# Patient Record
Sex: FEMALE | Race: WHITE | NOT HISPANIC OR LATINO | Employment: OTHER | URBAN - METROPOLITAN AREA
[De-identification: names, ages, dates, MRNs, and addresses within clinical notes are randomized per-mention and may not be internally consistent; named-entity substitution may affect disease eponyms.]

---

## 2017-02-22 ENCOUNTER — OFFICE VISIT (OUTPATIENT)
Dept: CARDIOLOGY | Facility: CLINIC | Age: 72
End: 2017-02-22

## 2017-02-22 VITALS
RESPIRATION RATE: 22 BRPM | HEIGHT: 66 IN | WEIGHT: 203 LBS | DIASTOLIC BLOOD PRESSURE: 92 MMHG | HEART RATE: 82 BPM | BODY MASS INDEX: 32.62 KG/M2 | SYSTOLIC BLOOD PRESSURE: 140 MMHG

## 2017-02-22 DIAGNOSIS — R55 SYNCOPE, UNSPECIFIED SYNCOPE TYPE: Primary | ICD-10-CM

## 2017-02-22 DIAGNOSIS — I48.0 PAF (PAROXYSMAL ATRIAL FIBRILLATION) (HCC): ICD-10-CM

## 2017-02-22 PROCEDURE — 99214 OFFICE O/P EST MOD 30 MIN: CPT | Performed by: INTERNAL MEDICINE

## 2017-02-22 PROCEDURE — 93000 ELECTROCARDIOGRAM COMPLETE: CPT | Performed by: INTERNAL MEDICINE

## 2017-02-24 ENCOUNTER — TELEPHONE (OUTPATIENT)
Dept: CARDIOLOGY | Facility: CLINIC | Age: 72
End: 2017-02-24

## 2017-02-24 NOTE — PROGRESS NOTES
Subjective:     Encounter Date:02/22/2017      Patient ID: Hui Workman is a 71 y.o. female.    Chief Complaint:  Atrial Fibrillation   Presents for follow-up visit. Symptoms include bradycardia, dizziness and shortness of breath. Symptoms are negative for chest pain, hypertension and palpitations. The symptoms have been worsening. Past medical history includes atrial fibrillation.     71-year-old female presents today for reevaluation.  Patient has a history of atrial fibrillation.  Recently however she's had syncopal episodes.  She has been getting dizzy spells also which was consistent with her increased heart rate.  She had episode however where she passed out.  She presents today for further evaluation.  Clinically denies chest discomforts lower extremity edema does report increasing fatigue.  Patient's memory is relatively poor but intermittent in nature.    Review of Systems   Cardiovascular: Negative for chest pain and palpitations.   Respiratory: Positive for shortness of breath.    Neurological: Positive for dizziness.   All other systems reviewed and are negative.        ECG 12 Lead  Date/Time: 2/22/2017 10:46 AM  Performed by: FEDE SIMON  Authorized by: FEDE SIMON   Comparison: compared with previous ECG from 12/6/2016  Similar to previous ECG  Rhythm: sinus rhythm  Clinical impression: normal ECG               Objective:     Physical Exam   Constitutional: She is oriented to person, place, and time. She appears well-developed.   HENT:   Head: Normocephalic.   Eyes: Conjunctivae are normal.   Neck: Normal range of motion.   Cardiovascular: Normal rate, regular rhythm and normal heart sounds.    Pulmonary/Chest: Breath sounds normal.   Abdominal: Soft. Bowel sounds are normal.   Musculoskeletal: Normal range of motion. She exhibits no edema.   Neurological: She is alert and oriented to person, place, and time.   Skin: Skin is warm and dry.   Psychiatric: She has a normal mood and  affect. Her behavior is normal.   Vitals reviewed.      Lab Review:       Assessment:          Diagnosis Plan   1. Syncope, unspecified syncope type  Holter Monitor - 24 Hour          Plan:       1.  Paroxysmal atrial fibrillation.  2.  Patient presents with syncopal episode.  In light of that I will place a Holter monitor on her to see what her heart rates are doing.  There was a delay in dictation and the Holter monitor socially came back.  She had multiple pauses up to 5.1 seconds.  Although no diary was submitted patient and patient's  reports she did have several episodes.  Along with the pauses she also had evidence of tachybradycardia syndrome with her heart rate going up into the 130 range.  She had atrial fibrillation and then subsequently having a AIVR with her heart rate dropping into the 30 range.  In light of that she is on a beta blocker but this is necessary because of a fast heart rate.  This is a clearcut indication for a DDD pacemaker and will set patient up for one.  Again this is symptomatic bradycardia as well as documented tachybradycardia syndrome on a minimal dose of a beta blocker.    Atrial Fibrillation and Atrial Flutter  Assessment  • The patient has paroxysmal atrial fibrillation  • This is non-valvular in etiology  • The patient's CHADS2-VASc score is 2  • A [id number] score of 2 or more is considered a high risk for a thromboembolic event  • Apixaban prescribed    Plan  • Attempt to maintain sinus rhythm  • Continue apixaban for antithrombotic therapy, bleeding issues discussed  • Continue beta blocker for rhythm control

## 2017-03-08 ENCOUNTER — TELEPHONE (OUTPATIENT)
Dept: CARDIOLOGY | Facility: CLINIC | Age: 72
End: 2017-03-08

## 2017-03-08 ENCOUNTER — TRANSCRIBE ORDERS (OUTPATIENT)
Dept: CARDIOLOGY | Facility: CLINIC | Age: 72
End: 2017-03-08

## 2017-03-08 ENCOUNTER — LAB (OUTPATIENT)
Dept: LAB | Facility: HOSPITAL | Age: 72
End: 2017-03-08
Attending: INTERNAL MEDICINE

## 2017-03-08 DIAGNOSIS — Z01.810 PRE-OPERATIVE CARDIOVASCULAR EXAMINATION: Primary | ICD-10-CM

## 2017-03-08 DIAGNOSIS — Z01.810 PRE-OPERATIVE CARDIOVASCULAR EXAMINATION: ICD-10-CM

## 2017-03-08 LAB
ANION GAP SERPL CALCULATED.3IONS-SCNC: 15.8 MMOL/L
BASOPHILS # BLD AUTO: 0.05 10*3/MM3 (ref 0–0.2)
BASOPHILS NFR BLD AUTO: 0.8 % (ref 0–1.5)
BUN BLD-MCNC: 22 MG/DL (ref 8–23)
BUN/CREAT SERPL: 17.9 (ref 7–25)
CALCIUM SPEC-SCNC: 10.2 MG/DL (ref 8.6–10.5)
CHLORIDE SERPL-SCNC: 96 MMOL/L (ref 98–107)
CO2 SERPL-SCNC: 24.2 MMOL/L (ref 22–29)
CREAT BLD-MCNC: 1.23 MG/DL (ref 0.57–1)
DEPRECATED RDW RBC AUTO: 48.5 FL (ref 37–54)
EOSINOPHIL # BLD AUTO: 0.08 10*3/MM3 (ref 0–0.7)
EOSINOPHIL NFR BLD AUTO: 1.2 % (ref 0.3–6.2)
ERYTHROCYTE [DISTWIDTH] IN BLOOD BY AUTOMATED COUNT: 14.1 % (ref 11.7–13)
GFR SERPL CREATININE-BSD FRML MDRD: 43 ML/MIN/1.73
GLUCOSE BLD-MCNC: 116 MG/DL (ref 65–99)
HCT VFR BLD AUTO: 42.8 % (ref 35.6–45.5)
HGB BLD-MCNC: 13.9 G/DL (ref 11.9–15.5)
IMM GRANULOCYTES # BLD: 0.06 10*3/MM3 (ref 0–0.03)
IMM GRANULOCYTES NFR BLD: 0.9 % (ref 0–0.5)
LYMPHOCYTES # BLD AUTO: 1.37 10*3/MM3 (ref 0.9–4.8)
LYMPHOCYTES NFR BLD AUTO: 20.7 % (ref 19.6–45.3)
MCH RBC QN AUTO: 30.6 PG (ref 26.9–32)
MCHC RBC AUTO-ENTMCNC: 32.5 G/DL (ref 32.4–36.3)
MCV RBC AUTO: 94.3 FL (ref 80.5–98.2)
MONOCYTES # BLD AUTO: 0.39 10*3/MM3 (ref 0.2–1.2)
MONOCYTES NFR BLD AUTO: 5.9 % (ref 5–12)
NEUTROPHILS # BLD AUTO: 4.67 10*3/MM3 (ref 1.9–8.1)
NEUTROPHILS NFR BLD AUTO: 70.5 % (ref 42.7–76)
PLATELET # BLD AUTO: 256 10*3/MM3 (ref 140–500)
PMV BLD AUTO: 9.8 FL (ref 6–12)
POTASSIUM BLD-SCNC: 4.5 MMOL/L (ref 3.5–5.2)
RBC # BLD AUTO: 4.54 10*6/MM3 (ref 3.9–5.2)
SODIUM BLD-SCNC: 136 MMOL/L (ref 136–145)
WBC NRBC COR # BLD: 6.62 10*3/MM3 (ref 4.5–10.7)

## 2017-03-08 PROCEDURE — 36415 COLL VENOUS BLD VENIPUNCTURE: CPT

## 2017-03-08 PROCEDURE — 85025 COMPLETE CBC W/AUTO DIFF WBC: CPT

## 2017-03-08 PROCEDURE — 80048 BASIC METABOLIC PNL TOTAL CA: CPT

## 2017-03-08 NOTE — TELEPHONE ENCOUNTER
Pt left msg saying she is having a pacemaker put in on 03-13-17 and has an appointment on 03-10-17.  She wants to know if it's really necessary to keep the appointment on 03-10-17?   Please advise.    Thanks,  Kellen

## 2017-03-10 NOTE — PERIOPERATIVE NURSING NOTE
CALLED AND SPOKE WITH PT, REMINDED NPO AFTER MN Sunday [place] AND MAY TAKE AM MEDS WITH A SIP OF WATER. LAST DOSE OF ELIQUIS TODAY 3/10.  TO ACCOMPANY PT TO HOSPITAL ON MONDAY

## 2017-03-13 ENCOUNTER — HOSPITAL ENCOUNTER (OUTPATIENT)
Facility: HOSPITAL | Age: 72
Setting detail: HOSPITAL OUTPATIENT SURGERY
Discharge: HOME | End: 2017-03-13
Attending: INTERNAL MEDICINE | Admitting: INTERNAL MEDICINE

## 2017-03-13 VITALS
DIASTOLIC BLOOD PRESSURE: 76 MMHG | RESPIRATION RATE: 16 BRPM | BODY MASS INDEX: 32.14 KG/M2 | WEIGHT: 200 LBS | OXYGEN SATURATION: 100 % | SYSTOLIC BLOOD PRESSURE: 136 MMHG | TEMPERATURE: 98.2 F | HEIGHT: 66 IN | HEART RATE: 73 BPM

## 2017-03-13 PROCEDURE — C1894 INTRO/SHEATH, NON-LASER: HCPCS | Performed by: INTERNAL MEDICINE

## 2017-03-13 PROCEDURE — 33208 INSRT HEART PM ATRIAL & VENT: CPT | Performed by: INTERNAL MEDICINE

## 2017-03-13 PROCEDURE — C1769 GUIDE WIRE: HCPCS | Performed by: INTERNAL MEDICINE

## 2017-03-13 PROCEDURE — C1898 LEAD, PMKR, OTHER THAN TRANS: HCPCS | Performed by: INTERNAL MEDICINE

## 2017-03-13 PROCEDURE — C1785 PMKR, DUAL, RATE-RESP: HCPCS | Performed by: INTERNAL MEDICINE

## 2017-03-13 PROCEDURE — 25010000003 CEFAZOLIN PER 500 MG: Performed by: INTERNAL MEDICINE

## 2017-03-13 PROCEDURE — 99153 MOD SED SAME PHYS/QHP EA: CPT | Performed by: INTERNAL MEDICINE

## 2017-03-13 PROCEDURE — 25010000002 MIDAZOLAM PER 1 MG: Performed by: INTERNAL MEDICINE

## 2017-03-13 PROCEDURE — 25010000002 FENTANYL CITRATE (PF) 100 MCG/2ML SOLUTION: Performed by: INTERNAL MEDICINE

## 2017-03-13 PROCEDURE — 99152 MOD SED SAME PHYS/QHP 5/>YRS: CPT | Performed by: INTERNAL MEDICINE

## 2017-03-13 DEVICE — IMPLANTABLE DEVICE: Type: IMPLANTABLE DEVICE | Status: FUNCTIONAL

## 2017-03-13 DEVICE — GEN PM ADVISA SURESCAN DR MRI: Type: IMPLANTABLE DEVICE | Status: FUNCTIONAL

## 2017-03-13 DEVICE — LD PM CAPSURE SENSE VENT 58CM 407458: Type: IMPLANTABLE DEVICE | Status: FUNCTIONAL

## 2017-03-13 RX ORDER — OXYCODONE HYDROCHLORIDE AND ACETAMINOPHEN 5; 325 MG/1; MG/1
1-2 TABLET ORAL EVERY 4 HOURS PRN
Qty: 15 TABLET | Refills: 0 | Status: SHIPPED | OUTPATIENT
Start: 2017-03-13 | End: 2017-04-14 | Stop reason: ALTCHOICE

## 2017-03-13 RX ORDER — FENTANYL CITRATE 50 UG/ML
INJECTION, SOLUTION INTRAMUSCULAR; INTRAVENOUS AS NEEDED
Status: DISCONTINUED | OUTPATIENT
Start: 2017-03-13 | End: 2017-03-13 | Stop reason: HOSPADM

## 2017-03-13 RX ORDER — SODIUM CHLORIDE 0.9 % (FLUSH) 0.9 %
1-10 SYRINGE (ML) INJECTION AS NEEDED
Status: DISCONTINUED | OUTPATIENT
Start: 2017-03-13 | End: 2017-03-13 | Stop reason: HOSPADM

## 2017-03-13 RX ORDER — LIDOCAINE HYDROCHLORIDE 10 MG/ML
0.1 INJECTION, SOLUTION EPIDURAL; INFILTRATION; INTRACAUDAL; PERINEURAL ONCE AS NEEDED
Status: DISCONTINUED | OUTPATIENT
Start: 2017-03-13 | End: 2017-03-13 | Stop reason: HOSPADM

## 2017-03-13 RX ORDER — MIDAZOLAM HYDROCHLORIDE 1 MG/ML
INJECTION INTRAMUSCULAR; INTRAVENOUS AS NEEDED
Status: DISCONTINUED | OUTPATIENT
Start: 2017-03-13 | End: 2017-03-13 | Stop reason: HOSPADM

## 2017-03-13 RX ORDER — LIDOCAINE HYDROCHLORIDE 10 MG/ML
INJECTION, SOLUTION INFILTRATION; PERINEURAL AS NEEDED
Status: DISCONTINUED | OUTPATIENT
Start: 2017-03-13 | End: 2017-03-13 | Stop reason: HOSPADM

## 2017-03-13 RX ORDER — SODIUM CHLORIDE 9 MG/ML
75 INJECTION, SOLUTION INTRAVENOUS CONTINUOUS
Status: DISCONTINUED | OUTPATIENT
Start: 2017-03-13 | End: 2017-03-13 | Stop reason: HOSPADM

## 2017-03-13 RX ADMIN — CEFAZOLIN SODIUM 1 G: 1 INJECTION, SOLUTION INTRAVENOUS at 08:00

## 2017-03-13 RX ADMIN — SODIUM CHLORIDE 75 ML/HR: 9 INJECTION, SOLUTION INTRAVENOUS at 07:25

## 2017-03-13 NOTE — PLAN OF CARE
Problem: Patient Care Overview (Adult)  Goal: Adult Individualization and Mutuality  Outcome: Outcome(s) achieved Date Met:  03/13/17

## 2017-03-13 NOTE — PLAN OF CARE
Problem: Patient Care Overview (Adult)  Goal: Plan of Care Review  Outcome: Outcome(s) achieved Date Met:  03/13/17 03/13/17 1000   Coping/Psychosocial Response Interventions   Plan Of Care Reviewed With patient;isabel   Patient Care Overview   Progress progress toward functional goals as expected   Outcome Evaluation   Outcome Summary/Follow up Plan ready for discharge

## 2017-03-13 NOTE — H&P (VIEW-ONLY)
Subjective:     Encounter Date:02/22/2017      Patient ID: Hui Workman is a 71 y.o. female.    Chief Complaint:  Atrial Fibrillation   Presents for follow-up visit. Symptoms include bradycardia, dizziness and shortness of breath. Symptoms are negative for chest pain, hypertension and palpitations. The symptoms have been worsening. Past medical history includes atrial fibrillation.     71-year-old female presents today for reevaluation.  Patient has a history of atrial fibrillation.  Recently however she's had syncopal episodes.  She has been getting dizzy spells also which was consistent with her increased heart rate.  She had episode however where she passed out.  She presents today for further evaluation.  Clinically denies chest discomforts lower extremity edema does report increasing fatigue.  Patient's memory is relatively poor but intermittent in nature.    Review of Systems   Cardiovascular: Negative for chest pain and palpitations.   Respiratory: Positive for shortness of breath.    Neurological: Positive for dizziness.   All other systems reviewed and are negative.        ECG 12 Lead  Date/Time: 2/22/2017 10:46 AM  Performed by: FEDE SIMON  Authorized by: FEDE SIMON   Comparison: compared with previous ECG from 12/6/2016  Similar to previous ECG  Rhythm: sinus rhythm  Clinical impression: normal ECG               Objective:     Physical Exam   Constitutional: She is oriented to person, place, and time. She appears well-developed.   HENT:   Head: Normocephalic.   Eyes: Conjunctivae are normal.   Neck: Normal range of motion.   Cardiovascular: Normal rate, regular rhythm and normal heart sounds.    Pulmonary/Chest: Breath sounds normal.   Abdominal: Soft. Bowel sounds are normal.   Musculoskeletal: Normal range of motion. She exhibits no edema.   Neurological: She is alert and oriented to person, place, and time.   Skin: Skin is warm and dry.   Psychiatric: She has a normal mood and  affect. Her behavior is normal.   Vitals reviewed.      Lab Review:       Assessment:          Diagnosis Plan   1. Syncope, unspecified syncope type  Holter Monitor - 24 Hour          Plan:       1.  Paroxysmal atrial fibrillation.  2.  Patient presents with syncopal episode.  In light of that I will place a Holter monitor on her to see what her heart rates are doing.  There was a delay in dictation and the Holter monitor socially came back.  She had multiple pauses up to 5.1 seconds.  Although no diary was submitted patient and patient's  reports she did have several episodes.  Along with the pauses she also had evidence of tachybradycardia syndrome with her heart rate going up into the 130 range.  She had atrial fibrillation and then subsequently having a AIVR with her heart rate dropping into the 30 range.  In light of that she is on a beta blocker but this is necessary because of a fast heart rate.  This is a clearcut indication for a DDD pacemaker and will set patient up for one.  Again this is symptomatic bradycardia as well as documented tachybradycardia syndrome on a minimal dose of a beta blocker.    Atrial Fibrillation and Atrial Flutter  Assessment  • The patient has paroxysmal atrial fibrillation  • This is non-valvular in etiology  • The patient's CHADS2-VASc score is 2  • A [id number] score of 2 or more is considered a high risk for a thromboembolic event  • Apixaban prescribed    Plan  • Attempt to maintain sinus rhythm  • Continue apixaban for antithrombotic therapy, bleeding issues discussed  • Continue beta blocker for rhythm control

## 2017-03-13 NOTE — PLAN OF CARE
Problem: Patient Care Overview (Adult)  Goal: Discharge Needs Assessment  Outcome: Outcome(s) achieved Date Met:  03/13/17

## 2017-03-13 NOTE — PLAN OF CARE
Problem: Cardiac Rhythm Management Device (Adult)  Goal: Signs and Symptoms of Listed Potential Problems Will be Absent or Manageable (Cardiac Rhythm Management Device)  Outcome: Outcome(s) achieved Date Met:  03/13/17 03/13/17 1000   Cardiac Rhythm Management Device   Problems Assessed (Cardiac Rhythm Management Device) all   Problems Present (Cardiac Rhythm Management Device) none

## 2017-03-20 ENCOUNTER — CLINICAL SUPPORT NO REQUIREMENTS (OUTPATIENT)
Dept: CARDIOLOGY | Facility: CLINIC | Age: 72
End: 2017-03-20

## 2017-03-23 ENCOUNTER — CLINICAL SUPPORT NO REQUIREMENTS (OUTPATIENT)
Dept: CARDIOLOGY | Facility: CLINIC | Age: 72
End: 2017-03-23

## 2017-03-23 DIAGNOSIS — I49.5 SICK SINUS SYNDROME (HCC): Primary | ICD-10-CM

## 2017-04-10 RX ORDER — APIXABAN 5 MG/1
TABLET, FILM COATED ORAL
Qty: 180 TABLET | Refills: 1 | Status: SHIPPED | OUTPATIENT
Start: 2017-04-10 | End: 2017-05-26 | Stop reason: SDUPTHER

## 2017-04-14 ENCOUNTER — OFFICE VISIT (OUTPATIENT)
Dept: CARDIOLOGY | Facility: CLINIC | Age: 72
End: 2017-04-14

## 2017-04-14 VITALS
SYSTOLIC BLOOD PRESSURE: 140 MMHG | HEART RATE: 60 BPM | HEIGHT: 66 IN | BODY MASS INDEX: 32.78 KG/M2 | DIASTOLIC BLOOD PRESSURE: 80 MMHG | WEIGHT: 204 LBS

## 2017-04-14 DIAGNOSIS — I49.5 SSS (SICK SINUS SYNDROME) (HCC): ICD-10-CM

## 2017-04-14 DIAGNOSIS — I48.0 PAROXYSMAL ATRIAL FIBRILLATION (HCC): Primary | ICD-10-CM

## 2017-04-14 PROCEDURE — 99214 OFFICE O/P EST MOD 30 MIN: CPT | Performed by: INTERNAL MEDICINE

## 2017-04-14 NOTE — PROGRESS NOTES
Subjective:     Encounter Date:04/14/2017      Patient ID: Hui Workman is a 71 y.o. female.    Chief Complaint:  Atrial Fibrillation   Presents for follow-up visit. Symptoms are negative for an AICD problem, bradycardia, chest pain, dizziness, hypertension, pacemaker problem, shortness of breath and syncope. The symptoms have been stable. Past medical history includes atrial fibrillation.   Hypertension   This is a new problem. The current episode started more than 1 year ago. The problem is controlled. Pertinent negatives include no chest pain or shortness of breath.     Patient presents today for reevaluation.  Patient had a pacemaker placed.  Patient continues to do okay.  No chest pain no syncope no shortness of breath.  Occasional fatigue.    Review of Systems   Cardiovascular: Negative for chest pain and syncope.   Respiratory: Positive for wheezing. Negative for shortness of breath.    Neurological: Negative for dizziness.   All other systems reviewed and are negative.      Procedures       Objective:     Physical Exam   Constitutional: She is oriented to person, place, and time. She appears well-developed.   HENT:   Head: Normocephalic.   Eyes: Conjunctivae are normal.   Neck: Normal range of motion.   Cardiovascular: Normal rate, regular rhythm and normal heart sounds.    Pulmonary/Chest: Breath sounds normal.   Abdominal: Soft. Bowel sounds are normal.   Musculoskeletal: Normal range of motion. She exhibits no edema.   Neurological: She is alert and oriented to person, place, and time.   Skin: Skin is warm and dry.   Psychiatric: She has a normal mood and affect. Her behavior is normal.   Vitals reviewed.      Lab Review:       Assessment:         No diagnosis found.       Plan:       1.  Paroxysmal atrial fibrillation.  Pacemaker interrogation shows that she is in atrial fibrillation approximately 30% of the time.  She has a rapid rate.  Discussed options.  Can increase her beta blocker to 25 mg  twice a day.  Other options include amiodarone which I did discuss however from a fall off for the time being.  Patient is concerned because she's had lung cancer before with treatment and always a concern of scarring.  2.  Syncope none further  3.  Hypertension patient's blood pressure is elevated again increasing her beta blocker will help.    4. Follow-up in 6 weeks we'll reassess at that point.    Atrial Fibrillation and Atrial Flutter  Assessment  • The patient has paroxysmal atrial fibrillation  • This is non-valvular in etiology  • The patient's CHADS2-VASc score is 2  • A [id number] score of 2 or more is considered a high risk for a thromboembolic event  • Apixaban prescribed    Plan  • Attempt to maintain sinus rhythm  • Continue apixaban for antithrombotic therapy, bleeding issues discussed  • Continue beta blocker for rhythm control

## 2017-05-25 ENCOUNTER — TELEPHONE (OUTPATIENT)
Dept: OTHER | Facility: HOSPITAL | Age: 72
End: 2017-05-25

## 2017-05-25 ENCOUNTER — HOSPITAL ENCOUNTER (OUTPATIENT)
Dept: PET IMAGING | Facility: HOSPITAL | Age: 72
Discharge: HOME | End: 2017-05-25
Attending: INTERNAL MEDICINE | Admitting: INTERNAL MEDICINE

## 2017-05-25 DIAGNOSIS — Z87.891 FORMER HEAVY CIGARETTE SMOKER (20-39 PER DAY): ICD-10-CM

## 2017-05-25 DIAGNOSIS — Z12.2 SCREENING FOR MALIGNANT NEOPLASM OF RESPIRATORY ORGAN: Primary | ICD-10-CM

## 2017-05-25 DIAGNOSIS — T45.1X5A CHEMOTHERAPY-INDUCED NEUROPATHY (HCC): ICD-10-CM

## 2017-05-25 DIAGNOSIS — M54.2 NECK PAIN ON RIGHT SIDE: ICD-10-CM

## 2017-05-25 DIAGNOSIS — Z85.3 HISTORY OF LEFT BREAST CANCER: ICD-10-CM

## 2017-05-25 DIAGNOSIS — E04.1 THYROID NODULE: ICD-10-CM

## 2017-05-25 DIAGNOSIS — G62.0 CHEMOTHERAPY-INDUCED NEUROPATHY (HCC): ICD-10-CM

## 2017-05-25 DIAGNOSIS — C34.90 SMALL CELL LUNG CANCER, UNSPECIFIED LATERALITY (HCC): ICD-10-CM

## 2017-05-25 PROCEDURE — G0297 LDCT FOR LUNG CA SCREEN: HCPCS

## 2017-05-26 ENCOUNTER — OFFICE VISIT (OUTPATIENT)
Dept: CARDIOLOGY | Facility: CLINIC | Age: 72
End: 2017-05-26

## 2017-05-26 ENCOUNTER — CLINICAL SUPPORT NO REQUIREMENTS (OUTPATIENT)
Dept: CARDIOLOGY | Facility: CLINIC | Age: 72
End: 2017-05-26

## 2017-05-26 VITALS
SYSTOLIC BLOOD PRESSURE: 100 MMHG | DIASTOLIC BLOOD PRESSURE: 72 MMHG | HEART RATE: 61 BPM | WEIGHT: 206 LBS | BODY MASS INDEX: 33.11 KG/M2 | HEIGHT: 66 IN

## 2017-05-26 DIAGNOSIS — I48.0 PAROXYSMAL ATRIAL FIBRILLATION (HCC): ICD-10-CM

## 2017-05-26 DIAGNOSIS — I49.5 SSS (SICK SINUS SYNDROME) (HCC): Primary | ICD-10-CM

## 2017-05-26 DIAGNOSIS — I35.1 NONRHEUMATIC AORTIC VALVE INSUFFICIENCY: Primary | ICD-10-CM

## 2017-05-26 PROCEDURE — 93280 PM DEVICE PROGR EVAL DUAL: CPT | Performed by: INTERNAL MEDICINE

## 2017-05-26 PROCEDURE — 99213 OFFICE O/P EST LOW 20 MIN: CPT | Performed by: INTERNAL MEDICINE

## 2017-05-31 ENCOUNTER — OFFICE VISIT (OUTPATIENT)
Dept: ONCOLOGY | Facility: CLINIC | Age: 72
End: 2017-05-31

## 2017-05-31 ENCOUNTER — LAB (OUTPATIENT)
Dept: LAB | Facility: HOSPITAL | Age: 72
End: 2017-05-31

## 2017-05-31 VITALS
WEIGHT: 205 LBS | DIASTOLIC BLOOD PRESSURE: 76 MMHG | SYSTOLIC BLOOD PRESSURE: 118 MMHG | HEIGHT: 65 IN | TEMPERATURE: 98 F | RESPIRATION RATE: 12 BRPM | HEART RATE: 100 BPM | OXYGEN SATURATION: 98 % | BODY MASS INDEX: 34.16 KG/M2

## 2017-05-31 DIAGNOSIS — E04.1 THYROID NODULE: ICD-10-CM

## 2017-05-31 DIAGNOSIS — G62.0 CHEMOTHERAPY-INDUCED NEUROPATHY (HCC): ICD-10-CM

## 2017-05-31 DIAGNOSIS — C34.91 SMALL CELL CARCINOMA OF RIGHT LUNG (HCC): ICD-10-CM

## 2017-05-31 DIAGNOSIS — T45.1X5A CHEMOTHERAPY-INDUCED NEUROPATHY (HCC): ICD-10-CM

## 2017-05-31 DIAGNOSIS — R53.82 CHRONIC FATIGUE: ICD-10-CM

## 2017-05-31 DIAGNOSIS — Z12.31 ENCOUNTER FOR SCREENING MAMMOGRAM FOR MALIGNANT NEOPLASM OF BREAST: ICD-10-CM

## 2017-05-31 DIAGNOSIS — Z85.3 HISTORY OF LEFT BREAST CANCER: Primary | ICD-10-CM

## 2017-05-31 DIAGNOSIS — Z87.891 PERSONAL HISTORY OF NICOTINE DEPENDENCE: ICD-10-CM

## 2017-05-31 DIAGNOSIS — Z85.3 HISTORY OF LEFT BREAST CANCER: ICD-10-CM

## 2017-05-31 DIAGNOSIS — M54.2 NECK PAIN ON RIGHT SIDE: ICD-10-CM

## 2017-05-31 DIAGNOSIS — C34.91 SMALL CELL CARCINOMA OF RIGHT LUNG (HCC): Primary | ICD-10-CM

## 2017-05-31 LAB
ALBUMIN SERPL-MCNC: 4.2 G/DL (ref 3.5–5.2)
ALBUMIN/GLOB SERPL: 1.3 G/DL (ref 1.1–2.4)
ALP SERPL-CCNC: 71 U/L (ref 38–116)
ALT SERPL W P-5'-P-CCNC: 19 U/L (ref 0–33)
ANION GAP SERPL CALCULATED.3IONS-SCNC: 17.3 MMOL/L
AST SERPL-CCNC: 16 U/L (ref 0–32)
BASOPHILS # BLD AUTO: 0.05 10*3/MM3 (ref 0–0.1)
BASOPHILS NFR BLD AUTO: 0.6 % (ref 0–1.1)
BILIRUB SERPL-MCNC: 0.6 MG/DL (ref 0.1–1.2)
BUN BLD-MCNC: 21 MG/DL (ref 6–20)
BUN/CREAT SERPL: 17.5 (ref 7.3–30)
CALCIUM SPEC-SCNC: 9.6 MG/DL (ref 8.5–10.2)
CHLORIDE SERPL-SCNC: 96 MMOL/L (ref 98–107)
CO2 SERPL-SCNC: 22.7 MMOL/L (ref 22–29)
CREAT BLD-MCNC: 1.2 MG/DL (ref 0.6–1.1)
DEPRECATED RDW RBC AUTO: 47.8 FL (ref 37–49)
EOSINOPHIL # BLD AUTO: 0.14 10*3/MM3 (ref 0–0.36)
EOSINOPHIL NFR BLD AUTO: 1.7 % (ref 1–5)
ERYTHROCYTE [DISTWIDTH] IN BLOOD BY AUTOMATED COUNT: 13.6 % (ref 11.7–14.5)
GFR SERPL CREATININE-BSD FRML MDRD: 44 ML/MIN/1.73
GLOBULIN UR ELPH-MCNC: 3.3 GM/DL (ref 1.8–3.5)
GLUCOSE BLD-MCNC: 179 MG/DL (ref 74–124)
HCT VFR BLD AUTO: 43.3 % (ref 34–45)
HGB BLD-MCNC: 13.7 G/DL (ref 11.5–14.9)
HOLD SPECIMEN: NORMAL
IMM GRANULOCYTES # BLD: 0.05 10*3/MM3 (ref 0–0.03)
IMM GRANULOCYTES NFR BLD: 0.6 % (ref 0–0.5)
LYMPHOCYTES # BLD AUTO: 1.38 10*3/MM3 (ref 1–3.5)
LYMPHOCYTES NFR BLD AUTO: 16.9 % (ref 20–49)
MCH RBC QN AUTO: 30.3 PG (ref 27–33)
MCHC RBC AUTO-ENTMCNC: 31.6 G/DL (ref 32–35)
MCV RBC AUTO: 95.8 FL (ref 83–97)
MONOCYTES # BLD AUTO: 0.59 10*3/MM3 (ref 0.25–0.8)
MONOCYTES NFR BLD AUTO: 7.2 % (ref 4–12)
NEUTROPHILS # BLD AUTO: 5.96 10*3/MM3 (ref 1.5–7)
NEUTROPHILS NFR BLD AUTO: 73 % (ref 39–75)
NRBC BLD MANUAL-RTO: 0 /100 WBC (ref 0–0)
PLATELET # BLD AUTO: 296 10*3/MM3 (ref 150–375)
PMV BLD AUTO: 9.3 FL (ref 8.9–12.1)
POTASSIUM BLD-SCNC: 4.4 MMOL/L (ref 3.5–4.7)
PROT SERPL-MCNC: 7.5 G/DL (ref 6.3–8)
RBC # BLD AUTO: 4.52 10*6/MM3 (ref 3.9–5)
SODIUM BLD-SCNC: 136 MMOL/L (ref 134–145)
T4 FREE SERPL-MCNC: 1.27 NG/DL (ref 0.93–1.7)
TSH SERPL DL<=0.05 MIU/L-ACNC: 1.75 MIU/ML (ref 0.27–4.2)
WBC NRBC COR # BLD: 8.17 10*3/MM3 (ref 4–10)

## 2017-05-31 PROCEDURE — 99213 OFFICE O/P EST LOW 20 MIN: CPT | Performed by: INTERNAL MEDICINE

## 2017-05-31 PROCEDURE — 84439 ASSAY OF FREE THYROXINE: CPT | Performed by: INTERNAL MEDICINE

## 2017-05-31 PROCEDURE — 80053 COMPREHEN METABOLIC PANEL: CPT

## 2017-05-31 PROCEDURE — 36415 COLL VENOUS BLD VENIPUNCTURE: CPT

## 2017-05-31 PROCEDURE — 85025 COMPLETE CBC W/AUTO DIFF WBC: CPT

## 2017-05-31 PROCEDURE — 84443 ASSAY THYROID STIM HORMONE: CPT | Performed by: INTERNAL MEDICINE

## 2017-06-26 ENCOUNTER — TELEPHONE (OUTPATIENT)
Dept: CARDIOLOGY | Facility: CLINIC | Age: 72
End: 2017-06-26

## 2017-06-26 NOTE — TELEPHONE ENCOUNTER
Pt is calling, she has had a couple of nosebleeds & wanted BH to know. They are not bad, it doesn't take long to get them to stop, her PCP recommends she go to ENT, she just wanted to see if you think it's her medication. Pt # [phone] or [phone]. anyk

## 2017-08-31 ENCOUNTER — CLINICAL SUPPORT NO REQUIREMENTS (OUTPATIENT)
Dept: CARDIOLOGY | Facility: CLINIC | Age: 72
End: 2017-08-31

## 2017-08-31 DIAGNOSIS — I48.0 PAROXYSMAL ATRIAL FIBRILLATION (HCC): Primary | ICD-10-CM

## 2017-08-31 PROCEDURE — 93296 REM INTERROG EVL PM/IDS: CPT | Performed by: INTERNAL MEDICINE

## 2017-08-31 PROCEDURE — 93294 REM INTERROG EVL PM/LDLS PM: CPT | Performed by: INTERNAL MEDICINE

## 2017-10-11 RX ORDER — APIXABAN 5 MG/1
TABLET, FILM COATED ORAL
Qty: 180 TABLET | Refills: 0 | Status: SHIPPED | OUTPATIENT
Start: 2017-10-11 | End: 2017-12-01 | Stop reason: SDUPTHER

## 2017-12-01 ENCOUNTER — CLINICAL SUPPORT NO REQUIREMENTS (OUTPATIENT)
Dept: CARDIOLOGY | Facility: CLINIC | Age: 72
End: 2017-12-01

## 2017-12-01 ENCOUNTER — OFFICE VISIT (OUTPATIENT)
Dept: CARDIOLOGY | Facility: CLINIC | Age: 72
End: 2017-12-01

## 2017-12-01 VITALS
SYSTOLIC BLOOD PRESSURE: 104 MMHG | HEART RATE: 54 BPM | DIASTOLIC BLOOD PRESSURE: 66 MMHG | BODY MASS INDEX: 34.82 KG/M2 | WEIGHT: 209 LBS | HEIGHT: 65 IN

## 2017-12-01 DIAGNOSIS — I48.0 PAROXYSMAL ATRIAL FIBRILLATION (HCC): Primary | ICD-10-CM

## 2017-12-01 DIAGNOSIS — I48.0 PAROXYSMAL ATRIAL FIBRILLATION (HCC): ICD-10-CM

## 2017-12-01 DIAGNOSIS — I35.1 NONRHEUMATIC AORTIC VALVE INSUFFICIENCY: Primary | ICD-10-CM

## 2017-12-01 PROCEDURE — 99214 OFFICE O/P EST MOD 30 MIN: CPT | Performed by: INTERNAL MEDICINE

## 2017-12-01 PROCEDURE — 93280 PM DEVICE PROGR EVAL DUAL: CPT | Performed by: INTERNAL MEDICINE

## 2017-12-06 NOTE — PROGRESS NOTES
Subjective:     Encounter Date:12/01/2017      Patient ID: Hui Workman is a 72 y.o. female.    Chief Complaint:  Atrial Fibrillation   Presents for follow-up visit. Symptoms are negative for an AICD problem, bradycardia, chest pain, dizziness, hypertension, shortness of breath and syncope. The symptoms have been stable. Past medical history includes atrial fibrillation.       72-year-old female who presents today for reevaluation.  All in all she is actually doing relatively well.  She's had a little bit lightheadedness today some shortness of breath and edema which has not changed since that seen her last.      Review of Systems   Cardiovascular: Negative for chest pain and syncope.   Respiratory: Negative for shortness of breath.    Neurological: Negative for dizziness.   All other systems reviewed and are negative.      Procedures       Objective:     Physical Exam   Constitutional: She is oriented to person, place, and time. She appears well-developed.   HENT:   Head: Normocephalic.   Eyes: Conjunctivae are normal.   Neck: Normal range of motion.   Cardiovascular: Normal rate, regular rhythm and normal heart sounds.    Pulmonary/Chest: Breath sounds normal.   Abdominal: Soft. Bowel sounds are normal.   Musculoskeletal: Normal range of motion. She exhibits no edema.   Neurological: She is alert and oriented to person, place, and time.   Skin: Skin is warm and dry.   Psychiatric: She has a normal mood and affect. Her behavior is normal.   Vitals reviewed.      Lab Review:       Assessment:         No diagnosis found.       Plan:         1.  Paroxysmal atrial fibrillation with tachybradycardia syndrome.  Patient's pacemaker was interrogated today and she was found to be in atrial fibrillation.  She has had about 27% at time A. fib burden.  All in all she is actually doing relatively well she is anticoagulated and on a beta blocker.  Blood pressure is good today.  Light of that I would continue the same have  patient follow-up in 6-12 weeks.  2.  Hypertension blood pressures good  3.  History of aortic insufficiency clinically stable.  4.  History of small cell lung cancer.  Followed by Dr. Almanzar.  5.  Follow-up 6-12 months sooner if issues      Atrial Fibrillation and Atrial Flutter  Assessment  • The patient has paroxysmal atrial fibrillation  • This is non-valvular in etiology  • The patient's CHADS2-VASc score is 2  • A [id number] score of 2 or more is considered a high risk for a thromboembolic event  • Apixaban prescribed    Plan  • Attempt to maintain sinus rhythm  • Continue apixaban for antithrombotic therapy, bleeding issues discussed  • Continue beta blocker for rhythm control

## 2017-12-19 ENCOUNTER — HOSPITAL ENCOUNTER (OUTPATIENT)
Dept: ULTRASOUND IMAGING | Facility: HOSPITAL | Age: 72
Discharge: HOME | End: 2017-12-19
Attending: INTERNAL MEDICINE | Admitting: INTERNAL MEDICINE

## 2017-12-19 DIAGNOSIS — T45.1X5A CHEMOTHERAPY-INDUCED NEUROPATHY (HCC): ICD-10-CM

## 2017-12-19 DIAGNOSIS — C34.91 SMALL CELL CARCINOMA OF RIGHT LUNG (HCC): ICD-10-CM

## 2017-12-19 DIAGNOSIS — Z85.3 HISTORY OF LEFT BREAST CANCER: ICD-10-CM

## 2017-12-19 DIAGNOSIS — G62.0 CHEMOTHERAPY-INDUCED NEUROPATHY (HCC): ICD-10-CM

## 2017-12-19 PROCEDURE — 76536 US EXAM OF HEAD AND NECK: CPT

## 2018-01-03 ENCOUNTER — TELEPHONE (OUTPATIENT)
Dept: CARDIOLOGY | Facility: CLINIC | Age: 73
End: 2018-01-03

## 2018-01-03 RX ORDER — APIXABAN 5 MG/1
TABLET, FILM COATED ORAL
Qty: 180 TABLET | Refills: 3 | Status: SHIPPED | OUTPATIENT
Start: 2018-01-03 | End: 2018-06-13

## 2018-01-03 NOTE — TELEPHONE ENCOUNTER
Pt wants to know if she should continue to take tylenol because she has been having a lot of nosebleeds?  She is also on Eliquis.   She has been having them for approximately 3-4 wks and it takes her a while to get them to stop.  She can even feel the blood draining down her throat.  Please advise.    Kellen Metcalf leaves tomorrow for [place] so can be reached on her cell # [phone]

## 2018-06-05 ENCOUNTER — CLINICAL SUPPORT NO REQUIREMENTS (OUTPATIENT)
Dept: CARDIOLOGY | Facility: CLINIC | Age: 73
End: 2018-06-05

## 2018-06-05 DIAGNOSIS — I49.5 SICK SINUS SYNDROME (HCC): Primary | ICD-10-CM

## 2018-06-05 PROCEDURE — 93280 PM DEVICE PROGR EVAL DUAL: CPT | Performed by: INTERNAL MEDICINE

## 2018-06-06 ENCOUNTER — HOSPITAL ENCOUNTER (OUTPATIENT)
Dept: PET IMAGING | Facility: HOSPITAL | Age: 73
Discharge: HOME | End: 2018-06-06
Admitting: INTERNAL MEDICINE

## 2018-06-06 DIAGNOSIS — Z85.3 HISTORY OF LEFT BREAST CANCER: ICD-10-CM

## 2018-06-06 DIAGNOSIS — C34.91 SMALL CELL CARCINOMA OF RIGHT LUNG (HCC): ICD-10-CM

## 2018-06-06 DIAGNOSIS — T45.1X5A CHEMOTHERAPY-INDUCED NEUROPATHY (HCC): ICD-10-CM

## 2018-06-06 DIAGNOSIS — Z87.891 PERSONAL HISTORY OF NICOTINE DEPENDENCE: ICD-10-CM

## 2018-06-06 DIAGNOSIS — G62.0 CHEMOTHERAPY-INDUCED NEUROPATHY (HCC): ICD-10-CM

## 2018-06-06 PROCEDURE — G0297 LDCT FOR LUNG CA SCREEN: HCPCS

## 2018-06-13 ENCOUNTER — LAB (OUTPATIENT)
Dept: LAB | Facility: HOSPITAL | Age: 73
End: 2018-06-13

## 2018-06-13 ENCOUNTER — OFFICE VISIT (OUTPATIENT)
Dept: ONCOLOGY | Facility: CLINIC | Age: 73
End: 2018-06-13

## 2018-06-13 VITALS
DIASTOLIC BLOOD PRESSURE: 80 MMHG | HEIGHT: 66 IN | BODY MASS INDEX: 33.49 KG/M2 | RESPIRATION RATE: 14 BRPM | TEMPERATURE: 98.8 F | WEIGHT: 208.4 LBS | OXYGEN SATURATION: 95 % | HEART RATE: 75 BPM | SYSTOLIC BLOOD PRESSURE: 140 MMHG

## 2018-06-13 DIAGNOSIS — T45.1X5A CHEMOTHERAPY-INDUCED NEUROPATHY (HCC): ICD-10-CM

## 2018-06-13 DIAGNOSIS — Z85.118 PERSONAL HISTORY OF LUNG CANCER: ICD-10-CM

## 2018-06-13 DIAGNOSIS — R53.82 CHRONIC FATIGUE: ICD-10-CM

## 2018-06-13 DIAGNOSIS — C34.91 SMALL CELL CARCINOMA OF RIGHT LUNG (HCC): ICD-10-CM

## 2018-06-13 DIAGNOSIS — R91.8 OTHER NONSPECIFIC ABNORMAL FINDING OF LUNG FIELD (CODE): ICD-10-CM

## 2018-06-13 DIAGNOSIS — G62.0 CHEMOTHERAPY-INDUCED NEUROPATHY (HCC): ICD-10-CM

## 2018-06-13 DIAGNOSIS — Z85.3 HISTORY OF LEFT BREAST CANCER: ICD-10-CM

## 2018-06-13 DIAGNOSIS — C34.91 SMALL CELL CARCINOMA OF RIGHT LUNG (HCC): Primary | ICD-10-CM

## 2018-06-13 DIAGNOSIS — E04.1 THYROID NODULE: ICD-10-CM

## 2018-06-13 LAB
ALBUMIN SERPL-MCNC: 4.3 G/DL (ref 3.5–5.2)
ALBUMIN/GLOB SERPL: 1.4 G/DL (ref 1.1–2.4)
ALP SERPL-CCNC: 76 U/L (ref 38–116)
ALT SERPL W P-5'-P-CCNC: 23 U/L (ref 0–33)
ANION GAP SERPL CALCULATED.3IONS-SCNC: 13.9 MMOL/L
AST SERPL-CCNC: 17 U/L (ref 0–32)
BASOPHILS # BLD AUTO: 0.06 10*3/MM3 (ref 0–0.1)
BASOPHILS NFR BLD AUTO: 0.9 % (ref 0–1.1)
BILIRUB SERPL-MCNC: 0.6 MG/DL (ref 0.1–1.2)
BUN BLD-MCNC: 24 MG/DL (ref 6–20)
BUN/CREAT SERPL: 16.6 (ref 7.3–30)
CALCIUM SPEC-SCNC: 9.7 MG/DL (ref 8.5–10.2)
CHLORIDE SERPL-SCNC: 99 MMOL/L (ref 98–107)
CO2 SERPL-SCNC: 24.1 MMOL/L (ref 22–29)
CREAT BLD-MCNC: 1.45 MG/DL (ref 0.6–1.1)
DEPRECATED RDW RBC AUTO: 48.9 FL (ref 37–49)
EOSINOPHIL # BLD AUTO: 0.19 10*3/MM3 (ref 0–0.36)
EOSINOPHIL NFR BLD AUTO: 2.8 % (ref 1–5)
ERYTHROCYTE [DISTWIDTH] IN BLOOD BY AUTOMATED COUNT: 14.1 % (ref 11.7–14.5)
GFR SERPL CREATININE-BSD FRML MDRD: 35 ML/MIN/1.73
GLOBULIN UR ELPH-MCNC: 3.1 GM/DL (ref 1.8–3.5)
GLUCOSE BLD-MCNC: 135 MG/DL (ref 74–124)
HCT VFR BLD AUTO: 40.6 % (ref 34–45)
HGB BLD-MCNC: 12.9 G/DL (ref 11.5–14.9)
IMM GRANULOCYTES # BLD: 0.04 10*3/MM3 (ref 0–0.03)
IMM GRANULOCYTES NFR BLD: 0.6 % (ref 0–0.5)
LYMPHOCYTES # BLD AUTO: 1.46 10*3/MM3 (ref 1–3.5)
LYMPHOCYTES NFR BLD AUTO: 21.7 % (ref 20–49)
MCH RBC QN AUTO: 29.7 PG (ref 27–33)
MCHC RBC AUTO-ENTMCNC: 31.8 G/DL (ref 32–35)
MCV RBC AUTO: 93.5 FL (ref 83–97)
MONOCYTES # BLD AUTO: 0.5 10*3/MM3 (ref 0.25–0.8)
MONOCYTES NFR BLD AUTO: 7.4 % (ref 4–12)
NEUTROPHILS # BLD AUTO: 4.49 10*3/MM3 (ref 1.5–7)
NEUTROPHILS NFR BLD AUTO: 66.6 % (ref 39–75)
NRBC BLD MANUAL-RTO: 0 /100 WBC (ref 0–0)
PLATELET # BLD AUTO: 258 10*3/MM3 (ref 150–375)
PMV BLD AUTO: 9.5 FL (ref 8.9–12.1)
POTASSIUM BLD-SCNC: 4.7 MMOL/L (ref 3.5–4.7)
PROT SERPL-MCNC: 7.4 G/DL (ref 6.3–8)
RBC # BLD AUTO: 4.34 10*6/MM3 (ref 3.9–5)
SODIUM BLD-SCNC: 137 MMOL/L (ref 134–145)
WBC NRBC COR # BLD: 6.74 10*3/MM3 (ref 4–10)

## 2018-06-13 PROCEDURE — 99215 OFFICE O/P EST HI 40 MIN: CPT | Performed by: INTERNAL MEDICINE

## 2018-06-13 PROCEDURE — 36415 COLL VENOUS BLD VENIPUNCTURE: CPT | Performed by: INTERNAL MEDICINE

## 2018-06-13 PROCEDURE — 80053 COMPREHEN METABOLIC PANEL: CPT | Performed by: INTERNAL MEDICINE

## 2018-06-13 PROCEDURE — 83615 LACTATE (LD) (LDH) ENZYME: CPT | Performed by: INTERNAL MEDICINE

## 2018-06-13 PROCEDURE — 85025 COMPLETE CBC W/AUTO DIFF WBC: CPT | Performed by: INTERNAL MEDICINE

## 2018-06-13 RX ORDER — OMEPRAZOLE 40 MG/1
CAPSULE, DELAYED RELEASE ORAL
Refills: 5 | COMMUNITY
Start: 2018-05-11 | End: 2019-01-01

## 2018-06-13 RX ORDER — MELOXICAM 15 MG/1
TABLET ORAL DAILY
Refills: 3 | COMMUNITY
Start: 2018-06-08 | End: 2018-08-22 | Stop reason: ALTCHOICE

## 2018-06-13 NOTE — PROGRESS NOTES
Subjective .     REASONS FOR FOLLOWUP:    1. History of stage I left breast cancer in 1998, ER/TX positive, status post mastectomy followed by tamoxifen x 5 years, completed in 07/2003.    2. Limited stage small cell lung cancer with right lower lobe mass, right hilar adenopathy.  Initiation of cisplatin/-16 chemotherapy, cycle 1 initiated 11/17/10.  Concurrent radiation therapy initiated 11/19/10 with Dr. Camacho.  Radiation therapy completed 01/11/11.   3. Status post Mediport placement on 12/27/10.  Mediport removed in November 2011.    4. Cycle #6 of cisplatin/-16 chemotherapy initiated 3/1/11.  Complete response seen on subsequent CT scan dated 3/28/11.       5. 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   6. Status post PCI, completed 5-27-11.     7. Right thyroid nodule    HISTORY OF PRESENT ILLNESS:  The patient is a 72 y.o. year old female who is here for follow-up with the above-mentioned history.    History of Present Illness   patient returns today in follow-up for a one year interval visit with multiple studies to review.  She underwent her annual mammogram/18/17 which was negative, BI-RADS 1.  She underwent follow-up thyroid ultrasound 12/19/17 which showed no change in the right thyroid nodule, no change since 2015 and this is felt to likely represent a benign finding.  She returns today with repeat CT of the chest rent sees low-dose annual) as well as laboratory studies to review.  She did spend the winter months in [place] with her .  She has had some progressive difficulty with short-term memory loss.  Her balance is poor in part related to peripheral neuropathy from her previous chemotherapy and she ambulates with the use of a walker although does not have it with her today.  She notes a normal appetite.  She does note some mild pain in her mid back bilaterally over the past few months however this is intermittent in nature.  She denies any pleuritic  pain.  She denies any respiratory symptoms.  Not experienced any recent infectious difficulties.  She did describe severe right foot pain to the medical assistant however did not report this to me today in our discussion.  She is accompanied today by her brother.    PAST MEDICAL HISTORY:   1. Chronic obstructive pulmonary disease.    2. Right low thoracic dermatome shingles in 02/12.    3. Cystoscopy with bladder biopsy in 05/2012.    4. Psoriasis identified in the right heel in 2013.    5. Osteoarthritis.  6. Colonoscopy 8/4/15 with 5 mm benign rectal polyp.  7. Right sided thyroid nodule.  8. Atrial fibrillation on anticoagulation with Eliquis 5 mg twice a day.  Pacemaker placed 3/13/17.    SURGICAL HISTORY:    1. Left mastectomy in 1998, left breast reconstruction and right breast implant placement in 1999.    2. Exploratory laparotomy.   3. Status post pacemaker placement left chest wall 3/13/17.     ONCOLOGIC HISTORY:  (History from previous dates can be found in the separate document.)  Past Medical History:   Diagnosis Date   • Aortic valve insufficiency    • Atrial fibrillation and flutter    • Axillary lump    • Breast cancer     Left, stage I; ER/OK positive   • Chronic kidney disease     Stage 3   • COPD (chronic obstructive pulmonary disease)    • Fatigue    • Lung cancer    • Neutropenia    • Osteoarthritis    • Paroxysmal atrial fibrillation    • Peripheral neuropathy     Secondary to cisplatin.   • Psoriasis     Identified in the right heel in 2013.   • Rectal polyp    • Shingles 02/2012    RIGHT LOW THORACIC    • Small cell lung cancer     Limited stage   • Thyroid nodule     RIGHT   • Wheezing      Past Surgical History:   Procedure Laterality Date   • BREAST IMPLANT SURGERY Right 1999   • BREAST RECONSTRUCTION Left 1999   • CARDIAC ELECTROPHYSIOLOGY PROCEDURE N/A 3/13/2017    Procedure: Device Implant  DDD pacer  medtronic;  Surgeon: Armani Kennedy MD;  Location: [facility] INVASIVE LOCATION;   Service:    • COLONOSCOPY     • CYSTOSCOPY BLADDER BIOPSY  2012   • EXPLORATORY LAPAROTOMY      Several years ago.   • MASTECTOMY Left    • MEDIPORT INSERTION, SINGLE           Current Outpatient Prescriptions on File Prior to Visit   Medication Sig Dispense Refill   • apixaban (ELIQUIS) 5 MG tablet tablet Take 1 tablet by mouth 2 (Two) Times a Day. 60 tablet 3   • budesonide-formoterol (SYMBICORT) 160-4.5 MCG/ACT inhaler Symbicort 160-4.5 MCG/ACT Inhalation Aerosol; Patient Sig: Symbicort 160-4.5 MCG/ACT Inhalation Aerosol ; 0; -2014; Active     • metoprolol tartrate (LOPRESSOR) 25 MG tablet Take 12.5 mg by mouth 2 (Two) Times a Day.     • tiotropium (SPIRIVA HANDIHALER) 18 MCG per inhalation capsule Spiriva HandiHaler 18 MCG Inhalation Capsule; Patient Sig: Spiriva HandiHaler 18 MCG Inhalation Capsule ; 0; 2014; Active     • [DISCONTINUED] ELIQUIS 5 MG tablet tablet TAKE 1 TABLET BY MOUTH TWICE DAILY 180 tablet 3     No current facility-administered medications on file prior to visit.        ALLERGIES:     Allergies   Allergen Reactions   • Azithromycin        SOCIAL HISTORY:  .  Drinks approximately three beers every other day.  Long-standing smoking history. No HIV risk factors.  Social History     Social History   • Marital status:      Spouse name: Dirk   • Number of children: N/A   • Years of education: N/A     Occupational History   •  Retired     Social History Main Topics   • Smoking status: Former Smoker     Start date: 2011   • Smokeless tobacco: Never Used      Comment: quit 6 years ago    //    caffeine use - one soft drink daily    • Alcohol use No      Comment: Occasional beer, approximately 3 beers every other day.   • Drug use: No   • Sexual activity: Defer     Other Topics Concern   • Not on file     Social History Narrative   • No narrative on file         FAMILY HISTORY:  Positive for melanoma in her mother, diagnosed at the age of 70 and  from  heart attack at age of 80.  Father had colon cancer at age 70 and  at age of 80 of abdominal carcinomatosis.    Family History   Problem Relation Age of Onset   • Heart disease Mother    • Heart attack Mother    • Melanoma Mother 70   • Colon cancer Father 70   • Cancer Father         Abdominal carcinomatosis   • Stroke Neg Hx        Review of Systems   Constitutional: Positive for fatigue. Negative for activity change, appetite change and unexpected weight change.   HENT: Negative for mouth sores, nosebleeds and voice change.    Respiratory: Negative for shortness of breath and wheezing.    Cardiovascular: Negative for palpitations and leg swelling.   Gastrointestinal: Negative for abdominal distention, blood in stool, constipation and diarrhea.   Endocrine: Negative for cold intolerance and heat intolerance.   Genitourinary: Negative for difficulty urinating, dysuria, frequency and hematuria.   Musculoskeletal: Positive for arthralgias, back pain and gait problem. Negative for neck pain.   Neurological: Positive for numbness. Negative for dizziness, syncope and light-headedness.   Hematological: Negative for adenopathy. Does not bruise/bleed easily.   Psychiatric/Behavioral: Negative for confusion and sleep disturbance. The patient is not nervous/anxious.          Objective      Vitals:    18 1308   BP: 140/80   Pulse: 75   Resp: 14   Temp: 98.8 °F (37.1 °C)   SpO2: 95%      Current Status 2018   ECOG score 0   Pain 10/10    Physical Exam   Constitutional: She is oriented to person, place, and time. She appears well-developed and well-nourished.   HENT:   Mouth/Throat: Oropharynx is clear and moist.   Eyes: Conjunctivae are normal.   Neck: No thyromegaly present.   Cardiovascular: Normal rate and regular rhythm.  Exam reveals no gallop and no friction rub.    No murmur heard.  Pulmonary/Chest: Breath sounds normal. No respiratory distress. Right breast exhibits no mass. Left breast exhibits no mass.    Status post left mastectomy with implant reconstruction and no abnormalities.  Right breast without abnormalities.   Abdominal: Soft. Bowel sounds are normal. She exhibits no distension. There is no tenderness.   Musculoskeletal: She exhibits no edema.   Lymphadenopathy:        Head (right side): No submandibular adenopathy present.     She has no cervical adenopathy.     She has no axillary adenopathy.        Right: No inguinal and no supraclavicular adenopathy present.        Left: No inguinal and no supraclavicular adenopathy present.   Neurological: She is alert and oriented to person, place, and time. She displays normal reflexes. No cranial nerve deficit. She exhibits normal muscle tone.   Skin: Skin is warm and dry. No rash noted.   Psychiatric: She has a normal mood and affect. Her behavior is normal.       RECENT LABS:  Hematology WBC   Date Value Ref Range Status   06/13/2018 6.74 4.00 - 10.00 10*3/mm3 Final     RBC   Date Value Ref Range Status   06/13/2018 4.34 3.90 - 5.00 10*6/mm3 Final     Hemoglobin   Date Value Ref Range Status   06/13/2018 12.9 11.5 - 14.9 g/dL Final     Hematocrit   Date Value Ref Range Status   06/13/2018 40.6 34.0 - 45.0 % Final     Platelets   Date Value Ref Range Status   06/13/2018 258 150 - 375 10*3/mm3 Final        Lab Results   Component Value Date    GLUCOSE 135 (H) 06/13/2018    BUN 24 (H) 06/13/2018    CREATININE 1.45 (H) 06/13/2018    EGFRIFNONA 35 (L) 06/13/2018    BCR 16.6 06/13/2018    CO2 24.1 06/13/2018    CALCIUM 9.7 06/13/2018    ALBUMIN 4.30 06/13/2018    LABIL2 1.4 06/13/2018    AST 17 06/13/2018    ALT 23 06/13/2018     Right mammogram 8/18/17: Negative, BI-RADS 1    Thyroid ultrasound 12/19/17:  ULTRASOUND FINDINGS: The right lobe measures 6.2 x 2.0 x 1.5 cm. Left  lobe measures 4.6 x 1.0 x 1.2 cm. The isthmus is 3 mm AP.     Solitary right thyroid nodule reidentified. The dimensions are quite  similar compared to the original 12/10/2015 examination when  measured at  similar landmarks. When maximum length measured by the technologist, it  ranges anywhere between 14 and 17 mm which is well within the range of  user error. Overall lack of significant growth since the initial  examination supports a benign etiology. If there is any clinical  concern, fine-needle aspiration can be performed. Otherwise continue  conservative sonographic surveillance.    CT chest, low-dose 6/6/18: I personally review CT images in the computer today and reviewed the images with the patient and her brother.     FINDINGS: There is a new small-moderate left pleural effusion and there  may be new mild right pleural thickening. Characterization is limited in  the absence of IV contrast and particularly with low-dose technique.  There is more prominent opacification or atelectasis along the right  major fissure at the right lower lobe. The 3 mm left upper lobe  pulmonary nodule is stable. There is a new 8 mm groundglass opacity at  the left upper lobe, image 49. There is no interval change in the shotty  mediastinal nodes or at the shotty axillary nodes. Evaluation for  lymphadenopathy along the right lower lobe bronchovascular bundle is  very limited.     IMPRESSION:  1. New small-moderate sized right pleural effusion and there may be new  right pleural thickening. Lymphadenopathy along the right lower lobe  bronchovascular bundle cannot be excluded given limitations of the  examination. Lung-RADS category 4B-C.  Further evaluation is recommended  with either a contrast enhanced chest CT and/or a PET/CT for restaging.  2. CTDI 2.35 mGy. DLP 74 mGycm.    Assessment/Plan     1. Limited stage small cell lung cancer: The patient completed concurrent chemoradiation with 6 cycles of cisplatin and -16 in March 2011 and achieved a complete response. She did receive PCI completing this in May 2011    The patient has continued with annual low-dose screening CT scan of the chest.  She returns today with  a follow-up study from 6/6/18 which we reviewed at length today.  Unfortunately this does show some new findings from which the patient is asymptomatic.  There is a small to moderate right pleural effusion as well as some right pleural thickening and some potential lymphadenopathy along the right lower lobe bronchovascular bundle and although difficult to identify with certainty.  I am suspicious of potential underlying malignancy as there does not appear to be any other explanation for the findings.  She has not experienced any recent infectious difficulties.  Therefore I have recommended that we pursue a PET scan as well as an ultrasound-guided thoracentesis with pleural fluid to be sent for protein, LDH, glucose, pH, cytology.  I will see her back in a few weeks to review all of these results.  The patient and her brother are aware of the suspicion for underlying malignancy.    2. History of stage I breast cancer on the left: The patient underwent a left mastectomy and completed 5 years of tamoxifen in July 2003. Her exam today is negative.  Annual mammogram 8/18/17 was negative, BI-RADS 1.  Her exam today is normal.  3. Peripheral neuropathy secondary to cisplatin: The patient has continued grade 2 peripheral neuropathy involving her feet. This does affect her balance; it is unchanged.   4. Stage III chronic kidney disease: The patient's creatinine has increased slightly further at 1.45 today, previously in the 1.1-1.2 range..   5. Right thyroid nodule: CT scan of the neck from 05/22/2015 in the [facility] did identify a right-sided 1.2 cm thyroid nodule of unclear significance and recommended further evaluation with ultrasound if necessary.  An ultrasound was performed 12/10/15 confirming a 1.4 cm solitary solid nodule in the right thyroid lobe.  A one-year follow-up ultrasound was performed on 12/19/17 showing stability in the right-sided nodule dating back to 2015.  With the length of stability, this  is likely a benign nodule.  We will have further discussion regarding the appropriateness of continued monitoring pending her above evaluation  6. Atrial fibrillation: The patient does continue on Eliquis.  She required pacemaker placement 3/13/17 after having a syncopal episode.  7. Mild dementia: The patient's short-term memory is very poor.  This has been an ongoing issue for her.  Her scans do show significant progression of small vessel ischemic change.  It is felt that some of this has been precipitated by her previous PCI.  8. Right neck pain: MRI of the cervical spine 12/21/16 showed no evidence of metastatic disease did show degenerative changes and canal narrowing with foraminal narrowing as well, likely the etiology of her pain.  Her pain resolved.    PLAN:   1. Add serum LDH to labs today  2. PET scan  3. Ultrasound-guided right thoracentesis with pleural fluid to be sent for pH, protein, LDH, glucose, and cytology.  4. M.D. visit in 2 weeks to review the above results.

## 2018-06-14 ENCOUNTER — APPOINTMENT (OUTPATIENT)
Dept: ULTRASOUND IMAGING | Facility: HOSPITAL | Age: 73
End: 2018-06-14
Attending: INTERNAL MEDICINE

## 2018-06-14 LAB — LDH SERPL-CCNC: 213 U/L (ref 99–259)

## 2018-06-15 ENCOUNTER — HOSPITAL ENCOUNTER (OUTPATIENT)
Dept: PET IMAGING | Facility: HOSPITAL | Age: 73
Discharge: HOME | End: 2018-06-15
Attending: INTERNAL MEDICINE | Admitting: INTERNAL MEDICINE

## 2018-06-15 ENCOUNTER — HOSPITAL ENCOUNTER (OUTPATIENT)
Dept: PET IMAGING | Facility: HOSPITAL | Age: 73
Discharge: HOME | End: 2018-06-15
Attending: INTERNAL MEDICINE

## 2018-06-15 DIAGNOSIS — Z85.118 PERSONAL HISTORY OF LUNG CANCER: ICD-10-CM

## 2018-06-15 DIAGNOSIS — R91.8 OTHER NONSPECIFIC ABNORMAL FINDING OF LUNG FIELD (CODE): ICD-10-CM

## 2018-06-15 LAB — GLUCOSE BLDC GLUCOMTR-MCNC: 115 MG/DL (ref 70–130)

## 2018-06-15 PROCEDURE — 82962 GLUCOSE BLOOD TEST: CPT

## 2018-06-15 PROCEDURE — A9552 F18 FDG: HCPCS | Performed by: INTERNAL MEDICINE

## 2018-06-15 PROCEDURE — 0 FLUDEOXYGLUCOSE F18 SOLUTION: Performed by: INTERNAL MEDICINE

## 2018-06-15 PROCEDURE — 78815 PET IMAGE W/CT SKULL-THIGH: CPT

## 2018-06-15 RX ADMIN — FLUDEOXYGLUCOSE F18 1 DOSE: 300 INJECTION INTRAVENOUS at 13:25

## 2018-06-20 ENCOUNTER — HOSPITAL ENCOUNTER (OUTPATIENT)
Dept: ULTRASOUND IMAGING | Facility: HOSPITAL | Age: 73
End: 2018-06-20
Attending: INTERNAL MEDICINE

## 2018-06-20 ENCOUNTER — HOSPITAL ENCOUNTER (OUTPATIENT)
Dept: ULTRASOUND IMAGING | Facility: HOSPITAL | Age: 73
Discharge: HOME | End: 2018-06-20
Attending: INTERNAL MEDICINE | Admitting: INTERNAL MEDICINE

## 2018-06-20 VITALS
WEIGHT: 210 LBS | TEMPERATURE: 97.5 F | BODY MASS INDEX: 33.75 KG/M2 | OXYGEN SATURATION: 98 % | HEART RATE: 94 BPM | RESPIRATION RATE: 20 BRPM | DIASTOLIC BLOOD PRESSURE: 78 MMHG | HEIGHT: 66 IN | SYSTOLIC BLOOD PRESSURE: 132 MMHG

## 2018-06-20 DIAGNOSIS — C34.91 SMALL CELL CARCINOMA OF RIGHT LUNG (HCC): ICD-10-CM

## 2018-06-20 LAB
APPEARANCE FLD: ABNORMAL
COLOR FLD: YELLOW
EOSINOPHIL NFR FLD MANUAL: 1 %
GLUCOSE FLD-MCNC: 128 MG/DL
INR PPP: 1.1 (ref 0.8–1.2)
LDH FLD-CCNC: 122 U/L
LYMPHOCYTES NFR FLD MANUAL: 90 %
NEUTROPHILS NFR FLD MANUAL: 9 %
PH FLD: 7.5 [PH]
PROT FLD-MCNC: 1.8 G/DL
PROTHROMBIN TIME: 12.9 SECONDS (ref 12.8–15.2)
RBC # FLD AUTO: 1455 /MM3
WBC # FLD: 815 /MM3

## 2018-06-20 PROCEDURE — 76942 ECHO GUIDE FOR BIOPSY: CPT

## 2018-06-20 PROCEDURE — 82945 GLUCOSE OTHER FLUID: CPT | Performed by: INTERNAL MEDICINE

## 2018-06-20 PROCEDURE — 83615 LACTATE (LD) (LDH) ENZYME: CPT | Performed by: INTERNAL MEDICINE

## 2018-06-20 PROCEDURE — 88305 TISSUE EXAM BY PATHOLOGIST: CPT | Performed by: INTERNAL MEDICINE

## 2018-06-20 PROCEDURE — 83986 ASSAY PH BODY FLUID NOS: CPT | Performed by: INTERNAL MEDICINE

## 2018-06-20 PROCEDURE — 36416 COLLJ CAPILLARY BLOOD SPEC: CPT

## 2018-06-20 PROCEDURE — 89051 BODY FLUID CELL COUNT: CPT | Performed by: INTERNAL MEDICINE

## 2018-06-20 PROCEDURE — 88112 CYTOPATH CELL ENHANCE TECH: CPT | Performed by: INTERNAL MEDICINE

## 2018-06-20 PROCEDURE — 84157 ASSAY OF PROTEIN OTHER: CPT | Performed by: INTERNAL MEDICINE

## 2018-06-20 RX ORDER — LIDOCAINE HYDROCHLORIDE 10 MG/ML
20 INJECTION, SOLUTION INFILTRATION; PERINEURAL ONCE
Status: DISCONTINUED | OUTPATIENT
Start: 2018-06-20 | End: 2018-06-20

## 2018-06-20 RX ORDER — LIDOCAINE HYDROCHLORIDE 10 MG/ML
20 INJECTION, SOLUTION INFILTRATION; PERINEURAL ONCE
Status: COMPLETED | OUTPATIENT
Start: 2018-06-20 | End: 2018-06-20

## 2018-06-20 RX ADMIN — LIDOCAINE HYDROCHLORIDE 20 ML: 10 INJECTION, SOLUTION INFILTRATION; PERINEURAL at 08:36

## 2018-06-20 NOTE — DISCHARGE INSTRUCTIONS
..EDUCATION /DISCHARGE INSTRUCTIONS Thoracentesis:  A thoracentesis is a procedure to remove fluid or air from the space between the lung and it's lining.  It is done to relieve lung compression and respiratory distress.  A sample can also be obtained to aid diagnosis.   Medications can be administered into the space.      During the procedure:  You will be seated comfortable leaning forward onto a pillow supported by a table.  The area will be cleaned with antiseptic soap and draped with a sterile towel.  The physician will administer a local antiseptic to numb the area.  Next, the physician will insert a needle with tubing attached into the space between the lung and lining.  Fluid is removed and a sample sent to the laboratory.   When completed a pressure dressing is applied to the site.  A chest x-ray is performed to ensure the lung is functioning properly.    Risks of the procedure include but are not limited to:   *  Bleeding    *  Low blood pressure *  Infection     *  Lung collapse possibly requiring insertion of a tube to re-inflate the lung.    Benefits of the procedure:  Relief of respiratory distress and facilitation of a diagnosis.    Alternatives to the procedure:  Drug therapy with diuretics to remove fluid.  Risks of diuretic drug therapy include possible dehydration and renal failure.  The benefit of drug therapy is that it can be done at home under physician supervision.  THIS EDUCATION INFORMATION WAS REVIEWED PRIOR TO THE PROCEDURE AND CONSENT. Patient initials___________________Time__________________    Post Procedure:    *  Rest today (no pushing pulling, straining or heavy lifting).   *  Slowly increase activity tomorow.    *  If you received sedation do not drive for 24 hours.   *  Keep dressing clean and dry.   *  Leave dressing on puncture site for 24 hours.    *  You may shower when dressing removed.   *  Expect some coughing as the lung re-expands.    Call your doctor if experiencing:   *   If experiencing sudden / severe shortness of breath, chest pain or if coughing       up blood go to the nearest emergency room.   *  Signs of infection such as redness, swelling, excessive pain and / or foul       smelling drainage from the puncture site.   *  Chills or fever over 101 degrees (by mouth).   *  Change in sputum color (yellow, green, red).   *  Unrelieved pain.   *  Any new or severe symptoms.  Following the procedure:     Follow-up with the ordering physician as directed.    Continue to take other medications as directed by your physician unless    otherwise instructed.   If applicable, resume taking your blood thinners or Aspirin on ___________.    If you have any concerns please call the Radiology Nurses Desk at [phone].  You are the most important factor in your recovery.  Follow the above instructions carefully.

## 2018-06-20 NOTE — H&P
Name: Hui Workman ADMIT: 2018   : 1945  PCP: Anitha Haynes MD    MRN: [id number] LOS: 0 days   AGE/SEX: 72 y.o. female  ROOM: Room/bed info not found       Chief complaint   Patient is a 72 y.o. female presents with right pleural fluid.     Past Surgical History:  Past Surgical History:   Procedure Laterality Date   • BREAST IMPLANT SURGERY Right    • BREAST RECONSTRUCTION Left    • CARDIAC ELECTROPHYSIOLOGY PROCEDURE N/A 3/13/2017    Procedure: Device Implant  DDD pacer  medtronic;  Surgeon: Armani Kennedy MD;  Location: [facility] INVASIVE LOCATION;  Service:    • COLONOSCOPY     • CYSTOSCOPY BLADDER BIOPSY  2012   • EXPLORATORY LAPAROTOMY      Several years ago.   • MASTECTOMY Left    • MEDIPORT INSERTION, SINGLE         Past Medical History:  Past Medical History:   Diagnosis Date   • Aortic valve insufficiency    • Atrial fibrillation and flutter    • Axillary lump    • Breast cancer     Left, stage I; ER/IN positive   • Chronic kidney disease     Stage 3   • COPD (chronic obstructive pulmonary disease)    • Fatigue    • Lung cancer    • Neutropenia    • Osteoarthritis    • Paroxysmal atrial fibrillation    • Peripheral neuropathy     Secondary to cisplatin.   • Psoriasis     Identified in the right heel in .   • Rectal polyp    • Shingles 2012    RIGHT LOW THORACIC    • Small cell lung cancer     Limited stage   • Thyroid nodule     RIGHT   • Wheezing        Home Medications:    (Not in a hospital admission)    Allergies:  Azithromycin    Family History:  Family History   Problem Relation Age of Onset   • Heart disease Mother    • Heart attack Mother    • Melanoma Mother 70   • Colon cancer Father 70   • Cancer Father         Abdominal carcinomatosis   • Stroke Neg Hx        Social History:  Social History   Substance Use Topics   • Smoking status: Former Smoker     Start date: 2011   • Smokeless tobacco: Never Used      Comment: quit 6 years  ago    //    caffeine use - one soft drink daily    • Alcohol use No      Comment: Occasional beer, approximately 3 beers every other day.        Objective     Physical Exam:   Pleural fluid    Vital Signs       Anticipated Surgical Procedure:  thoracentesis    The risks, benefits and alternatives of this procedure have been discussed with the patient or responsible party: Yes        Aman Munoz MD  06/20/18  7:06 AM

## 2018-06-20 NOTE — PROGRESS NOTES
Assessment/Plan   Assessment:   Condition: In stable condition.     Plan:  Discharge home.  Activity Plan: NO PUSHING,PULLING,STRAINING OR HEAVY LIFTING TODY.  Wound care plan: TEGADERM AND GAUZE MAY BE REMOVED IN 24 HOURS-DO NOT REMOVE STERI STRIPS ALLOW TO FOLLOW OFF ON THEIR OWN.  Respiratory Plan: CONTINUE INHALERS AS PER HOME MED LIST.  Consults: RESTART ELIQUIS AS INSTRUCTED BY CARDIOLOGIST.  Diet Plan: CONTINUE HOME DIET.  Meds administered: TO RESTART ELIQUIS PER CARDIOLOGY.       Subjective   Subjective  Temp:  [97.5 °F (36.4 °C)] 97.5 °F (36.4 °C)  Heart Rate:  [] 94  Resp:  [20] 20  BP: (130-176)/(70-94) 132/78  @PMPHCR1ZFZFKP()@  @IOTHISSHIFT()@    Objective   Objective  Active Problems:    * No active hospital problems. *

## 2018-06-21 ENCOUNTER — TELEPHONE (OUTPATIENT)
Dept: INTERVENTIONAL RADIOLOGY/VASCULAR | Facility: HOSPITAL | Age: 73
End: 2018-06-21

## 2018-06-21 LAB
CYTO UR: NORMAL
LAB AP CASE REPORT: NORMAL
PATH REPORT.FINAL DX SPEC: NORMAL
PATH REPORT.GROSS SPEC: NORMAL

## 2018-06-27 ENCOUNTER — OFFICE VISIT (OUTPATIENT)
Dept: ONCOLOGY | Facility: CLINIC | Age: 73
End: 2018-06-27

## 2018-06-27 ENCOUNTER — LAB (OUTPATIENT)
Dept: LAB | Facility: HOSPITAL | Age: 73
End: 2018-06-27

## 2018-06-27 VITALS
WEIGHT: 208.4 LBS | HEART RATE: 84 BPM | TEMPERATURE: 98.3 F | RESPIRATION RATE: 14 BRPM | SYSTOLIC BLOOD PRESSURE: 122 MMHG | BODY MASS INDEX: 33.49 KG/M2 | HEIGHT: 66 IN | DIASTOLIC BLOOD PRESSURE: 70 MMHG | OXYGEN SATURATION: 94 %

## 2018-06-27 DIAGNOSIS — G62.0 CHEMOTHERAPY-INDUCED NEUROPATHY (HCC): ICD-10-CM

## 2018-06-27 DIAGNOSIS — C34.91 SMALL CELL CARCINOMA OF RIGHT LUNG (HCC): Primary | ICD-10-CM

## 2018-06-27 DIAGNOSIS — Z85.3 HISTORY OF LEFT BREAST CANCER: ICD-10-CM

## 2018-06-27 DIAGNOSIS — R53.82 CHRONIC FATIGUE: ICD-10-CM

## 2018-06-27 DIAGNOSIS — Z85.3 HISTORY OF LEFT BREAST CANCER: Primary | ICD-10-CM

## 2018-06-27 DIAGNOSIS — J90 PLEURAL EFFUSION, RIGHT: ICD-10-CM

## 2018-06-27 DIAGNOSIS — T45.1X5A CHEMOTHERAPY-INDUCED NEUROPATHY (HCC): ICD-10-CM

## 2018-06-27 LAB
BASOPHILS # BLD AUTO: 0.06 10*3/MM3 (ref 0–0.1)
BASOPHILS NFR BLD AUTO: 0.8 % (ref 0–1.1)
DEPRECATED RDW RBC AUTO: 48.4 FL (ref 37–49)
EOSINOPHIL # BLD AUTO: 0.14 10*3/MM3 (ref 0–0.36)
EOSINOPHIL NFR BLD AUTO: 1.8 % (ref 1–5)
ERYTHROCYTE [DISTWIDTH] IN BLOOD BY AUTOMATED COUNT: 14.3 % (ref 11.7–14.5)
HCT VFR BLD AUTO: 38.3 % (ref 34–45)
HGB BLD-MCNC: 12.3 G/DL (ref 11.5–14.9)
IMM GRANULOCYTES # BLD: 0.04 10*3/MM3 (ref 0–0.03)
IMM GRANULOCYTES NFR BLD: 0.5 % (ref 0–0.5)
LYMPHOCYTES # BLD AUTO: 1.2 10*3/MM3 (ref 1–3.5)
LYMPHOCYTES NFR BLD AUTO: 15 % (ref 20–49)
MCH RBC QN AUTO: 29.8 PG (ref 27–33)
MCHC RBC AUTO-ENTMCNC: 32.1 G/DL (ref 32–35)
MCV RBC AUTO: 92.7 FL (ref 83–97)
MONOCYTES # BLD AUTO: 0.55 10*3/MM3 (ref 0.25–0.8)
MONOCYTES NFR BLD AUTO: 6.9 % (ref 4–12)
NEUTROPHILS # BLD AUTO: 6.01 10*3/MM3 (ref 1.5–7)
NEUTROPHILS NFR BLD AUTO: 75 % (ref 39–75)
NRBC BLD MANUAL-RTO: 0 /100 WBC (ref 0–0)
PLATELET # BLD AUTO: 233 10*3/MM3 (ref 150–375)
PMV BLD AUTO: 10 FL (ref 8.9–12.1)
RBC # BLD AUTO: 4.13 10*6/MM3 (ref 3.9–5)
WBC NRBC COR # BLD: 8 10*3/MM3 (ref 4–10)

## 2018-06-27 PROCEDURE — 85025 COMPLETE CBC W/AUTO DIFF WBC: CPT | Performed by: INTERNAL MEDICINE

## 2018-06-27 PROCEDURE — 36415 COLL VENOUS BLD VENIPUNCTURE: CPT | Performed by: INTERNAL MEDICINE

## 2018-06-27 PROCEDURE — 99215 OFFICE O/P EST HI 40 MIN: CPT | Performed by: INTERNAL MEDICINE

## 2018-06-27 NOTE — PROGRESS NOTES
Subjective .     REASONS FOR FOLLOWUP:    1. History of stage I left breast cancer in 1998, ER/PA positive, status post mastectomy followed by tamoxifen x 5 years, completed in 07/2003.    2. Limited stage small cell lung cancer with right lower lobe mass, right hilar adenopathy.  Initiation of cisplatin/-16 chemotherapy, cycle 1 initiated 11/17/10.  Concurrent radiation therapy initiated 11/19/10 with Dr. Camacho.  Radiation therapy completed 01/11/11.   3. Status post Mediport placement on 12/27/10.  Mediport removed in November 2011.    4. Cycle #6 of cisplatin/-16 chemotherapy initiated 3/1/11.  Complete response seen on subsequent CT scan dated 3/28/11.       5. 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   6. Status post PCI, completed 5-27-11.     7. Right thyroid nodule    HISTORY OF PRESENT ILLNESS:  The patient is a 72 y.o. year old female who is here for follow-up with the above-mentioned history.    History of Present Illness   the patient returns today for short interval follow-up visit having undergone PET scan and ultrasound-guided thoracentesis in the interval.  She tolerated the thoracentesis well, had no difficulties.  She continues without any significant respiratory symptoms apart from her chronic dyspnea on exertion and occasional chronic cough.  She reports a normal appetite denies any weight loss.  She denies any GI symptoms.  She is accompanied by multiple family members today.    PAST MEDICAL HISTORY:   1. Chronic obstructive pulmonary disease.    2. Right low thoracic dermatome shingles in 02/12.    3. Cystoscopy with bladder biopsy in 05/2012.    4. Psoriasis identified in the right heel in 2013.    5. Osteoarthritis.  6. Colonoscopy 8/4/15 with 5 mm benign rectal polyp.  7. Right sided thyroid nodule.  8. Atrial fibrillation on anticoagulation with Eliquis 5 mg twice a day.  Pacemaker placed 3/13/17.    SURGICAL HISTORY:    1. Left mastectomy in 1998,  left breast reconstruction and right breast implant placement in 1999.    2. Exploratory laparotomy.   3. Status post pacemaker placement left chest wall 3/13/17.     ONCOLOGIC HISTORY:  (History from previous dates can be found in the separate document.)  Past Medical History:   Diagnosis Date   • Aortic valve insufficiency    • Atrial fibrillation and flutter    • Axillary lump    • Breast cancer     Left, stage I; ER/LA positive   • Chronic kidney disease     Stage 3   • COPD (chronic obstructive pulmonary disease)    • Fatigue    • Lung cancer    • Neutropenia    • Osteoarthritis    • Paroxysmal atrial fibrillation    • Peripheral neuropathy     Secondary to cisplatin.   • Psoriasis     Identified in the right heel in 2013.   • Rectal polyp    • Shingles 02/2012    RIGHT LOW THORACIC    • Small cell lung cancer     Limited stage   • Thyroid nodule     RIGHT   • Wheezing      Past Surgical History:   Procedure Laterality Date   • BREAST IMPLANT SURGERY Right 1999   • BREAST RECONSTRUCTION Left 1999   • CARDIAC ELECTROPHYSIOLOGY PROCEDURE N/A 3/13/2017    Procedure: Device Implant  DDD pacer  medtronic;  Surgeon: Armani Kennedy MD;  Location: [facility] INVASIVE LOCATION;  Service:    • COLONOSCOPY     • CYSTOSCOPY BLADDER BIOPSY  05/2012   • EXPLORATORY LAPAROTOMY      Several years ago.   • MASTECTOMY Left 1998   • MEDIPORT INSERTION, SINGLE     • MEDIPORT REMOVAL Right    • PACEMAKER IMPLANTATION           Current Outpatient Prescriptions on File Prior to Visit   Medication Sig Dispense Refill   • apixaban (ELIQUIS) 5 MG tablet tablet Take 1 tablet by mouth 2 (Two) Times a Day. (Patient taking differently: Take 5 mg by mouth 2 (Two) Times a Day. Held x 4 days for us thoracentesis) 60 tablet 3   • budesonide-formoterol (SYMBICORT) 160-4.5 MCG/ACT inhaler Symbicort 160-4.5 MCG/ACT Inhalation Aerosol; Patient Sig: Symbicort 160-4.5 MCG/ACT Inhalation Aerosol ; 0; 28-Nov-2014; Active     • meloxicam (MOBIC)  15 MG tablet Take  by mouth Daily.  3   • metoprolol tartrate (LOPRESSOR) 25 MG tablet Take 12.5 mg by mouth 2 (Two) Times a Day.     • omeprazole (priLOSEC) 40 MG capsule TK 1 C PO QD  5   • PROAIR  (90 Base) MCG/ACT inhaler INL 2 PFS PO Q 4 H PRN  5   • tiotropium (SPIRIVA HANDIHALER) 18 MCG per inhalation capsule Spiriva HandiHaler 18 MCG Inhalation Capsule; Patient Sig: Spiriva HandiHaler 18 MCG Inhalation Capsule ; 0; -2014; Active       No current facility-administered medications on file prior to visit.        ALLERGIES:     Allergies   Allergen Reactions   • Azithromycin        SOCIAL HISTORY:  .  Drinks approximately three beers every other day.  Long-standing smoking history. No HIV risk factors.  Social History     Social History   • Marital status:      Spouse name: Dirk   • Number of children: N/A   • Years of education: N/A     Occupational History   •  Retired     Social History Main Topics   • Smoking status: Former Smoker     Start date: 2011   • Smokeless tobacco: Never Used      Comment: quit 6 years ago    //    caffeine use - one soft drink daily    • Alcohol use No      Comment: Occasional beer, approximately 3 beers every other day.   • Drug use: No   • Sexual activity: Defer     Other Topics Concern   • Not on file     Social History Narrative   • No narrative on file         FAMILY HISTORY:  Positive for melanoma in her mother, diagnosed at the age of 70 and  from heart attack at age of 80.  Father had colon cancer at age 70 and  at age of 80 of abdominal carcinomatosis.    Family History   Problem Relation Age of Onset   • Heart disease Mother    • Heart attack Mother    • Melanoma Mother 70   • Colon cancer Father 70   • Cancer Father         Abdominal carcinomatosis   • Stroke Neg Hx        Review of Systems   Constitutional: Positive for fatigue. Negative for activity change, appetite change and unexpected weight change.   HENT: Negative for  mouth sores, nosebleeds and voice change.    Respiratory: Negative for shortness of breath and wheezing.    Cardiovascular: Negative for palpitations and leg swelling.   Gastrointestinal: Negative for abdominal distention, blood in stool, constipation and diarrhea.   Endocrine: Negative for cold intolerance and heat intolerance.   Genitourinary: Negative for difficulty urinating, dysuria, frequency and hematuria.   Musculoskeletal: Positive for arthralgias, back pain and gait problem. Negative for neck pain.   Neurological: Positive for numbness. Negative for dizziness, syncope and light-headedness.   Hematological: Negative for adenopathy. Does not bruise/bleed easily.   Psychiatric/Behavioral: Negative for confusion and sleep disturbance. The patient is not nervous/anxious.          Objective      Vitals:    06/27/18 1212   BP: 122/70   Pulse: 84   Resp: 14   Temp: 98.3 °F (36.8 °C)   SpO2: 94%      Current Status 6/27/2018   ECOG score 0   Pain 10/10    Physical Exam   Constitutional: She is oriented to person, place, and time. She appears well-developed and well-nourished.   HENT:   Mouth/Throat: Oropharynx is clear and moist.   Eyes: Conjunctivae are normal.   Neck: No thyromegaly present.   Cardiovascular: Normal rate and regular rhythm.  Exam reveals no gallop and no friction rub.    No murmur heard.  Pulmonary/Chest: Breath sounds normal. No respiratory distress. Right breast exhibits no mass. Left breast exhibits no mass.   Steri-Strips in place in the right posterior thorax at site of thoracentesis.   Abdominal: Soft. Bowel sounds are normal. She exhibits no distension. There is no tenderness.   Musculoskeletal: She exhibits no edema.   Lymphadenopathy:        Head (right side): No submandibular adenopathy present.     She has no cervical adenopathy.     She has no axillary adenopathy.        Right: No inguinal and no supraclavicular adenopathy present.        Left: No inguinal and no supraclavicular  adenopathy present.   Neurological: She is alert and oriented to person, place, and time. She displays normal reflexes. No cranial nerve deficit. She exhibits normal muscle tone.   Skin: Skin is warm and dry. No rash noted.   Psychiatric: She has a normal mood and affect. Her behavior is normal.       RECENT LABS:  Hematology WBC   Date Value Ref Range Status   06/27/2018 8.00 4.00 - 10.00 10*3/mm3 Final     RBC   Date Value Ref Range Status   06/27/2018 4.13 3.90 - 5.00 10*6/mm3 Final     Hemoglobin   Date Value Ref Range Status   06/27/2018 12.3 11.5 - 14.9 g/dL Final     Hematocrit   Date Value Ref Range Status   06/27/2018 38.3 34.0 - 45.0 % Final     Platelets   Date Value Ref Range Status   06/27/2018 233 150 - 375 10*3/mm3 Final        Lab Results   Component Value Date    GLUCOSE 135 (H) 06/13/2018    BUN 24 (H) 06/13/2018    CREATININE 1.45 (H) 06/13/2018    EGFRIFNONA 35 (L) 06/13/2018    BCR 16.6 06/13/2018    CO2 24.1 06/13/2018    CALCIUM 9.7 06/13/2018    ALBUMIN 4.30 06/13/2018    LABIL2 1.4 06/13/2018    AST 17 06/13/2018    ALT 23 06/13/2018     Right mammogram 8/18/17: Negative, BI-RADS 1    Thyroid ultrasound 12/19/17:  ULTRASOUND FINDINGS: The right lobe measures 6.2 x 2.0 x 1.5 cm. Left  lobe measures 4.6 x 1.0 x 1.2 cm. The isthmus is 3 mm AP.     Solitary right thyroid nodule reidentified. The dimensions are quite  similar compared to the original 12/10/2015 examination when measured at  similar landmarks. When maximum length measured by the technologist, it  ranges anywhere between 14 and 17 mm which is well within the range of  user error. Overall lack of significant growth since the initial  examination supports a benign etiology. If there is any clinical  concern, fine-needle aspiration can be performed. Otherwise continue  conservative sonographic surveillance.    CT chest, low-dose 6/6/18: I personally review CT images in the computer today and reviewed the images with the patient and  her brother.     FINDINGS: There is a new small-moderate left pleural effusion and there  may be new mild right pleural thickening. Characterization is limited in  the absence of IV contrast and particularly with low-dose technique.  There is more prominent opacification or atelectasis along the right  major fissure at the right lower lobe. The 3 mm left upper lobe  pulmonary nodule is stable. There is a new 8 mm groundglass opacity at  the left upper lobe, image 49. There is no interval change in the shotty  mediastinal nodes or at the shotty axillary nodes. Evaluation for  lymphadenopathy along the right lower lobe bronchovascular bundle is  very limited.     IMPRESSION:  1. New small-moderate sized right pleural effusion and there may be new  right pleural thickening. Lymphadenopathy along the right lower lobe  bronchovascular bundle cannot be excluded given limitations of the  examination. Lung-RADS category 4B-C.  Further evaluation is recommended  with either a contrast enhanced chest CT and/or a PET/CT for restaging.  2. CTDI 2.35 mGy. DLP 74 mGycm.    PET scan 6/15/18: I did personally review PET scan images in the computer.  IMPRESSION:  1. There is nonspecific low level activity within the  small-moderate-sized right pleural effusion and along the right lower  lobe bronchovascular bundle. There is no definitive suspicious  hypermetabolic activity to explain the new right pleural effusion.  Diagnostic thoracentesis is suggested.  2. The hypermetabolic activity at the distal aspect of the rectum may be  physiological. If the patient has not had a recent colonoscopy, further  evaluation with colonoscopy should be considered.    Ultrasound-guided thoracentesis right 6/20/18:  250 cc clear yellow fluid removed, pathology with no evidence of malignant cells, LDH 90, total protein 1.8, pH 7.5 (transudate).      Assessment/Plan     1. Limited stage small cell lung cancer: The patient completed concurrent  chemoradiation with 6 cycles of cisplatin and -16 in March 2011 and achieved a complete response. She did receive PCI completing this in May 2011    The patient continued with annual low-dose screening CT scan of the chest.  Follow-up study from 6/6/18 showed some new findings from which the patient was asymptomatic.  There was a small to moderate right pleural effusion as well as right pleural thickening and potential lymphadenopathy along the right lower lobe bronchovascular bundle although difficult to identify with certainty.  There was suspicion for potential underlying malignancy.    The patient therefore returns today with PET scan to review from 6/15/18 showing no significant change in the right pleural effusion with low level activity in the right lower lobe bronchovascular bundle SUV of only 3.7.  There was therefore no definitive suspicious hypermetabolic activity to explain the pleural effusion.  She did undergo a diagnostic right thoracentesis on 6/20/18 250 cc clear yellow fluid removed with LDH 90, pH 7.5, total protein 1.8, appearing transudative with pathology showing no evidence of malignant cells.  I reviewed all these results with the patient and her family members today.  Fortunately there is no significant evidence of malignancy.  We did discuss potential error in sensitivity with sampling the pleural fluid.  We discussed potential additional etiologies for her pleural effusion including cardiac etiology.  She is seeing her primary care physician soon and believes that she has an appointment with her cardiologist as well and may need a follow-up echocardiogram.  I did recommend that we follow this up with a repeat CT 2 months and she agrees.  She will call in the interval if she develops any new symptoms.    2. History of stage I breast cancer on the left: The patient underwent a left mastectomy and completed 5 years of tamoxifen in July 2003. Her exam today is negative.  Annual mammogram  8/18/17 was negative, BI-RADS 1.  Her exam was normal on 6/13/18.  3. Peripheral neuropathy secondary to cisplatin: The patient has continued grade 2 peripheral neuropathy involving her feet. This does affect her balance; it is unchanged.   4. Stage III chronic kidney disease: The patient's creatinine increased slightly further at 1.45 today, previously in the 1.1-1.2 range..   5. Right thyroid nodule: CT scan of the neck from 05/22/2015 in the [facility] system did identify a right-sided 1.2 cm thyroid nodule of unclear significance and recommended further evaluation with ultrasound if necessary.  An ultrasound was performed 12/10/15 confirming a 1.4 cm solitary solid nodule in the right thyroid lobe.  A one-year follow-up ultrasound was performed on 12/19/17 showing stability in the right-sided nodule dating back to 2015.  With the length of stability, this is likely a benign nodule.  PET scan was negative in the thyroid on 6/15/18.  6. Atrial fibrillation: The patient does continue on Eliquis.  She required pacemaker placement 3/13/17 after having a syncopal episode.  As noted above, with potential cardiac source of her pleural effusion she will need follow-up with cardiology.  7. Mild dementia: The patient's short-term memory is very poor.  This has been an ongoing issue for her.  Her scans do show significant progression of small vessel ischemic change.  It is felt that some of this has been precipitated by her previous PCI.  8. Right neck pain: MRI of the cervical spine 12/21/16 showed no evidence of metastatic disease did show degenerative changes and canal narrowing with foraminal narrowing as well, likely the etiology of her pain.  Her pain resolved.  9. Sigmoid colon uptake on PET scan: The PET scan did show activity in the distal sigmoid colon/rectum and the patient is referred back to her gastroenterologist Dr. Colton Watkins evaluation area and this likely is physiologic in nature however endoscopic  evaluation is likely required.  She has no current GI symptoms.    PLAN:   1. Refer back to GI Dr. Colton Watkins for potential colonoscopy regarding abnormal PET finding in the rectum  2. Follow-up with PCP and cardiology regarding etiology of pleural effusion  3. M.D. visit in 2 months with CBC, CMP.  One week prior we will obtain CT chest.

## 2018-07-10 ENCOUNTER — TRANSCRIBE ORDERS (OUTPATIENT)
Dept: ADMINISTRATIVE | Facility: HOSPITAL | Age: 73
End: 2018-07-10

## 2018-07-10 DIAGNOSIS — J90 PLEURAL EFFUSION: ICD-10-CM

## 2018-07-10 DIAGNOSIS — I48.91 ATRIAL FIBRILLATION, UNSPECIFIED TYPE (HCC): Primary | ICD-10-CM

## 2018-07-10 DIAGNOSIS — R06.00 DYSPNEA, UNSPECIFIED TYPE: ICD-10-CM

## 2018-07-10 DIAGNOSIS — J44.9 CHRONIC OBSTRUCTIVE PULMONARY DISEASE, UNSPECIFIED COPD TYPE (HCC): ICD-10-CM

## 2018-07-10 DIAGNOSIS — J90 PLEURAL EFFUSION: Primary | ICD-10-CM

## 2018-07-13 ENCOUNTER — HOSPITAL ENCOUNTER (OUTPATIENT)
Dept: CT IMAGING | Facility: HOSPITAL | Age: 73
Discharge: HOME | End: 2018-07-13

## 2018-07-13 ENCOUNTER — HOSPITAL ENCOUNTER (OUTPATIENT)
Dept: CARDIOLOGY | Facility: HOSPITAL | Age: 73
Discharge: HOME | End: 2018-07-13
Admitting: INTERNAL MEDICINE

## 2018-07-13 VITALS
WEIGHT: 208 LBS | DIASTOLIC BLOOD PRESSURE: 72 MMHG | HEART RATE: 85 BPM | HEIGHT: 66 IN | SYSTOLIC BLOOD PRESSURE: 124 MMHG | BODY MASS INDEX: 33.43 KG/M2

## 2018-07-13 DIAGNOSIS — I48.91 ATRIAL FIBRILLATION, UNSPECIFIED TYPE (HCC): ICD-10-CM

## 2018-07-13 DIAGNOSIS — R06.00 DYSPNEA, UNSPECIFIED TYPE: ICD-10-CM

## 2018-07-13 DIAGNOSIS — J44.9 CHRONIC OBSTRUCTIVE PULMONARY DISEASE, UNSPECIFIED COPD TYPE (HCC): ICD-10-CM

## 2018-07-13 DIAGNOSIS — J90 PLEURAL EFFUSION: ICD-10-CM

## 2018-07-13 LAB
BH CV ECHO MEAS - ACS: 1.6 CM
BH CV ECHO MEAS - AI DEC SLOPE: 284.3 CM/SEC^2
BH CV ECHO MEAS - AI MAX PG: 68.8 MMHG
BH CV ECHO MEAS - AI MAX VEL: 414.8 CM/SEC
BH CV ECHO MEAS - AI P1/2T: 427.3 MSEC
BH CV ECHO MEAS - AO MAX PG (FULL): 4.7 MMHG
BH CV ECHO MEAS - AO MAX PG: 11.5 MMHG
BH CV ECHO MEAS - AO MEAN PG (FULL): 1.9 MMHG
BH CV ECHO MEAS - AO MEAN PG: 6.1 MMHG
BH CV ECHO MEAS - AO ROOT AREA (BSA CORRECTED): 1.5
BH CV ECHO MEAS - AO ROOT AREA: 7.8 CM^2
BH CV ECHO MEAS - AO ROOT DIAM: 3.1 CM
BH CV ECHO MEAS - AO V2 MAX: 169.4 CM/SEC
BH CV ECHO MEAS - AO V2 MEAN: 114.6 CM/SEC
BH CV ECHO MEAS - AO V2 VTI: 35.1 CM
BH CV ECHO MEAS - AVA(I,A): 1.8 CM^2
BH CV ECHO MEAS - AVA(I,D): 1.8 CM^2
BH CV ECHO MEAS - AVA(V,A): 1.9 CM^2
BH CV ECHO MEAS - AVA(V,D): 1.9 CM^2
BH CV ECHO MEAS - BSA(HAYCOCK): 2.1 M^2
BH CV ECHO MEAS - BSA: 2 M^2
BH CV ECHO MEAS - BZI_BMI: 33.6 KILOGRAMS/M^2
BH CV ECHO MEAS - BZI_METRIC_HEIGHT: 167.6 CM
BH CV ECHO MEAS - BZI_METRIC_WEIGHT: 94.3 KG
BH CV ECHO MEAS - CONTRAST EF (2CH): 64.7 ML/M^2
BH CV ECHO MEAS - CONTRAST EF 4CH: 58.5 ML/M^2
BH CV ECHO MEAS - EDV(MOD-SP2): 34 ML
BH CV ECHO MEAS - EDV(MOD-SP4): 41 ML
BH CV ECHO MEAS - EDV(TEICH): 124.9 ML
BH CV ECHO MEAS - EF(CUBED): 46.6 %
BH CV ECHO MEAS - EF(MOD-BP): 62 %
BH CV ECHO MEAS - EF(MOD-SP2): 64.7 %
BH CV ECHO MEAS - EF(MOD-SP4): 58.5 %
BH CV ECHO MEAS - EF(TEICH): 38.7 %
BH CV ECHO MEAS - ESV(MOD-SP2): 12 ML
BH CV ECHO MEAS - ESV(MOD-SP4): 17 ML
BH CV ECHO MEAS - ESV(TEICH): 76.5 ML
BH CV ECHO MEAS - FS: 18.9 %
BH CV ECHO MEAS - IVS/LVPW: 1
BH CV ECHO MEAS - IVSD: 0.97 CM
BH CV ECHO MEAS - LAT PEAK E' VEL: 11 CM/SEC
BH CV ECHO MEAS - LV DIASTOLIC VOL/BSA (35-75): 20.2 ML/M^2
BH CV ECHO MEAS - LV MASS(C)D: 180.7 GRAMS
BH CV ECHO MEAS - LV MASS(C)DI: 88.9 GRAMS/M^2
BH CV ECHO MEAS - LV MAX PG: 6.8 MMHG
BH CV ECHO MEAS - LV MEAN PG: 4.2 MMHG
BH CV ECHO MEAS - LV SYSTOLIC VOL/BSA (12-30): 8.4 ML/M^2
BH CV ECHO MEAS - LV V1 MAX: 130.5 CM/SEC
BH CV ECHO MEAS - LV V1 MEAN: 97.1 CM/SEC
BH CV ECHO MEAS - LV V1 VTI: 26.7 CM
BH CV ECHO MEAS - LVIDD: 5.1 CM
BH CV ECHO MEAS - LVIDS: 4.2 CM
BH CV ECHO MEAS - LVLD AP2: 6.1 CM
BH CV ECHO MEAS - LVLD AP4: 5.9 CM
BH CV ECHO MEAS - LVLS AP2: 5.2 CM
BH CV ECHO MEAS - LVLS AP4: 5.6 CM
BH CV ECHO MEAS - LVOT AREA (M): 2.5 CM^2
BH CV ECHO MEAS - LVOT AREA: 2.4 CM^2
BH CV ECHO MEAS - LVOT DIAM: 1.8 CM
BH CV ECHO MEAS - LVPWD: 0.97 CM
BH CV ECHO MEAS - MED PEAK E' VEL: 9 CM/SEC
BH CV ECHO MEAS - MV DEC SLOPE: 536.5 CM/SEC^2
BH CV ECHO MEAS - MV DEC TIME: 0.2 SEC
BH CV ECHO MEAS - MV E MAX VEL: 107 CM/SEC
BH CV ECHO MEAS - MV MAX PG: 8.4 MMHG
BH CV ECHO MEAS - MV MEAN PG: 5.1 MMHG
BH CV ECHO MEAS - MV P1/2T MAX VEL: 106.7 CM/SEC
BH CV ECHO MEAS - MV P1/2T: 58.3 MSEC
BH CV ECHO MEAS - MV V2 MAX: 145.2 CM/SEC
BH CV ECHO MEAS - MV V2 MEAN: 108.8 CM/SEC
BH CV ECHO MEAS - MV V2 VTI: 24.1 CM
BH CV ECHO MEAS - MVA P1/2T LCG: 2.1 CM^2
BH CV ECHO MEAS - MVA(P1/2T): 3.8 CM^2
BH CV ECHO MEAS - MVA(VTI): 2.7 CM^2
BH CV ECHO MEAS - PA MAX PG (FULL): 2.4 MMHG
BH CV ECHO MEAS - PA MAX PG: 5.5 MMHG
BH CV ECHO MEAS - PA V2 MAX: 116.7 CM/SEC
BH CV ECHO MEAS - PVA(V,A): 3 CM^2
BH CV ECHO MEAS - PVA(V,D): 3 CM^2
BH CV ECHO MEAS - QP/QS: 1.2
BH CV ECHO MEAS - RAP SYSTOLE: 8 MMHG
BH CV ECHO MEAS - RV MAX PG: 3 MMHG
BH CV ECHO MEAS - RV MEAN PG: 2.3 MMHG
BH CV ECHO MEAS - RV V1 MAX: 87.3 CM/SEC
BH CV ECHO MEAS - RV V1 MEAN: 74.4 CM/SEC
BH CV ECHO MEAS - RV V1 VTI: 19.9 CM
BH CV ECHO MEAS - RVOT AREA: 4 CM^2
BH CV ECHO MEAS - RVOT DIAM: 2.2 CM
BH CV ECHO MEAS - RVSP: 44 MMHG
BH CV ECHO MEAS - SI(AO): 134.1 ML/M^2
BH CV ECHO MEAS - SI(CUBED): 30.7 ML/M^2
BH CV ECHO MEAS - SI(LVOT): 31.8 ML/M^2
BH CV ECHO MEAS - SI(MOD-SP2): 10.8 ML/M^2
BH CV ECHO MEAS - SI(MOD-SP4): 11.8 ML/M^2
BH CV ECHO MEAS - SI(TEICH): 23.8 ML/M^2
BH CV ECHO MEAS - SUP REN AO DIAM: 2.1 CM
BH CV ECHO MEAS - SV(AO): 272.8 ML
BH CV ECHO MEAS - SV(CUBED): 62.5 ML
BH CV ECHO MEAS - SV(LVOT): 64.7 ML
BH CV ECHO MEAS - SV(MOD-SP2): 22 ML
BH CV ECHO MEAS - SV(MOD-SP4): 24 ML
BH CV ECHO MEAS - SV(RVOT): 78.6 ML
BH CV ECHO MEAS - SV(TEICH): 48.4 ML
BH CV ECHO MEAS - TAPSE (>1.6): 1.6 CM2
BH CV ECHO MEAS - TR MAX VEL: 300 CM/SEC
BH CV ECHO MEASUREMENTS AVERAGE E/E' RATIO: 10.7
BH CV XLRA - RV BASE: 2.4 CM
BH CV XLRA - TDI S': 12 CM/SEC
LEFT ATRIUM VOLUME INDEX: 23 ML/M2
LEFT ATRIUM VOLUME: 45 CM3

## 2018-07-13 PROCEDURE — 71250 CT THORAX DX C-: CPT

## 2018-07-13 PROCEDURE — 93306 TTE W/DOPPLER COMPLETE: CPT

## 2018-07-13 PROCEDURE — 93306 TTE W/DOPPLER COMPLETE: CPT | Performed by: INTERNAL MEDICINE

## 2018-07-16 ENCOUNTER — HOSPITAL ENCOUNTER (OUTPATIENT)
Dept: GENERAL RADIOLOGY | Facility: HOSPITAL | Age: 73
Discharge: HOME | End: 2018-07-16
Attending: INTERNAL MEDICINE | Admitting: INTERNAL MEDICINE

## 2018-07-16 ENCOUNTER — HOSPITAL ENCOUNTER (OUTPATIENT)
Dept: NUCLEAR MEDICINE | Facility: HOSPITAL | Age: 73
Discharge: HOME | End: 2018-07-16

## 2018-07-16 ENCOUNTER — TRANSCRIBE ORDERS (OUTPATIENT)
Dept: ADMINISTRATIVE | Facility: HOSPITAL | Age: 73
End: 2018-07-16

## 2018-07-16 DIAGNOSIS — I26.99 PE (PULMONARY THROMBOEMBOLISM) (HCC): ICD-10-CM

## 2018-07-16 DIAGNOSIS — R79.89 ELEVATED D-DIMER: ICD-10-CM

## 2018-07-16 DIAGNOSIS — C34.91 SMALL CELL CARCINOMA OF RIGHT LUNG (HCC): ICD-10-CM

## 2018-07-16 DIAGNOSIS — I26.99 PE (PULMONARY THROMBOEMBOLISM) (HCC): Primary | ICD-10-CM

## 2018-07-16 PROCEDURE — 71045 X-RAY EXAM CHEST 1 VIEW: CPT

## 2018-07-16 PROCEDURE — 78582 LUNG VENTILAT&PERFUS IMAGING: CPT

## 2018-07-16 PROCEDURE — 0 TECHNETIUM ALBUMIN AGGREGATED: Performed by: INTERNAL MEDICINE

## 2018-07-16 PROCEDURE — A9540 TC99M MAA: HCPCS | Performed by: INTERNAL MEDICINE

## 2018-07-16 PROCEDURE — A9558 XE133 XENON 10MCI: HCPCS | Performed by: INTERNAL MEDICINE

## 2018-07-16 PROCEDURE — 0 XENON XE 133: Performed by: INTERNAL MEDICINE

## 2018-07-16 RX ADMIN — XENON XE-133 13 MILLICURIE: 10 GAS RESPIRATORY (INHALATION) at 12:45

## 2018-07-16 RX ADMIN — Medication 1 DOSE: at 12:55

## 2018-07-19 ENCOUNTER — OFFICE (OUTPATIENT)
Dept: URBAN - METROPOLITAN AREA CLINIC 75 | Facility: CLINIC | Age: 73
End: 2018-07-19
Payer: COMMERCIAL

## 2018-07-19 VITALS
DIASTOLIC BLOOD PRESSURE: 84 MMHG | HEART RATE: 86 BPM | HEIGHT: 66 IN | SYSTOLIC BLOOD PRESSURE: 122 MMHG | WEIGHT: 202 LBS

## 2018-07-19 DIAGNOSIS — K21.9 GASTRO-ESOPHAGEAL REFLUX DISEASE WITHOUT ESOPHAGITIS: ICD-10-CM

## 2018-07-19 DIAGNOSIS — R93.5 ABNORMAL FINDINGS ON DIAGNOSTIC IMAGING OF OTHER ABDOMINAL R: ICD-10-CM

## 2018-07-19 DIAGNOSIS — Z80.9 FAMILY HISTORY OF MALIGNANT NEOPLASM, UNSPECIFIED: ICD-10-CM

## 2018-07-19 PROCEDURE — 99214 OFFICE O/P EST MOD 30 MIN: CPT | Performed by: INTERNAL MEDICINE

## 2018-08-02 ENCOUNTER — OFFICE (OUTPATIENT)
Dept: URBAN - METROPOLITAN AREA CLINIC 75 | Facility: CLINIC | Age: 73
End: 2018-08-02
Payer: COMMERCIAL

## 2018-08-02 VITALS
DIASTOLIC BLOOD PRESSURE: 80 MMHG | HEART RATE: 90 BPM | WEIGHT: 197 LBS | SYSTOLIC BLOOD PRESSURE: 132 MMHG | HEIGHT: 66 IN

## 2018-08-02 DIAGNOSIS — K64.8 OTHER HEMORRHOIDS: ICD-10-CM

## 2018-08-02 DIAGNOSIS — R93.5 ABNORMAL FINDINGS ON DIAGNOSTIC IMAGING OF OTHER ABDOMINAL R: ICD-10-CM

## 2018-08-02 PROCEDURE — 45330 DIAGNOSTIC SIGMOIDOSCOPY: CPT | Performed by: INTERNAL MEDICINE

## 2018-08-08 ENCOUNTER — TELEPHONE (OUTPATIENT)
Dept: CARDIOLOGY | Facility: CLINIC | Age: 73
End: 2018-08-08

## 2018-08-09 NOTE — TELEPHONE ENCOUNTER
I spoke with the pt.  Please call her and make appt for 08-22-18 @ 1 pm.  It's OK to double book.    Thanks,  Kellen

## 2018-08-17 ENCOUNTER — HOSPITAL ENCOUNTER (OUTPATIENT)
Dept: PET IMAGING | Facility: HOSPITAL | Age: 73
Discharge: HOME | End: 2018-08-17
Attending: INTERNAL MEDICINE | Admitting: INTERNAL MEDICINE

## 2018-08-17 DIAGNOSIS — J90 PLEURAL EFFUSION, RIGHT: ICD-10-CM

## 2018-08-17 DIAGNOSIS — C34.91 SMALL CELL CARCINOMA OF RIGHT LUNG (HCC): ICD-10-CM

## 2018-08-17 DIAGNOSIS — R53.82 CHRONIC FATIGUE: ICD-10-CM

## 2018-08-17 DIAGNOSIS — Z85.3 HISTORY OF LEFT BREAST CANCER: ICD-10-CM

## 2018-08-17 DIAGNOSIS — T45.1X5A CHEMOTHERAPY-INDUCED NEUROPATHY (HCC): ICD-10-CM

## 2018-08-17 DIAGNOSIS — G62.0 CHEMOTHERAPY-INDUCED NEUROPATHY (HCC): ICD-10-CM

## 2018-08-17 PROCEDURE — 71250 CT THORAX DX C-: CPT

## 2018-08-22 ENCOUNTER — OFFICE VISIT (OUTPATIENT)
Dept: CARDIOLOGY | Facility: CLINIC | Age: 73
End: 2018-08-22

## 2018-08-22 VITALS
HEIGHT: 66 IN | WEIGHT: 205 LBS | SYSTOLIC BLOOD PRESSURE: 120 MMHG | HEART RATE: 92 BPM | DIASTOLIC BLOOD PRESSURE: 72 MMHG | BODY MASS INDEX: 32.95 KG/M2

## 2018-08-22 DIAGNOSIS — I35.1 NONRHEUMATIC AORTIC VALVE INSUFFICIENCY: Primary | ICD-10-CM

## 2018-08-22 DIAGNOSIS — I48.0 PAROXYSMAL ATRIAL FIBRILLATION (HCC): ICD-10-CM

## 2018-08-22 PROCEDURE — 93000 ELECTROCARDIOGRAM COMPLETE: CPT | Performed by: INTERNAL MEDICINE

## 2018-08-22 PROCEDURE — 99213 OFFICE O/P EST LOW 20 MIN: CPT | Performed by: INTERNAL MEDICINE

## 2018-08-22 RX ORDER — FUROSEMIDE 40 MG/1
40 TABLET ORAL DAILY
COMMUNITY
End: 2019-01-01

## 2018-08-22 RX ORDER — POTASSIUM CHLORIDE 20 MEQ/1
20 TABLET, EXTENDED RELEASE ORAL DAILY
COMMUNITY
End: 2018-12-04 | Stop reason: ALTCHOICE

## 2018-08-24 ENCOUNTER — OFFICE VISIT (OUTPATIENT)
Dept: ONCOLOGY | Facility: CLINIC | Age: 73
End: 2018-08-24

## 2018-08-24 ENCOUNTER — LAB (OUTPATIENT)
Dept: LAB | Facility: HOSPITAL | Age: 73
End: 2018-08-24

## 2018-08-24 VITALS
WEIGHT: 202.8 LBS | RESPIRATION RATE: 18 BRPM | SYSTOLIC BLOOD PRESSURE: 110 MMHG | HEIGHT: 66 IN | BODY MASS INDEX: 32.59 KG/M2 | DIASTOLIC BLOOD PRESSURE: 70 MMHG | TEMPERATURE: 98 F | HEART RATE: 90 BPM | OXYGEN SATURATION: 97 %

## 2018-08-24 DIAGNOSIS — Z85.3 HISTORY OF LEFT BREAST CANCER: ICD-10-CM

## 2018-08-24 DIAGNOSIS — C34.91 SMALL CELL CARCINOMA OF RIGHT LUNG (HCC): Primary | ICD-10-CM

## 2018-08-24 DIAGNOSIS — Z12.31 ENCOUNTER FOR SCREENING MAMMOGRAM FOR MALIGNANT NEOPLASM OF BREAST: ICD-10-CM

## 2018-08-24 DIAGNOSIS — T45.1X5A CHEMOTHERAPY-INDUCED NEUROPATHY (HCC): ICD-10-CM

## 2018-08-24 DIAGNOSIS — C34.91 SMALL CELL CARCINOMA OF RIGHT LUNG (HCC): ICD-10-CM

## 2018-08-24 DIAGNOSIS — R53.82 CHRONIC FATIGUE: ICD-10-CM

## 2018-08-24 DIAGNOSIS — G62.0 CHEMOTHERAPY-INDUCED NEUROPATHY (HCC): ICD-10-CM

## 2018-08-24 DIAGNOSIS — J90 PLEURAL EFFUSION, RIGHT: ICD-10-CM

## 2018-08-24 LAB
ALBUMIN SERPL-MCNC: 4.3 G/DL (ref 3.5–5.2)
ALBUMIN/GLOB SERPL: 1.2 G/DL (ref 1.1–2.4)
ALP SERPL-CCNC: 93 U/L (ref 38–116)
ALT SERPL W P-5'-P-CCNC: 16 U/L (ref 0–33)
ANION GAP SERPL CALCULATED.3IONS-SCNC: 10.9 MMOL/L
AST SERPL-CCNC: 14 U/L (ref 0–32)
BASOPHILS # BLD AUTO: 0.05 10*3/MM3 (ref 0–0.1)
BASOPHILS NFR BLD AUTO: 0.6 % (ref 0–1.1)
BILIRUB SERPL-MCNC: 0.4 MG/DL (ref 0.1–1.2)
BUN BLD-MCNC: 19 MG/DL (ref 6–20)
BUN/CREAT SERPL: 15.2 (ref 7.3–30)
CALCIUM SPEC-SCNC: 10 MG/DL (ref 8.5–10.2)
CHLORIDE SERPL-SCNC: 100 MMOL/L (ref 98–107)
CO2 SERPL-SCNC: 27.1 MMOL/L (ref 22–29)
CREAT BLD-MCNC: 1.25 MG/DL (ref 0.6–1.1)
DEPRECATED RDW RBC AUTO: 48.8 FL (ref 37–49)
EOSINOPHIL # BLD AUTO: 0.14 10*3/MM3 (ref 0–0.36)
EOSINOPHIL NFR BLD AUTO: 1.7 % (ref 1–5)
ERYTHROCYTE [DISTWIDTH] IN BLOOD BY AUTOMATED COUNT: 14.3 % (ref 11.7–14.5)
GFR SERPL CREATININE-BSD FRML MDRD: 42 ML/MIN/1.73
GLOBULIN UR ELPH-MCNC: 3.5 GM/DL (ref 1.8–3.5)
GLUCOSE BLD-MCNC: 99 MG/DL (ref 74–124)
HCT VFR BLD AUTO: 44.6 % (ref 34–45)
HGB BLD-MCNC: 14 G/DL (ref 11.5–14.9)
IMM GRANULOCYTES # BLD: 0.07 10*3/MM3 (ref 0–0.03)
IMM GRANULOCYTES NFR BLD: 0.9 % (ref 0–0.5)
LYMPHOCYTES # BLD AUTO: 1.43 10*3/MM3 (ref 1–3.5)
LYMPHOCYTES NFR BLD AUTO: 17.5 % (ref 20–49)
MCH RBC QN AUTO: 29.6 PG (ref 27–33)
MCHC RBC AUTO-ENTMCNC: 31.4 G/DL (ref 32–35)
MCV RBC AUTO: 94.3 FL (ref 83–97)
MONOCYTES # BLD AUTO: 0.61 10*3/MM3 (ref 0.25–0.8)
MONOCYTES NFR BLD AUTO: 7.4 % (ref 4–12)
NEUTROPHILS # BLD AUTO: 5.89 10*3/MM3 (ref 1.5–7)
NEUTROPHILS NFR BLD AUTO: 71.9 % (ref 39–75)
NRBC BLD MANUAL-RTO: 0 /100 WBC (ref 0–0)
PLATELET # BLD AUTO: 308 10*3/MM3 (ref 150–375)
PMV BLD AUTO: 9.1 FL (ref 8.9–12.1)
POTASSIUM BLD-SCNC: 4.9 MMOL/L (ref 3.5–4.7)
PROT SERPL-MCNC: 7.8 G/DL (ref 6.3–8)
RBC # BLD AUTO: 4.73 10*6/MM3 (ref 3.9–5)
SODIUM BLD-SCNC: 138 MMOL/L (ref 134–145)
WBC NRBC COR # BLD: 8.19 10*3/MM3 (ref 4–10)

## 2018-08-24 PROCEDURE — 80053 COMPREHEN METABOLIC PANEL: CPT | Performed by: INTERNAL MEDICINE

## 2018-08-24 PROCEDURE — 99214 OFFICE O/P EST MOD 30 MIN: CPT | Performed by: INTERNAL MEDICINE

## 2018-08-24 PROCEDURE — 85025 COMPLETE CBC W/AUTO DIFF WBC: CPT | Performed by: INTERNAL MEDICINE

## 2018-08-24 PROCEDURE — 36415 COLL VENOUS BLD VENIPUNCTURE: CPT | Performed by: INTERNAL MEDICINE

## 2018-08-24 RX ORDER — TIOTROPIUM BROMIDE INHALATION SPRAY 3.12 UG/1
SPRAY, METERED RESPIRATORY (INHALATION)
Refills: 6 | COMMUNITY
Start: 2018-07-09 | End: 2018-12-04 | Stop reason: ALTCHOICE

## 2018-08-24 RX ORDER — APIXABAN 5 MG/1
TABLET, FILM COATED ORAL
Refills: 1 | COMMUNITY
Start: 2018-08-07 | End: 2019-01-01 | Stop reason: SDUPTHER

## 2018-08-24 NOTE — PROGRESS NOTES
Subjective .     REASONS FOR FOLLOWUP:    1. History of stage I left breast cancer in 1998, ER/MN positive, status post mastectomy followed by tamoxifen x 5 years, completed in 07/2003.    2. Limited stage small cell lung cancer with right lower lobe mass, right hilar adenopathy.  Initiation of cisplatin/-16 chemotherapy, cycle 1 initiated 11/17/10.  Concurrent radiation therapy initiated 11/19/10 with Dr. Camacho.  Radiation therapy completed 01/11/11.   3. Status post Mediport placement on 12/27/10.  Mediport removed in November 2011.    4. Cycle #6 of cisplatin/-16 chemotherapy initiated 3/1/11.  Complete response seen on subsequent CT scan dated 3/28/11.       5. 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   6. Status post PCI, completed 5-27-11.     7. Right thyroid nodule    HISTORY OF PRESENT ILLNESS:  The patient is a 72 y.o. year old female who is here for follow-up with the above-mentioned history.    History of Present Illness   the patient returns today in follow-up with laboratory studies and CT scan to review.  In the interval she was seen by GI Dr. Watkins and underwent sigmoidoscopy which apparently revealed no abnormalities to explain the fats activity in the sigmoid colon.  She also followed up with Dr. Haynes and was placed on Lasix 40 mg daily and underwent additional testing with a positive d-dimer with subsequent low probability V/Q scan 7/16/18 and further follow-up CT chest which showed no significant change.  In the interval, the patient has not experienced any significant pulmonary symptoms, denies significant shortness of breath and denies any cough.  She does continue with orthostatic symptoms, in fact experiencing an episode of orthostatic hypotension last night getting out of bed with a minor fall to her knees and fortunately no injury.  She is scheduled to see ENT for evaluation next week.  She does note mild hoarseness.  Reports a normal  appetite.    PAST MEDICAL HISTORY:   1. Chronic obstructive pulmonary disease.    2. Right low thoracic dermatome shingles in 02/12.    3. Cystoscopy with bladder biopsy in 05/2012.    4. Psoriasis identified in the right heel in 2013.    5. Osteoarthritis.  6. Colonoscopy 8/4/15 with 5 mm benign rectal polyp.  7. Right sided thyroid nodule.  8. Atrial fibrillation on anticoagulation with Eliquis 5 mg twice a day.  Pacemaker placed 3/13/17.    SURGICAL HISTORY:    1. Left mastectomy in 1998, left breast reconstruction and right breast implant placement in 1999.    2. Exploratory laparotomy.   3. Status post pacemaker placement left chest wall 3/13/17.     ONCOLOGIC HISTORY:  (History from previous dates can be found in the separate document.)  Past Medical History:   Diagnosis Date   • Aortic valve insufficiency    • Atrial fibrillation and flutter (CMS/HCC)    • Axillary lump    • Breast cancer (CMS/HCC)     Left, stage I; ER/LA positive   • Chronic kidney disease     Stage 3   • COPD (chronic obstructive pulmonary disease) (CMS/HCC)    • Dizziness     upon standing   • Fatigue    • Lung cancer (CMS/HCC)    • Neutropenia (CMS/HCC)    • Osteoarthritis    • Paroxysmal atrial fibrillation (CMS/HCC)    • Peripheral neuropathy     Secondary to cisplatin.   • Psoriasis     Identified in the right heel in 2013.   • Rectal polyp    • Shingles 02/2012    RIGHT LOW THORACIC    • Small cell lung cancer (CMS/HCC)     Limited stage   • Thyroid nodule     RIGHT   • Wheezing      Past Surgical History:   Procedure Laterality Date   • BREAST IMPLANT SURGERY Right 1999   • BREAST RECONSTRUCTION Left 1999   • CARDIAC ELECTROPHYSIOLOGY PROCEDURE N/A 3/13/2017    Procedure: Device Implant  DDD pacer  medtronic;  Surgeon: Armani Kennedy MD;  Location: [facility] INVASIVE LOCATION;  Service:    • COLONOSCOPY     • CYSTOSCOPY BLADDER BIOPSY  05/2012   • EXPLORATORY LAPAROTOMY      Several years ago.   • MASTECTOMY Left 1998   •  MEDIPORT INSERTION, SINGLE     • MEDIPORT REMOVAL Right    • PACEMAKER IMPLANTATION           Current Outpatient Prescriptions on File Prior to Visit   Medication Sig Dispense Refill   • Fluticasone-Umeclidin-Vilant (TRELEGY ELLIPTA) 100-62.5-25 MCG/INH aerosol powder  Inhale.     • furosemide (LASIX) 40 MG tablet Take 40 mg by mouth Daily.     • metoprolol tartrate (LOPRESSOR) 25 MG tablet Take 12.5 mg by mouth 2 (Two) Times a Day.     • omeprazole (priLOSEC) 40 MG capsule TK 1 C PO QD  5   • potassium chloride (K-DUR,KLOR-CON) 20 MEQ CR tablet Take 20 mEq by mouth Daily.     • PROAIR  (90 Base) MCG/ACT inhaler INL 2 PFS PO Q 4 H PRN  5     No current facility-administered medications on file prior to visit.        ALLERGIES:     Allergies   Allergen Reactions   • Azithromycin        SOCIAL HISTORY:  .  Drinks approximately three beers every other day.  Long-standing smoking history. No HIV risk factors.  Social History     Social History   • Marital status:      Spouse name: Dirk   • Number of children: N/A   • Years of education: N/A     Occupational History   •  Retired     Social History Main Topics   • Smoking status: Former Smoker     Start date: 2011   • Smokeless tobacco: Never Used      Comment: quit 6 years ago    //    caffeine use - one soft drink daily    • Alcohol use No      Comment: Occasional beer, approximately 3 beers every other day.   • Drug use: No   • Sexual activity: Defer     Other Topics Concern   • Not on file     Social History Narrative   • No narrative on file         FAMILY HISTORY:  Positive for melanoma in her mother, diagnosed at the age of 70 and  from heart attack at age of 80.  Father had colon cancer at age 70 and  at age of 80 of abdominal carcinomatosis.    Family History   Problem Relation Age of Onset   • Heart disease Mother    • Heart attack Mother    • Melanoma Mother 70   • Colon cancer Father 70   • Cancer Father         Abdominal  carcinomatosis   • Stroke Neg Hx        Review of Systems   Constitutional: Positive for fatigue. Negative for activity change, appetite change and unexpected weight change.   HENT: Negative for mouth sores, nosebleeds and voice change.    Respiratory: Negative for shortness of breath and wheezing.    Cardiovascular: Negative for palpitations and leg swelling.   Gastrointestinal: Negative for abdominal distention, blood in stool, constipation and diarrhea.   Endocrine: Negative for cold intolerance and heat intolerance.   Genitourinary: Negative for difficulty urinating, dysuria, frequency and hematuria.   Musculoskeletal: Positive for arthralgias, back pain and gait problem. Negative for neck pain.   Neurological: Positive for dizziness, light-headedness and numbness. Negative for syncope.   Hematological: Negative for adenopathy. Does not bruise/bleed easily.   Psychiatric/Behavioral: Negative for confusion and sleep disturbance. The patient is not nervous/anxious.          Objective      Vitals:    08/24/18 1420   BP: 110/70   Pulse: 90   Resp: 18   Temp: 98 °F (36.7 °C)   SpO2: 97%      Current Status 8/24/2018   ECOG score 0   Pain 10/10    Physical Exam   Constitutional: She is oriented to person, place, and time. She appears well-developed and well-nourished.   HENT:   Mouth/Throat: Oropharynx is clear and moist.   Eyes: Conjunctivae are normal.   Neck: No thyromegaly present.   Cardiovascular: Normal rate and regular rhythm.  Exam reveals no gallop and no friction rub.    No murmur heard.  Pulmonary/Chest: Breath sounds normal. No respiratory distress. Right breast exhibits no mass. Left breast exhibits no mass.   Abdominal: Soft. Bowel sounds are normal. She exhibits no distension. There is no tenderness.   Musculoskeletal: She exhibits no edema.   Lymphadenopathy:        Head (right side): No submandibular adenopathy present.     She has no cervical adenopathy.     She has no axillary adenopathy.         Right: No inguinal and no supraclavicular adenopathy present.        Left: No inguinal and no supraclavicular adenopathy present.   Neurological: She is alert and oriented to person, place, and time. She displays normal reflexes. No cranial nerve deficit. She exhibits normal muscle tone.   Skin: Skin is warm and dry. No rash noted.   Psychiatric: She has a normal mood and affect. Her behavior is normal.       RECENT LABS:  Hematology WBC   Date Value Ref Range Status   08/24/2018 8.19 4.00 - 10.00 10*3/mm3 Final     RBC   Date Value Ref Range Status   08/24/2018 4.73 3.90 - 5.00 10*6/mm3 Final     Hemoglobin   Date Value Ref Range Status   08/24/2018 14.0 11.5 - 14.9 g/dL Final     Hematocrit   Date Value Ref Range Status   08/24/2018 44.6 34.0 - 45.0 % Final     Platelets   Date Value Ref Range Status   08/24/2018 308 150 - 375 10*3/mm3 Final        Lab Results   Component Value Date    GLUCOSE 99 08/24/2018    BUN 19 08/24/2018    CREATININE 1.25 (H) 08/24/2018    EGFRIFNONA 42 (L) 08/24/2018    BCR 15.2 08/24/2018    CO2 27.1 08/24/2018    CALCIUM 10.0 08/24/2018    ALBUMIN 4.30 08/24/2018    AST 14 08/24/2018    ALT 16 08/24/2018         Assessment/Plan     1. Limited stage small cell lung cancer: The patient completed concurrent chemoradiation with 6 cycles of cisplatin and -16 in March 2011 and achieved a complete response. She did receive PCI completing this in May 2011    The patient continued with annual low-dose screening CT scan of the chest.  Follow-up study from 6/6/18 showed some new findings from which the patient was asymptomatic.  There was a small to moderate right pleural effusion as well as right pleural thickening and potential lymphadenopathy along the right lower lobe bronchovascular bundle although difficult to identify with certainty.  There was suspicion for potential underlying malignancy. PET scan 6/15/18 showed no significant change in the right pleural effusion with low level activity  in the right lower lobe bronchovascular bundle SUV of only 3.7.  There was therefore no definitive suspicious hypermetabolic activity to explain the pleural effusion.  Diagnostic right thoracentesis on 6/20/18 with 250 cc clear yellow fluid removed with LDH 90, pH 7.5, total protein 1.8, appearing transudative with pathology showing no evidence of malignant cells. Fortunately there was no significant evidence of malignancy.  Etiology of the findings and pleural effusion unclear however.  Patient referred back to cardiology and was started by PCP on Lasix 40 mg daily in July 2018.  CT chest 7/13/18 with no change and negative/low probability V/Q scan 7/16/18.    The patient returns today in follow-up with repeat CT from 8/17/18.  The CT shows near complete resolution of pleural effusions with only a minimal right pleural effusion remaining area there was some atelectasis/scarring in the right lower lobe and it was recommended follow-up with a repeat study in 3 months.  Patient is asymptomatic from a pulmonary standpoint.  Improvement likely related to diuretics.  Cardiology did not recommend any further evaluation.  Patient will return therefore in 3 months with repeat CT chest just prior to her departure for [place] for the winter.  At this time, there does not appear to be any evidence suggest underlying malignancy.    2. History of stage I breast cancer on the left: The patient underwent a left mastectomy and completed 5 years of tamoxifen in July 2003. Her exam today is negative.  Annual mammogram 8/18/17 was negative, BI-RADS 1.  Her exam was normal on 6/13/18.  She is now due for annual mammogram which we will go ahead and arrange.  3. Peripheral neuropathy secondary to cisplatin: The patient has continued grade 2 peripheral neuropathy involving her feet. This does affect her balance; it is unchanged.   4. Stage III chronic kidney disease: The patient's creatinine is stable at 1.25 today, baseline in the  1.1-1.2 range..   5. Right thyroid nodule: CT scan of the neck from 05/22/2015 in the [facility] did identify a right-sided 1.2 cm thyroid nodule of unclear significance and recommended further evaluation with ultrasound if necessary.  An ultrasound was performed 12/10/15 confirming a 1.4 cm solitary solid nodule in the right thyroid lobe.  A one-year follow-up ultrasound was performed on 12/19/17 showing stability in the right-sided nodule dating back to 2015.  With the length of stability, this is likely a benign nodule.  PET scan was negative in the thyroid on 6/15/18.  6. Atrial fibrillation: The patient does continue on Eliquis.  She required pacemaker placement 3/13/17 after having a syncopal episode.   7. Mild dementia: The patient's short-term memory is very poor.  This has been an ongoing issue for her.  Her scans do show significant progression of small vessel ischemic change.  It is felt that some of this has been precipitated by her previous PCI.  8. Right neck pain: MRI of the cervical spine 12/21/16 showed no evidence of metastatic disease did show degenerative changes and canal narrowing with foraminal narrowing as well, likely the etiology of her pain.  Her pain resolved.  9. Sigmoid colon uptake on PET scan: The PET scan did show activity in the distal sigmoid colon/rectum and the patient was referred back to her gastroenterologist Dr. Colton Watkins.  He did perform a flexible sigmoidoscopy which apparently was negative and likely the activity represents muscular activity in the bowel wall.  Obtain the report.    PLAN:   1. Obtain recent report from flexible sigmoidoscopy from Dr. Watkins  2. Annual right-sided mammogram will be scheduled, due this month  3. M.D. visit in 3 months with CBC, CMP.  One week prior we will obtain CT chest.

## 2018-08-27 ENCOUNTER — TELEPHONE (OUTPATIENT)
Dept: ONCOLOGY | Facility: CLINIC | Age: 73
End: 2018-08-27

## 2018-08-27 NOTE — TELEPHONE ENCOUNTER
----- Message from Cornelio Almanzar Jr., MD sent at 8/24/2018  6:32 PM EDT -----  Please schedule for her annual mammogram that was due in August

## 2018-08-30 NOTE — PROGRESS NOTES
Subjective:     Encounter Date:12/01/2017      Patient ID: Hui Workman is a 72 y.o. female.    Chief Complaint:  Dizziness   This is a recurrent problem. The current episode started more than 1 month ago. The problem occurs intermittently. The problem has been gradually improving. Pertinent negatives include no chest pain.   Atrial Fibrillation   Presents for follow-up visit. Symptoms include dizziness. Symptoms are negative for an AICD problem, bradycardia, chest pain, hypertension, shortness of breath and syncope. The symptoms have been stable. Past medical history includes atrial fibrillation.       72-year-old female who presents today for reevaluation.  Overall patient is starting to improve would continue the same.  Some shortness of breath some lightheadedness/dizziness some fatigue no chest pain edema syncope or near syncope      Review of Systems   Cardiovascular: Negative for chest pain and syncope.   Respiratory: Negative for shortness of breath.    Neurological: Positive for dizziness.   All other systems reviewed and are negative.        ECG 12 Lead  Date/Time: 8/22/2018 10:40 AM  Performed by: FEDE SIMON  Authorized by: FEDE SIMON   Comparison: compared with previous ECG from 2/22/2017  Comparison to previous ECG: A. fib    Rhythm: atrial fibrillation  Clinical impression: abnormal ECG               Objective:     Physical Exam   Constitutional: She is oriented to person, place, and time. She appears well-developed.   HENT:   Head: Normocephalic.   Eyes: Conjunctivae are normal.   Neck: Normal range of motion.   Cardiovascular: Normal rate and normal heart sounds.  An irregularly irregular rhythm present.   Pulmonary/Chest: Breath sounds normal.   Abdominal: Soft. Bowel sounds are normal.   Musculoskeletal: Normal range of motion. She exhibits no edema.   Neurological: She is alert and oriented to person, place, and time.   Skin: Skin is warm and dry.   Psychiatric: She has a  normal mood and affect. Her behavior is normal.   Vitals reviewed.      Lab Review:       Assessment:          Diagnosis Plan   1. Nonrheumatic aortic valve insufficiency     2. Paroxysmal atrial fibrillation (CMS/HCC)            Plan:         1.  Paroxysmal atrial fibrillation with tachybradycardia syndrome.  Overall patient is doing relatively well she is having a little bit of dizziness upon standing.  No chest pain palpitations.  His point I would continue the same.  2.  Hypertension blood pressures good  3.  History of aortic insufficiency clinically stable.  4.  History of small cell lung cancer.  Followed by Dr. Almanzar.  5.  Follow-up 6-12 months sooner if issues      Atrial Fibrillation and Atrial Flutter  Assessment  • The patient has paroxysmal atrial fibrillation  • This is non-valvular in etiology  • The patient's CHADS2-VASc score is 2  • A [id number] score of 2 or more is considered a high risk for a thromboembolic event  • Apixaban prescribed    Plan  • Attempt to maintain sinus rhythm  • Continue apixaban for antithrombotic therapy, bleeding issues discussed  • Continue beta blocker for rhythm control

## 2018-09-24 ENCOUNTER — CLINICAL SUPPORT NO REQUIREMENTS (OUTPATIENT)
Dept: CARDIOLOGY | Facility: CLINIC | Age: 73
End: 2018-09-24

## 2018-09-24 DIAGNOSIS — I49.5 SICK SINUS SYNDROME (HCC): Primary | ICD-10-CM

## 2018-09-24 PROCEDURE — 93296 REM INTERROG EVL PM/IDS: CPT | Performed by: INTERNAL MEDICINE

## 2018-09-24 PROCEDURE — 93294 REM INTERROG EVL PM/LDLS PM: CPT | Performed by: INTERNAL MEDICINE

## 2018-11-30 ENCOUNTER — HOSPITAL ENCOUNTER (OUTPATIENT)
Dept: PET IMAGING | Facility: HOSPITAL | Age: 73
End: 2018-11-30
Attending: INTERNAL MEDICINE

## 2018-11-30 ENCOUNTER — CLINICAL SUPPORT NO REQUIREMENTS (OUTPATIENT)
Dept: CARDIOLOGY | Facility: CLINIC | Age: 73
End: 2018-11-30

## 2018-11-30 ENCOUNTER — HOSPITAL ENCOUNTER (OUTPATIENT)
Dept: CT IMAGING | Facility: HOSPITAL | Age: 73
Discharge: HOME | End: 2018-11-30
Attending: INTERNAL MEDICINE | Admitting: INTERNAL MEDICINE

## 2018-11-30 DIAGNOSIS — C34.91 SMALL CELL CARCINOMA OF RIGHT LUNG (HCC): ICD-10-CM

## 2018-11-30 DIAGNOSIS — Z85.3 HISTORY OF LEFT BREAST CANCER: ICD-10-CM

## 2018-11-30 DIAGNOSIS — I49.5 SICK SINUS SYNDROME (HCC): Primary | ICD-10-CM

## 2018-11-30 PROCEDURE — 71250 CT THORAX DX C-: CPT

## 2018-12-04 ENCOUNTER — OFFICE VISIT (OUTPATIENT)
Dept: CARDIOLOGY | Facility: CLINIC | Age: 73
End: 2018-12-04

## 2018-12-04 ENCOUNTER — CLINICAL SUPPORT NO REQUIREMENTS (OUTPATIENT)
Dept: CARDIOLOGY | Facility: CLINIC | Age: 73
End: 2018-12-04

## 2018-12-04 VITALS
BODY MASS INDEX: 33.17 KG/M2 | HEIGHT: 66 IN | OXYGEN SATURATION: 98 % | HEART RATE: 63 BPM | SYSTOLIC BLOOD PRESSURE: 115 MMHG | DIASTOLIC BLOOD PRESSURE: 70 MMHG | WEIGHT: 206.4 LBS

## 2018-12-04 DIAGNOSIS — I48.0 PAROXYSMAL ATRIAL FIBRILLATION (HCC): Primary | ICD-10-CM

## 2018-12-04 DIAGNOSIS — I35.1 NONRHEUMATIC AORTIC VALVE INSUFFICIENCY: ICD-10-CM

## 2018-12-04 DIAGNOSIS — I49.5 SICK SINUS SYNDROME (HCC): Primary | ICD-10-CM

## 2018-12-04 PROCEDURE — 93280 PM DEVICE PROGR EVAL DUAL: CPT | Performed by: INTERNAL MEDICINE

## 2018-12-04 PROCEDURE — 99214 OFFICE O/P EST MOD 30 MIN: CPT | Performed by: INTERNAL MEDICINE

## 2018-12-04 RX ORDER — POTASSIUM CHLORIDE 20 MEQ/1
20 TABLET, EXTENDED RELEASE ORAL
COMMUNITY

## 2018-12-04 NOTE — PROGRESS NOTES
Subjective:     Encounter Date:12/04/18      Patient ID: Hui Workman is a 73 y.o. female.    Chief Complaint:  Dizziness   This is a recurrent problem. The current episode started more than 1 month ago. The problem occurs intermittently. The problem has been gradually improving. Pertinent negatives include no chest pain.   Atrial Fibrillation   Presents for follow-up visit. Symptoms include dizziness. Symptoms are negative for an AICD problem, bradycardia, chest pain, hypertension, shortness of breath and syncope. The symptoms have been stable. Past medical history includes atrial fibrillation.       72-year-old female who presents today for reevaluation.  Patient continues an intermittent episodes of lightheadedness no swelling palpitations shortness of breath at rest or chest pain.  She is always fatigued.  Her pacemaker was interrogated today and she was in atrial fibrillation 76 days since that seen her last.  She appears to be tolerating it relatively well she is occasionally having some nosebleeds usually it's just a few tissues nothing dramatic.      Review of Systems   Cardiovascular: Negative for chest pain and syncope.   Respiratory: Negative for shortness of breath.    Neurological: Positive for dizziness.   All other systems reviewed and are negative.      Procedures         Objective:     Physical Exam   Constitutional: She is oriented to person, place, and time. She appears well-developed.   HENT:   Head: Normocephalic.   Eyes: Conjunctivae are normal.   Neck: Normal range of motion.   Cardiovascular: Normal rate and normal heart sounds. An irregularly irregular rhythm present.   Pulmonary/Chest: Breath sounds normal.   Abdominal: Soft. Bowel sounds are normal.   Musculoskeletal: Normal range of motion. She exhibits no edema.   Neurological: She is alert and oriented to person, place, and time.   Skin: Skin is warm and dry.   Psychiatric: She has a normal mood and affect. Her behavior is normal.    Vitals reviewed.      Lab Review:       Assessment:          Diagnosis Plan   1. Paroxysmal atrial fibrillation (CMS/HCC)     2. Nonrheumatic aortic valve insufficiency            Plan:         1.  Paroxysmal atrial fibrillation with tachybradycardia syndrome.  She continues to go in and out of atrial fibrillation her pacemaker is still functioning nicely.  2.  Hypertension blood pressures good  3.  History of aortic insufficiency clinically stable.  4.  History of small cell lung cancer.  Followed by Dr. Almanzar.  5.  Follow-up 6-12 months sooner if issues.  Overall she is about the same.      Atrial Fibrillation and Atrial Flutter  Assessment  • The patient has paroxysmal atrial fibrillation  • This is non-valvular in etiology  • The patient's CHADS2-VASc score is 2  • A [id number] score of 2 or more is considered a high risk for a thromboembolic event  • Apixaban prescribed    Plan  • Attempt to maintain sinus rhythm  • Continue apixaban for antithrombotic therapy, bleeding issues discussed  • Continue beta blocker for rhythm control

## 2018-12-07 ENCOUNTER — LAB (OUTPATIENT)
Dept: LAB | Facility: HOSPITAL | Age: 73
End: 2018-12-07

## 2018-12-07 ENCOUNTER — OFFICE VISIT (OUTPATIENT)
Dept: ONCOLOGY | Facility: CLINIC | Age: 73
End: 2018-12-07

## 2018-12-07 VITALS
SYSTOLIC BLOOD PRESSURE: 131 MMHG | OXYGEN SATURATION: 97 % | DIASTOLIC BLOOD PRESSURE: 83 MMHG | TEMPERATURE: 98.5 F | HEIGHT: 65 IN | RESPIRATION RATE: 14 BRPM | BODY MASS INDEX: 34.42 KG/M2 | WEIGHT: 206.6 LBS | HEART RATE: 103 BPM

## 2018-12-07 DIAGNOSIS — C34.91 SMALL CELL CARCINOMA OF RIGHT LUNG (HCC): ICD-10-CM

## 2018-12-07 DIAGNOSIS — Z85.3 HISTORY OF LEFT BREAST CANCER: ICD-10-CM

## 2018-12-07 DIAGNOSIS — T45.1X5A CHEMOTHERAPY-INDUCED NEUROPATHY (HCC): ICD-10-CM

## 2018-12-07 DIAGNOSIS — C34.91 SMALL CELL CARCINOMA OF RIGHT LUNG (HCC): Primary | ICD-10-CM

## 2018-12-07 DIAGNOSIS — G62.0 CHEMOTHERAPY-INDUCED NEUROPATHY (HCC): ICD-10-CM

## 2018-12-07 LAB
ALBUMIN SERPL-MCNC: 4.4 G/DL (ref 3.5–5.2)
ALBUMIN/GLOB SERPL: 1.5 G/DL (ref 1.1–2.4)
ALP SERPL-CCNC: 96 U/L (ref 38–116)
ALT SERPL W P-5'-P-CCNC: 19 U/L (ref 0–33)
ANION GAP SERPL CALCULATED.3IONS-SCNC: 16.4 MMOL/L
AST SERPL-CCNC: 26 U/L (ref 0–32)
BASOPHILS # BLD AUTO: 0.05 10*3/MM3 (ref 0–0.1)
BASOPHILS NFR BLD AUTO: 0.7 % (ref 0–1.1)
BILIRUB SERPL-MCNC: 0.4 MG/DL (ref 0.1–1.2)
BUN BLD-MCNC: 27 MG/DL (ref 6–20)
BUN/CREAT SERPL: 19.6 (ref 7.3–30)
CALCIUM SPEC-SCNC: 9.8 MG/DL (ref 8.5–10.2)
CHLORIDE SERPL-SCNC: 100 MMOL/L (ref 98–107)
CO2 SERPL-SCNC: 22.6 MMOL/L (ref 22–29)
CREAT BLD-MCNC: 1.38 MG/DL (ref 0.6–1.1)
DEPRECATED RDW RBC AUTO: 45.1 FL (ref 37–49)
EOSINOPHIL # BLD AUTO: 0.17 10*3/MM3 (ref 0–0.36)
EOSINOPHIL NFR BLD AUTO: 2.5 % (ref 1–5)
ERYTHROCYTE [DISTWIDTH] IN BLOOD BY AUTOMATED COUNT: 13.2 % (ref 11.7–14.5)
GFR SERPL CREATININE-BSD FRML MDRD: 37 ML/MIN/1.73
GLOBULIN UR ELPH-MCNC: 2.9 GM/DL (ref 1.8–3.5)
GLUCOSE BLD-MCNC: 109 MG/DL (ref 74–124)
HCT VFR BLD AUTO: 39.9 % (ref 34–45)
HGB BLD-MCNC: 13.2 G/DL (ref 11.5–14.9)
IMM GRANULOCYTES # BLD: 0.04 10*3/MM3 (ref 0–0.03)
IMM GRANULOCYTES NFR BLD: 0.6 % (ref 0–0.5)
LYMPHOCYTES # BLD AUTO: 1.15 10*3/MM3 (ref 1–3.5)
LYMPHOCYTES NFR BLD AUTO: 16.7 % (ref 20–49)
MCH RBC QN AUTO: 30.8 PG (ref 27–33)
MCHC RBC AUTO-ENTMCNC: 33.1 G/DL (ref 32–35)
MCV RBC AUTO: 93 FL (ref 83–97)
MONOCYTES # BLD AUTO: 0.51 10*3/MM3 (ref 0.25–0.8)
MONOCYTES NFR BLD AUTO: 7.4 % (ref 4–12)
NEUTROPHILS # BLD AUTO: 4.98 10*3/MM3 (ref 1.5–7)
NEUTROPHILS NFR BLD AUTO: 72.1 % (ref 39–75)
NRBC BLD MANUAL-RTO: 0 /100 WBC (ref 0–0)
PLATELET # BLD AUTO: 258 10*3/MM3 (ref 150–375)
PMV BLD AUTO: 9 FL (ref 8.9–12.1)
POTASSIUM BLD-SCNC: 5.1 MMOL/L (ref 3.5–4.7)
PROT SERPL-MCNC: 7.3 G/DL (ref 6.3–8)
RBC # BLD AUTO: 4.29 10*6/MM3 (ref 3.9–5)
SODIUM BLD-SCNC: 139 MMOL/L (ref 134–145)
WBC NRBC COR # BLD: 6.9 10*3/MM3 (ref 4–10)

## 2018-12-07 PROCEDURE — 85025 COMPLETE CBC W/AUTO DIFF WBC: CPT | Performed by: INTERNAL MEDICINE

## 2018-12-07 PROCEDURE — 36415 COLL VENOUS BLD VENIPUNCTURE: CPT | Performed by: INTERNAL MEDICINE

## 2018-12-07 PROCEDURE — 99214 OFFICE O/P EST MOD 30 MIN: CPT | Performed by: INTERNAL MEDICINE

## 2018-12-07 PROCEDURE — 80053 COMPREHEN METABOLIC PANEL: CPT | Performed by: INTERNAL MEDICINE

## 2018-12-07 NOTE — PROGRESS NOTES
Subjective .     REASONS FOR FOLLOWUP:    1. History of stage I left breast cancer in 1998, ER/MT positive, status post mastectomy followed by tamoxifen x 5 years, completed in 07/2003.    2. Limited stage small cell lung cancer with right lower lobe mass, right hilar adenopathy.  Initiation of cisplatin/-16 chemotherapy, cycle 1 initiated 11/17/10.  Concurrent radiation therapy initiated 11/19/10.  Radiation therapy completed 01/11/11.   3. Status post Mediport placement on 12/27/10.  Mediport removed in November 2011.    4. Cycle #6 of cisplatin/-16 chemotherapy initiated 3/1/11.  Complete response seen on subsequent CT scan dated 3/28/11.       5. 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   6. Status post PCI, completed 5-27-11.     7. Right thyroid nodule    HISTORY OF PRESENT ILLNESS:  The patient is a 73 y.o. year old female who is here for follow-up with the above-mentioned history.    History of Present Illness   the patient returns today in follow-up with laboratory studies and CT scan to review.  In the interval she has continued to do quite well.  She does note some minor episodes of epistaxis.  She has had some pruritus involving her eyes.  She has undergone some injections in her back with pain management which have led to some improvement in her chronic back pain.  She has some mild chronic dyspnea on exertion, unchanged.  She has had some alternating constipation and diarrhea which is chronic.    PAST MEDICAL HISTORY:   1. Chronic obstructive pulmonary disease.    2. Right low thoracic dermatome shingles in 02/12.    3. Cystoscopy with bladder biopsy in 05/2012.    4. Psoriasis identified in the right heel in 2013.    5. Osteoarthritis.  6. Colonoscopy 8/4/15 with 5 mm benign rectal polyp.  7. Right sided thyroid nodule.  8. Atrial fibrillation on anticoagulation with Eliquis 5 mg twice a day.  Pacemaker placed 3/13/17.    SURGICAL HISTORY:    1. Left mastectomy in  1998, left breast reconstruction and right breast implant placement in 1999.    2. Exploratory laparotomy.   3. Status post pacemaker placement left chest wall 3/13/17.     ONCOLOGIC HISTORY:  (History from previous dates can be found in the separate document.)  Past Medical History:   Diagnosis Date   • Aortic valve insufficiency    • Atrial fibrillation and flutter (CMS/HCC)    • Axillary lump    • Breast cancer (CMS/HCC)     Left, stage I; ER/AR positive   • Chronic kidney disease     Stage 3   • COPD (chronic obstructive pulmonary disease) (CMS/HCC)    • Dizziness     upon standing   • Fatigue    • Lung cancer (CMS/HCC)    • Neutropenia (CMS/HCC)    • Osteoarthritis    • Paroxysmal atrial fibrillation (CMS/HCC)    • Peripheral neuropathy     Secondary to cisplatin.   • Psoriasis     Identified in the right heel in 2013.   • Rectal polyp    • Shingles 02/2012    RIGHT LOW THORACIC    • Small cell lung cancer (CMS/HCC)     Limited stage   • Thyroid nodule     RIGHT   • Wheezing      Past Surgical History:   Procedure Laterality Date   • BREAST IMPLANT SURGERY Right 1999   • BREAST RECONSTRUCTION Left 1999   • CATARACT EXTRACTION Bilateral    • COLONOSCOPY     • CYSTOSCOPY BLADDER BIOPSY  05/2012   • EXPLORATORY LAPAROTOMY      Several years ago.   • MASTECTOMY Left 1998   • MEDIPORT INSERTION, SINGLE     • MEDIPORT REMOVAL Right    • PACEMAKER IMPLANTATION           Current Outpatient Medications on File Prior to Visit   Medication Sig Dispense Refill   • ELIQUIS 5 MG tablet tablet TK 1 T PO BID  1   • Fluticasone-Umeclidin-Vilant (TRELEGY ELLIPTA) 100-62.5-25 MCG/INH aerosol powder  Inhale.     • furosemide (LASIX) 40 MG tablet Take 40 mg by mouth Daily.     • metoprolol tartrate (LOPRESSOR) 25 MG tablet Take 12.5 mg by mouth 2 (Two) Times a Day.     • omeprazole (priLOSEC) 40 MG capsule TK 1 C PO QD  5   • potassium chloride (K-DUR,KLOR-CON) 20 MEQ CR tablet Take 20 mEq by mouth Daily.       No current  facility-administered medications on file prior to visit.        ALLERGIES:     Allergies   Allergen Reactions   • Azithromycin        SOCIAL HISTORY:  .  Drinks approximately three beers every other day.  Long-standing smoking history. No HIV risk factors.  Social History     Socioeconomic History   • Marital status:      Spouse name: Dirk   • Number of children: Not on file   • Years of education: Not on file   • Highest education level: Not on file   Social Needs   • Financial resource strain: Not on file   • Food insecurity - worry: Not on file   • Food insecurity - inability: Not on file   • Transportation needs - medical: Not on file   • Transportation needs - non-medical: Not on file   Occupational History     Employer: RETIRED   Tobacco Use   • Smoking status: Former Smoker     Start date: 2011   • Smokeless tobacco: Never Used   • Tobacco comment: quit 6 years ago    //    caffeine use - one soft drink daily    Substance and Sexual Activity   • Alcohol use: No     Comment: Occasional beer, approximately 3 beers every other day.   • Drug use: No   • Sexual activity: Defer   Other Topics Concern   • Not on file   Social History Narrative   • Not on file         FAMILY HISTORY:  Positive for melanoma in her mother, diagnosed at the age of 70 and  from heart attack at age of 80.  Father had colon cancer at age 70 and  at age of 80 of abdominal carcinomatosis.    Family History   Problem Relation Age of Onset   • Heart disease Mother    • Heart attack Mother    • Melanoma Mother 70   • Colon cancer Father 70   • Cancer Father         Abdominal carcinomatosis   • Stroke Neg Hx        Review of Systems   Constitutional: Positive for fatigue. Negative for activity change, appetite change and unexpected weight change.   HENT: Positive for nosebleeds. Negative for mouth sores and voice change.    Eyes: Positive for itching.   Respiratory: Negative for shortness of breath and wheezing.     Cardiovascular: Negative for palpitations and leg swelling.   Gastrointestinal: Positive for constipation and diarrhea. Negative for abdominal distention and blood in stool.   Endocrine: Negative for cold intolerance and heat intolerance.   Genitourinary: Negative for difficulty urinating, dysuria, frequency and hematuria.   Musculoskeletal: Positive for arthralgias, back pain and gait problem. Negative for neck pain.   Neurological: Positive for dizziness, light-headedness and numbness. Negative for syncope.   Hematological: Negative for adenopathy. Does not bruise/bleed easily.   Psychiatric/Behavioral: Negative for confusion and sleep disturbance. The patient is not nervous/anxious.          Objective      Vitals:    12/07/18 1312   BP: 131/83   Pulse: 103   Resp: 14   Temp: 98.5 °F (36.9 °C)   SpO2: 97%      Current Status 12/7/2018   ECOG score 0   Pain 0/10    Physical Exam   Constitutional: She is oriented to person, place, and time. She appears well-developed and well-nourished.   HENT:   Mouth/Throat: Oropharynx is clear and moist.   Eyes: Conjunctivae are normal.   Neck: No thyromegaly present.   Cardiovascular: Normal rate and regular rhythm. Exam reveals no gallop and no friction rub.   No murmur heard.  Pulmonary/Chest: No respiratory distress. She has wheezes. Right breast exhibits no mass. Left breast exhibits no mass.   A few scattered wheezes in the upper lung fields   Abdominal: Soft. Bowel sounds are normal. She exhibits no distension. There is no tenderness.   Musculoskeletal: She exhibits no edema.   Lymphadenopathy:        Head (right side): No submandibular adenopathy present.     She has no cervical adenopathy.     She has no axillary adenopathy.        Right: No inguinal and no supraclavicular adenopathy present.        Left: No inguinal and no supraclavicular adenopathy present.   Neurological: She is alert and oriented to person, place, and time. She displays normal reflexes. No cranial  nerve deficit. She exhibits normal muscle tone.   Skin: Skin is warm and dry. No rash noted.   Psychiatric: She has a normal mood and affect. Her behavior is normal.       RECENT LABS:  Hematology WBC   Date Value Ref Range Status   12/07/2018 6.90 4.00 - 10.00 10*3/mm3 Final     RBC   Date Value Ref Range Status   12/07/2018 4.29 3.90 - 5.00 10*6/mm3 Final     Hemoglobin   Date Value Ref Range Status   12/07/2018 13.2 11.5 - 14.9 g/dL Final     Hematocrit   Date Value Ref Range Status   12/07/2018 39.9 34.0 - 45.0 % Final     Platelets   Date Value Ref Range Status   12/07/2018 258 150 - 375 10*3/mm3 Final        Lab Results   Component Value Date    GLUCOSE 99 08/24/2018    BUN 19 08/24/2018    CREATININE 1.25 (H) 08/24/2018    EGFRIFNONA 42 (L) 08/24/2018    BCR 15.2 08/24/2018    CO2 27.1 08/24/2018    CALCIUM 10.0 08/24/2018    ALBUMIN 4.30 08/24/2018    AST 14 08/24/2018    ALT 16 08/24/2018     CT chest 11/30/18: I did personally review CT images today  IMPRESSION:  1. Bandlike area of probable scarring in the right lower lung.  2. No significant pleural effusions are seen.  3. Tiny noncalcified nodule in the right lung base as described. This  may have been present on previous studies but slightly better seen at  this time.      Right mammogram 9/5/18, BI-RADS 1    Assessment/Plan     1. Limited stage small cell lung cancer: The patient completed concurrent chemoradiation with 6 cycles of cisplatin and -16 in March 2011 and achieved a complete response. She did receive PCI completing this in May 2011    The patient continued with annual low-dose screening CT scan of the chest.  Follow-up study from 6/6/18 showed some new findings from which the patient was asymptomatic.  There was a small to moderate right pleural effusion as well as right pleural thickening and potential lymphadenopathy along the right lower lobe bronchovascular bundle although difficult to identify with certainty.  There was suspicion  for potential underlying malignancy. PET scan 6/15/18 showed no significant change in the right pleural effusion with low level activity in the right lower lobe bronchovascular bundle SUV of only 3.7.  There was therefore no definitive suspicious hypermetabolic activity to explain the pleural effusion.  Diagnostic right thoracentesis on 6/20/18 with 250 cc clear yellow fluid removed with LDH 90, pH 7.5, total protein 1.8, appearing transudative with pathology showing no evidence of malignant cells. Fortunately there was no significant evidence of malignancy.  Etiology of the findings and pleural effusion unclear however.  Patient referred back to cardiology and was started by PCP on Lasix 40 mg daily in July 2018.  CT chest 7/13/18 with no change and negative/low probability V/Q scan 7/16/18.    Repeat CT 8/17/18 showed near complete resolution of pleural effusions with only a minimal right pleural effusion remaining and some atelectasis/scarring in the right lower lobe. Recommended follow-up with a repeat study in 3 months.      The patient returns today in follow-up with repeat CT to review from 11/30/18.  This shows no change from the prior study with probable scar in the right lower lobe.  No significant pleural effusions were identified.  There was a small noncalcified 6 mm nodule in the right lower lobe which was felt to have been present on prior studies however was slightly more prominent on the current study and recommended further follow-up.  We discussed lengthening this out to 6 months to correspond with her return from [place].    2. History of stage I breast cancer on the left: The patient underwent a left mastectomy and completed 5 years of tamoxifen in July 2003. Her exam today is negative.  Annual right mammogram 9/5/18 was negative, BI-RADS 1.  Her exam was normal on 6/13/18.  Plan to repeat breast exam at return visit.  3. Peripheral neuropathy secondary to cisplatin: The patient has continued  grade 2 peripheral neuropathy involving her feet. This does affect her balance; it is unchanged.   4. Stage III chronic kidney disease: The patient's creatinine is stable at 1.38 today, baseline in the 1.1-1.2 range..   5. Right thyroid nodule: CT scan of the neck from 05/22/2015 in the [facility] system did identify a right-sided 1.2 cm thyroid nodule of unclear significance and recommended further evaluation with ultrasound if necessary.  An ultrasound was performed 12/10/15 confirming a 1.4 cm solitary solid nodule in the right thyroid lobe.  A one-year follow-up ultrasound was performed on 12/19/17 showing stability in the right-sided nodule dating back to 2015.  With the length of stability, this is likely a benign nodule.  PET scan was negative in the thyroid on 6/15/18.  6. Atrial fibrillation: The patient does continue on Eliquis.  She required pacemaker placement 3/13/17 after having a syncopal episode.   7. Mild dementia: The patient's short-term memory is very poor.  This has been an ongoing issue for her.  Her scans do show significant progression of small vessel ischemic change.  It is felt that some of this has been precipitated by her previous PCI.  8. Right neck pain: MRI of the cervical spine 12/21/16 showed no evidence of metastatic disease did show degenerative changes and canal narrowing with foraminal narrowing as well, likely the etiology of her pain.  Her pain resolved.  9. Sigmoid colon uptake on PET scan: The PET scan did show activity in the distal sigmoid colon/rectum and the patient was referred back to her gastroenterologist Dr. Colton Watkins.  He did perform a flexible sigmoidoscopy which was negative on 8/2/18 and likely the activity represents muscular activity in the bowel wall.     PLAN:     1. M.D. visit in 6 months with CBC, CMP.  One week prior we will obtain CT chest.

## 2019-01-01 ENCOUNTER — APPOINTMENT (OUTPATIENT)
Dept: CT IMAGING | Facility: HOSPITAL | Age: 74
End: 2019-01-01

## 2019-01-01 ENCOUNTER — HOSPITAL ENCOUNTER (INPATIENT)
Facility: HOSPITAL | Age: 74
LOS: 5 days | Discharge: HOME | End: 2019-05-27
Attending: EMERGENCY MEDICINE | Admitting: HOSPITALIST

## 2019-01-01 ENCOUNTER — INFUSION (OUTPATIENT)
Dept: ONCOLOGY | Facility: HOSPITAL | Age: 74
End: 2019-01-01

## 2019-01-01 ENCOUNTER — TELEPHONE (OUTPATIENT)
Dept: ONCOLOGY | Facility: HOSPITAL | Age: 74
End: 2019-01-01

## 2019-01-01 ENCOUNTER — HOSPITAL ENCOUNTER (OUTPATIENT)
Dept: PET IMAGING | Facility: HOSPITAL | Age: 74
Discharge: HOME | End: 2019-12-27
Admitting: INTERNAL MEDICINE

## 2019-01-01 ENCOUNTER — OFFICE VISIT (OUTPATIENT)
Dept: ONCOLOGY | Facility: CLINIC | Age: 74
End: 2019-01-01

## 2019-01-01 ENCOUNTER — HOSPITAL ENCOUNTER (OUTPATIENT)
Dept: GENERAL RADIOLOGY | Facility: HOSPITAL | Age: 74
Discharge: HOME | End: 2019-08-01

## 2019-01-01 ENCOUNTER — TELEPHONE (OUTPATIENT)
Dept: INTERVENTIONAL RADIOLOGY/VASCULAR | Facility: HOSPITAL | Age: 74
End: 2019-01-01

## 2019-01-01 ENCOUNTER — APPOINTMENT (OUTPATIENT)
Dept: RADIATION ONCOLOGY | Facility: HOSPITAL | Age: 74
End: 2019-01-01

## 2019-01-01 ENCOUNTER — HOSPITAL ENCOUNTER (OUTPATIENT)
Dept: PET IMAGING | Facility: HOSPITAL | Age: 74
Discharge: HOME | End: 2019-07-24
Admitting: INTERNAL MEDICINE

## 2019-01-01 ENCOUNTER — HOSPITAL ENCOUNTER (OUTPATIENT)
Dept: GENERAL RADIOLOGY | Facility: HOSPITAL | Age: 74
Discharge: HOME | End: 2019-09-20

## 2019-01-01 ENCOUNTER — APPOINTMENT (OUTPATIENT)
Dept: LAB | Facility: HOSPITAL | Age: 74
End: 2019-01-01

## 2019-01-01 ENCOUNTER — LAB (OUTPATIENT)
Dept: LAB | Facility: HOSPITAL | Age: 74
End: 2019-01-01

## 2019-01-01 ENCOUNTER — HOSPITAL ENCOUNTER (OUTPATIENT)
Facility: HOSPITAL | Age: 74
Setting detail: HOSPITAL OUTPATIENT SURGERY
Discharge: HOME | End: 2019-07-22
Attending: INTERNAL MEDICINE | Admitting: INTERNAL MEDICINE

## 2019-01-01 ENCOUNTER — ANESTHESIA EVENT (OUTPATIENT)
Dept: GASTROENTEROLOGY | Facility: HOSPITAL | Age: 74
End: 2019-01-01

## 2019-01-01 ENCOUNTER — TELEPHONE (OUTPATIENT)
Dept: ONCOLOGY | Facility: CLINIC | Age: 74
End: 2019-01-01

## 2019-01-01 ENCOUNTER — READMISSION MANAGEMENT (OUTPATIENT)
Dept: CALL CENTER | Facility: HOSPITAL | Age: 74
End: 2019-01-01

## 2019-01-01 ENCOUNTER — LAB (OUTPATIENT)
Dept: ONCOLOGY | Facility: HOSPITAL | Age: 74
End: 2019-01-01

## 2019-01-01 ENCOUNTER — TRANSCRIBE ORDERS (OUTPATIENT)
Dept: ADMINISTRATIVE | Facility: HOSPITAL | Age: 74
End: 2019-01-01

## 2019-01-01 ENCOUNTER — RADIATION ONCOLOGY WEEKLY ASSESSMENT (OUTPATIENT)
Dept: RADIATION ONCOLOGY | Facility: HOSPITAL | Age: 74
End: 2019-01-01

## 2019-01-01 ENCOUNTER — CLINICAL SUPPORT NO REQUIREMENTS (OUTPATIENT)
Dept: CARDIOLOGY | Facility: CLINIC | Age: 74
End: 2019-01-01

## 2019-01-01 ENCOUNTER — HOSPITAL ENCOUNTER (OUTPATIENT)
Dept: GENERAL RADIOLOGY | Facility: HOSPITAL | Age: 74
Discharge: HOME | End: 2019-09-20
Admitting: INTERNAL MEDICINE

## 2019-01-01 ENCOUNTER — HOSPITAL ENCOUNTER (OUTPATIENT)
Dept: PET IMAGING | Facility: HOSPITAL | Age: 74
Discharge: HOME | End: 2019-10-28

## 2019-01-01 ENCOUNTER — HOSPITAL ENCOUNTER (OUTPATIENT)
Dept: CT IMAGING | Facility: HOSPITAL | Age: 74
Discharge: HOME | End: 2019-08-14
Admitting: RADIOLOGY

## 2019-01-01 ENCOUNTER — HOSPITAL ENCOUNTER (OUTPATIENT)
Dept: PET IMAGING | Facility: HOSPITAL | Age: 74
Discharge: HOME | End: 2019-07-11
Admitting: INTERNAL MEDICINE

## 2019-01-01 ENCOUNTER — APPOINTMENT (OUTPATIENT)
Dept: GENERAL RADIOLOGY | Facility: HOSPITAL | Age: 74
End: 2019-01-01

## 2019-01-01 ENCOUNTER — TELEPHONE (OUTPATIENT)
Dept: RADIATION ONCOLOGY | Facility: HOSPITAL | Age: 74
End: 2019-01-01

## 2019-01-01 ENCOUNTER — HOSPITAL ENCOUNTER (OUTPATIENT)
Dept: PET IMAGING | Facility: HOSPITAL | Age: 74
End: 2019-01-01
Attending: INTERNAL MEDICINE

## 2019-01-01 ENCOUNTER — HOSPITAL ENCOUNTER (OUTPATIENT)
Dept: PET IMAGING | Facility: HOSPITAL | Age: 74
Discharge: HOME | End: 2019-12-27

## 2019-01-01 ENCOUNTER — HOSPITAL ENCOUNTER (OUTPATIENT)
Dept: CT IMAGING | Facility: HOSPITAL | Age: 74
Discharge: HOME | End: 2019-08-01
Admitting: INTERNAL MEDICINE

## 2019-01-01 ENCOUNTER — CONSULT (OUTPATIENT)
Dept: RADIATION ONCOLOGY | Facility: CLINIC | Age: 74
End: 2019-01-01

## 2019-01-01 ENCOUNTER — APPOINTMENT (OUTPATIENT)
Dept: PET IMAGING | Facility: HOSPITAL | Age: 74
End: 2019-01-01

## 2019-01-01 ENCOUNTER — ANESTHESIA (OUTPATIENT)
Dept: GASTROENTEROLOGY | Facility: HOSPITAL | Age: 74
End: 2019-01-01

## 2019-01-01 ENCOUNTER — OFFICE VISIT (OUTPATIENT)
Dept: CARDIOLOGY | Facility: CLINIC | Age: 74
End: 2019-01-01

## 2019-01-01 ENCOUNTER — HOSPITAL ENCOUNTER (OUTPATIENT)
Dept: PET IMAGING | Facility: HOSPITAL | Age: 74
Discharge: HOME | End: 2019-07-11

## 2019-01-01 ENCOUNTER — HOSPITAL ENCOUNTER (OUTPATIENT)
Dept: PET IMAGING | Facility: HOSPITAL | Age: 74
Discharge: HOME | End: 2019-10-28
Admitting: INTERNAL MEDICINE

## 2019-01-01 ENCOUNTER — TELEPHONE (OUTPATIENT)
Dept: CARDIOLOGY | Facility: CLINIC | Age: 74
End: 2019-01-01

## 2019-01-01 ENCOUNTER — HOSPITAL ENCOUNTER (EMERGENCY)
Facility: HOSPITAL | Age: 74
Discharge: HOME | End: 2019-10-04
Attending: EMERGENCY MEDICINE | Admitting: EMERGENCY MEDICINE

## 2019-01-01 ENCOUNTER — DOCUMENTATION (OUTPATIENT)
Dept: RADIATION ONCOLOGY | Facility: HOSPITAL | Age: 74
End: 2019-01-01

## 2019-01-01 VITALS
WEIGHT: 190.9 LBS | TEMPERATURE: 98.3 F | DIASTOLIC BLOOD PRESSURE: 70 MMHG | RESPIRATION RATE: 16 BRPM | HEART RATE: 93 BPM | BODY MASS INDEX: 31.81 KG/M2 | OXYGEN SATURATION: 93 % | SYSTOLIC BLOOD PRESSURE: 110 MMHG | HEIGHT: 65 IN

## 2019-01-01 VITALS
DIASTOLIC BLOOD PRESSURE: 63 MMHG | BODY MASS INDEX: 32.32 KG/M2 | SYSTOLIC BLOOD PRESSURE: 111 MMHG | TEMPERATURE: 98.4 F | WEIGHT: 194 LBS | HEART RATE: 97 BPM | HEIGHT: 65 IN | OXYGEN SATURATION: 96 % | RESPIRATION RATE: 16 BRPM

## 2019-01-01 VITALS
TEMPERATURE: 97.7 F | RESPIRATION RATE: 14 BRPM | HEIGHT: 65 IN | DIASTOLIC BLOOD PRESSURE: 73 MMHG | SYSTOLIC BLOOD PRESSURE: 112 MMHG | OXYGEN SATURATION: 95 % | HEART RATE: 91 BPM | BODY MASS INDEX: 33.22 KG/M2 | WEIGHT: 199.4 LBS

## 2019-01-01 VITALS
HEIGHT: 65 IN | SYSTOLIC BLOOD PRESSURE: 111 MMHG | RESPIRATION RATE: 16 BRPM | HEART RATE: 77 BPM | TEMPERATURE: 98.1 F | WEIGHT: 190.7 LBS | OXYGEN SATURATION: 96 % | BODY MASS INDEX: 31.77 KG/M2 | DIASTOLIC BLOOD PRESSURE: 74 MMHG

## 2019-01-01 VITALS
HEIGHT: 66 IN | OXYGEN SATURATION: 96 % | RESPIRATION RATE: 16 BRPM | DIASTOLIC BLOOD PRESSURE: 61 MMHG | TEMPERATURE: 97.1 F | SYSTOLIC BLOOD PRESSURE: 110 MMHG | WEIGHT: 195 LBS | BODY MASS INDEX: 31.34 KG/M2 | HEART RATE: 106 BPM

## 2019-01-01 VITALS
OXYGEN SATURATION: 98 % | WEIGHT: 188.05 LBS | BODY MASS INDEX: 31.33 KG/M2 | HEART RATE: 94 BPM | DIASTOLIC BLOOD PRESSURE: 95 MMHG | RESPIRATION RATE: 20 BRPM | SYSTOLIC BLOOD PRESSURE: 116 MMHG | TEMPERATURE: 97.5 F | HEIGHT: 65 IN

## 2019-01-01 VITALS
BODY MASS INDEX: 30.99 KG/M2 | HEIGHT: 65 IN | TEMPERATURE: 98.3 F | DIASTOLIC BLOOD PRESSURE: 56 MMHG | RESPIRATION RATE: 16 BRPM | HEART RATE: 91 BPM | WEIGHT: 186 LBS | SYSTOLIC BLOOD PRESSURE: 93 MMHG | OXYGEN SATURATION: 94 %

## 2019-01-01 VITALS
HEART RATE: 88 BPM | WEIGHT: 191.6 LBS | BODY MASS INDEX: 31.92 KG/M2 | SYSTOLIC BLOOD PRESSURE: 95 MMHG | TEMPERATURE: 97.8 F | OXYGEN SATURATION: 91 % | RESPIRATION RATE: 14 BRPM | HEIGHT: 65 IN | DIASTOLIC BLOOD PRESSURE: 64 MMHG

## 2019-01-01 VITALS
OXYGEN SATURATION: 91 % | TEMPERATURE: 98.4 F | HEART RATE: 87 BPM | DIASTOLIC BLOOD PRESSURE: 77 MMHG | SYSTOLIC BLOOD PRESSURE: 110 MMHG | RESPIRATION RATE: 20 BRPM

## 2019-01-01 VITALS
TEMPERATURE: 97.6 F | HEART RATE: 84 BPM | HEIGHT: 65 IN | DIASTOLIC BLOOD PRESSURE: 74 MMHG | RESPIRATION RATE: 16 BRPM | OXYGEN SATURATION: 93 % | SYSTOLIC BLOOD PRESSURE: 113 MMHG | WEIGHT: 194.4 LBS | BODY MASS INDEX: 32.39 KG/M2

## 2019-01-01 VITALS
DIASTOLIC BLOOD PRESSURE: 66 MMHG | BODY MASS INDEX: 31.05 KG/M2 | WEIGHT: 186.6 LBS | TEMPERATURE: 97.5 F | SYSTOLIC BLOOD PRESSURE: 98 MMHG | OXYGEN SATURATION: 93 % | RESPIRATION RATE: 16 BRPM | HEART RATE: 89 BPM

## 2019-01-01 VITALS
DIASTOLIC BLOOD PRESSURE: 65 MMHG | RESPIRATION RATE: 16 BRPM | WEIGHT: 185 LBS | BODY MASS INDEX: 30.82 KG/M2 | HEIGHT: 65 IN | OXYGEN SATURATION: 94 % | TEMPERATURE: 97.5 F | SYSTOLIC BLOOD PRESSURE: 103 MMHG | HEART RATE: 63 BPM

## 2019-01-01 VITALS
HEIGHT: 65 IN | RESPIRATION RATE: 16 BRPM | HEART RATE: 110 BPM | SYSTOLIC BLOOD PRESSURE: 106 MMHG | WEIGHT: 185.4 LBS | DIASTOLIC BLOOD PRESSURE: 71 MMHG | OXYGEN SATURATION: 95 % | BODY MASS INDEX: 30.89 KG/M2 | TEMPERATURE: 98 F

## 2019-01-01 VITALS
HEART RATE: 90 BPM | SYSTOLIC BLOOD PRESSURE: 114 MMHG | BODY MASS INDEX: 32.59 KG/M2 | OXYGEN SATURATION: 96 % | WEIGHT: 195.6 LBS | DIASTOLIC BLOOD PRESSURE: 76 MMHG | HEIGHT: 65 IN

## 2019-01-01 VITALS
RESPIRATION RATE: 17 BRPM | HEIGHT: 66 IN | HEART RATE: 83 BPM | SYSTOLIC BLOOD PRESSURE: 118 MMHG | TEMPERATURE: 97.9 F | WEIGHT: 194.4 LBS | DIASTOLIC BLOOD PRESSURE: 72 MMHG | OXYGEN SATURATION: 100 % | BODY MASS INDEX: 31.24 KG/M2

## 2019-01-01 VITALS
BODY MASS INDEX: 32.49 KG/M2 | OXYGEN SATURATION: 92 % | DIASTOLIC BLOOD PRESSURE: 54 MMHG | HEIGHT: 65 IN | HEART RATE: 82 BPM | RESPIRATION RATE: 18 BRPM | SYSTOLIC BLOOD PRESSURE: 100 MMHG | TEMPERATURE: 98.5 F | WEIGHT: 195 LBS

## 2019-01-01 VITALS
DIASTOLIC BLOOD PRESSURE: 67 MMHG | BODY MASS INDEX: 32.07 KG/M2 | RESPIRATION RATE: 14 BRPM | SYSTOLIC BLOOD PRESSURE: 94 MMHG | HEIGHT: 65 IN | TEMPERATURE: 97.8 F | WEIGHT: 192.5 LBS | HEART RATE: 98 BPM | OXYGEN SATURATION: 94 %

## 2019-01-01 VITALS — OXYGEN SATURATION: 92 % | HEART RATE: 83 BPM | DIASTOLIC BLOOD PRESSURE: 71 MMHG | SYSTOLIC BLOOD PRESSURE: 105 MMHG

## 2019-01-01 VITALS — HEART RATE: 82 BPM | OXYGEN SATURATION: 99 %

## 2019-01-01 VITALS — SYSTOLIC BLOOD PRESSURE: 110 MMHG | HEART RATE: 87 BPM | OXYGEN SATURATION: 97 % | DIASTOLIC BLOOD PRESSURE: 70 MMHG

## 2019-01-01 DIAGNOSIS — J18.9 PNEUMONIA DUE TO INFECTIOUS ORGANISM, UNSPECIFIED LATERALITY, UNSPECIFIED PART OF LUNG: Primary | ICD-10-CM

## 2019-01-01 DIAGNOSIS — Z79.899 HIGH RISK MEDICATION USE: ICD-10-CM

## 2019-01-01 DIAGNOSIS — C34.92 NON-SMALL CELL CANCER OF LEFT LUNG (HCC): Primary | ICD-10-CM

## 2019-01-01 DIAGNOSIS — C34.92 NON-SMALL CELL CANCER OF LEFT LUNG (HCC): ICD-10-CM

## 2019-01-01 DIAGNOSIS — C34.12 MALIGNANT NEOPLASM OF UPPER LOBE, LEFT BRONCHUS OR LUNG (HCC): ICD-10-CM

## 2019-01-01 DIAGNOSIS — R91.1 NODULE OF UPPER LOBE OF LEFT LUNG: ICD-10-CM

## 2019-01-01 DIAGNOSIS — C79.51 BONE METASTASIS: ICD-10-CM

## 2019-01-01 DIAGNOSIS — C34.91 SMALL CELL CARCINOMA OF RIGHT LUNG (HCC): Primary | ICD-10-CM

## 2019-01-01 DIAGNOSIS — Z85.3 HISTORY OF LEFT BREAST CANCER: ICD-10-CM

## 2019-01-01 DIAGNOSIS — Z79.899 HIGH RISK MEDICATION USE: Primary | ICD-10-CM

## 2019-01-01 DIAGNOSIS — M89.9 BONE LESION: Primary | ICD-10-CM

## 2019-01-01 DIAGNOSIS — C34.91 SMALL CELL CARCINOMA OF RIGHT LUNG (HCC): ICD-10-CM

## 2019-01-01 DIAGNOSIS — R91.1 NODULE OF UPPER LOBE OF LEFT LUNG: Primary | ICD-10-CM

## 2019-01-01 DIAGNOSIS — Z74.09 IMPAIRED FUNCTIONAL MOBILITY, BALANCE, GAIT, AND ENDURANCE: ICD-10-CM

## 2019-01-01 DIAGNOSIS — C79.51 BONE METASTASIS: Primary | ICD-10-CM

## 2019-01-01 DIAGNOSIS — R04.0 EPISTAXIS: ICD-10-CM

## 2019-01-01 DIAGNOSIS — Z85.118 HISTORY OF LUNG CANCER: ICD-10-CM

## 2019-01-01 DIAGNOSIS — G62.0 CHEMOTHERAPY-INDUCED NEUROPATHY (HCC): ICD-10-CM

## 2019-01-01 DIAGNOSIS — R26.89 DECREASED MOBILITY: ICD-10-CM

## 2019-01-01 DIAGNOSIS — I49.5 SICK SINUS SYNDROME (HCC): Primary | ICD-10-CM

## 2019-01-01 DIAGNOSIS — C7A.1 MALIGNANT POORLY DIFFERENTIATED NEUROENDOCRINE TUMORS (HCC): ICD-10-CM

## 2019-01-01 DIAGNOSIS — Z85.118 HISTORY OF LUNG CANCER: Primary | ICD-10-CM

## 2019-01-01 DIAGNOSIS — C41.4 MALIGNANT NEOPLASM OF PELVIC BONES, SACRUM AND COCCYX (HCC): ICD-10-CM

## 2019-01-01 DIAGNOSIS — M89.9 BONE LESION: ICD-10-CM

## 2019-01-01 DIAGNOSIS — E87.6 HYPOKALEMIA: ICD-10-CM

## 2019-01-01 DIAGNOSIS — T45.1X5A CHEMOTHERAPY-INDUCED NEUROPATHY (HCC): ICD-10-CM

## 2019-01-01 DIAGNOSIS — B34.8 INFECTION DUE TO PARAINFLUENZA VIRUS 3: Primary | ICD-10-CM

## 2019-01-01 DIAGNOSIS — I35.1 NONRHEUMATIC AORTIC VALVE INSUFFICIENCY: ICD-10-CM

## 2019-01-01 DIAGNOSIS — R91.1 PULMONARY NODULE: ICD-10-CM

## 2019-01-01 DIAGNOSIS — I48.0 PAROXYSMAL ATRIAL FIBRILLATION (HCC): Primary | ICD-10-CM

## 2019-01-01 DIAGNOSIS — E87.1 HYPONATREMIA: ICD-10-CM

## 2019-01-01 DIAGNOSIS — B34.8 INFECTION DUE TO HUMAN METAPNEUMOVIRUS (HMPV): ICD-10-CM

## 2019-01-01 DIAGNOSIS — C34.90 PRIMARY MALIGNANT NEOPLASM OF LUNG METASTATIC TO OTHER SITE, UNSPECIFIED LATERALITY (HCC): ICD-10-CM

## 2019-01-01 DIAGNOSIS — J44.1 COPD WITH ACUTE EXACERBATION (HCC): ICD-10-CM

## 2019-01-01 LAB
ALBUMIN SERPL-MCNC: 3.5 G/DL (ref 3.5–5.2)
ALBUMIN SERPL-MCNC: 3.8 G/DL (ref 3.5–5.2)
ALBUMIN SERPL-MCNC: 4 G/DL (ref 3.5–5.2)
ALBUMIN SERPL-MCNC: 4.1 G/DL (ref 3.5–5.2)
ALBUMIN SERPL-MCNC: 4.1 G/DL (ref 3.5–5.2)
ALBUMIN SERPL-MCNC: 4.2 G/DL (ref 3.5–5.2)
ALBUMIN SERPL-MCNC: 4.3 G/DL (ref 3.5–5.2)
ALBUMIN SERPL-MCNC: 4.3 G/DL (ref 3.5–5.2)
ALBUMIN/GLOB SERPL: 1 G/DL
ALBUMIN/GLOB SERPL: 1.1 G/DL
ALBUMIN/GLOB SERPL: 1.1 G/DL (ref 1.1–2.4)
ALBUMIN/GLOB SERPL: 1.2 G/DL
ALBUMIN/GLOB SERPL: 1.2 G/DL (ref 1.1–2.4)
ALBUMIN/GLOB SERPL: 1.2 G/DL (ref 1.1–2.4)
ALBUMIN/GLOB SERPL: 1.3 G/DL (ref 1.1–2.4)
ALBUMIN/GLOB SERPL: 1.3 G/DL (ref 1.1–2.4)
ALP SERPL-CCNC: 107 U/L (ref 38–116)
ALP SERPL-CCNC: 108 U/L (ref 38–116)
ALP SERPL-CCNC: 116 U/L (ref 38–116)
ALP SERPL-CCNC: 117 U/L (ref 39–117)
ALP SERPL-CCNC: 76 U/L (ref 39–117)
ALP SERPL-CCNC: 86 U/L (ref 39–117)
ALP SERPL-CCNC: 88 U/L (ref 38–116)
ALP SERPL-CCNC: 92 U/L (ref 38–116)
ALP SERPL-CCNC: 93 U/L (ref 38–116)
ALP SERPL-CCNC: 95 U/L (ref 38–116)
ALP SERPL-CCNC: 96 U/L (ref 38–116)
ALP SERPL-CCNC: 98 U/L (ref 38–116)
ALP SERPL-CCNC: 99 U/L (ref 38–116)
ALT SERPL W P-5'-P-CCNC: 10 U/L (ref 0–33)
ALT SERPL W P-5'-P-CCNC: 10 U/L (ref 0–33)
ALT SERPL W P-5'-P-CCNC: 14 U/L (ref 0–33)
ALT SERPL W P-5'-P-CCNC: 15 U/L (ref 0–33)
ALT SERPL W P-5'-P-CCNC: 19 U/L (ref 1–33)
ALT SERPL W P-5'-P-CCNC: 25 U/L (ref 1–33)
ALT SERPL W P-5'-P-CCNC: 6 U/L (ref 0–33)
ALT SERPL W P-5'-P-CCNC: 7 U/L (ref 0–33)
ALT SERPL W P-5'-P-CCNC: 8 U/L (ref 0–33)
ALT SERPL W P-5'-P-CCNC: 8 U/L (ref 1–33)
ALT SERPL W P-5'-P-CCNC: 9 U/L (ref 0–33)
ANION GAP SERPL CALCULATED.3IONS-SCNC: 11.2 MMOL/L
ANION GAP SERPL CALCULATED.3IONS-SCNC: 12.3 MMOL/L
ANION GAP SERPL CALCULATED.3IONS-SCNC: 12.7 MMOL/L (ref 5–15)
ANION GAP SERPL CALCULATED.3IONS-SCNC: 13.3 MMOL/L (ref 5–15)
ANION GAP SERPL CALCULATED.3IONS-SCNC: 13.7 MMOL/L
ANION GAP SERPL CALCULATED.3IONS-SCNC: 13.9 MMOL/L
ANION GAP SERPL CALCULATED.3IONS-SCNC: 13.9 MMOL/L (ref 5–15)
ANION GAP SERPL CALCULATED.3IONS-SCNC: 14.1 MMOL/L (ref 5–15)
ANION GAP SERPL CALCULATED.3IONS-SCNC: 14.9 MMOL/L (ref 5–15)
ANION GAP SERPL CALCULATED.3IONS-SCNC: 15.1 MMOL/L (ref 5–15)
ANION GAP SERPL CALCULATED.3IONS-SCNC: 15.4 MMOL/L (ref 5–15)
ANION GAP SERPL CALCULATED.3IONS-SCNC: 15.6 MMOL/L (ref 5–15)
ANION GAP SERPL CALCULATED.3IONS-SCNC: 15.8 MMOL/L (ref 5–15)
ANION GAP SERPL CALCULATED.3IONS-SCNC: 15.9 MMOL/L (ref 5–15)
ANION GAP SERPL CALCULATED.3IONS-SCNC: 16.1 MMOL/L (ref 5–15)
ANION GAP SERPL CALCULATED.3IONS-SCNC: 17.8 MMOL/L
ANION GAP SERPL CALCULATED.3IONS-SCNC: 18.9 MMOL/L
ARTERIAL PATENCY WRIST A: POSITIVE
AST SERPL-CCNC: 10 U/L (ref 0–32)
AST SERPL-CCNC: 10 U/L (ref 0–32)
AST SERPL-CCNC: 11 U/L (ref 0–32)
AST SERPL-CCNC: 12 U/L (ref 0–32)
AST SERPL-CCNC: 12 U/L (ref 0–32)
AST SERPL-CCNC: 13 U/L (ref 0–32)
AST SERPL-CCNC: 14 U/L (ref 0–32)
AST SERPL-CCNC: 18 U/L (ref 1–32)
AST SERPL-CCNC: 25 U/L (ref 1–32)
AST SERPL-CCNC: 9 U/L (ref 0–32)
AST SERPL-CCNC: 9 U/L (ref 1–32)
ATMOSPHERIC PRESS: 755.2 MMHG
B PARAPERT DNA SPEC QL NAA+PROBE: NOT DETECTED
B PARAPERT DNA SPEC QL NAA+PROBE: NOT DETECTED
B PERT DNA SPEC QL NAA+PROBE: NOT DETECTED
B PERT DNA SPEC QL NAA+PROBE: NOT DETECTED
BACTERIA SPEC AEROBE CULT: NORMAL
BACTERIA SPEC AEROBE CULT: NORMAL
BASE EXCESS BLDA CALC-SCNC: 0.7 MMOL/L (ref 0–2)
BASOPHILS # BLD AUTO: 0.02 10*3/MM3 (ref 0–0.2)
BASOPHILS # BLD AUTO: 0.03 10*3/MM3 (ref 0–0.2)
BASOPHILS # BLD AUTO: 0.04 10*3/MM3 (ref 0–0.2)
BASOPHILS # BLD AUTO: 0.05 10*3/MM3 (ref 0–0.2)
BASOPHILS # BLD AUTO: 0.06 10*3/MM3 (ref 0–0.2)
BASOPHILS NFR BLD AUTO: 0.3 % (ref 0–1.5)
BASOPHILS NFR BLD AUTO: 0.4 % (ref 0–1.5)
BASOPHILS NFR BLD AUTO: 0.4 % (ref 0–1.5)
BASOPHILS NFR BLD AUTO: 0.5 % (ref 0–1.5)
BASOPHILS NFR BLD AUTO: 0.6 % (ref 0–1.5)
BDY SITE: ABNORMAL
BILIRUB SERPL-MCNC: 0.3 MG/DL (ref 0.2–1.2)
BILIRUB SERPL-MCNC: 0.3 MG/DL (ref 0.2–1.2)
BILIRUB SERPL-MCNC: 0.4 MG/DL (ref 0.2–1.2)
BILIRUB SERPL-MCNC: 0.5 MG/DL (ref 0.2–1.2)
BILIRUB SERPL-MCNC: 0.6 MG/DL (ref 0.2–1.2)
BILIRUB UR QL STRIP: NEGATIVE
BUN BLD-MCNC: 17 MG/DL (ref 8–23)
BUN BLD-MCNC: 18 MG/DL (ref 6–20)
BUN BLD-MCNC: 18 MG/DL (ref 8–23)
BUN BLD-MCNC: 19 MG/DL (ref 8–23)
BUN BLD-MCNC: 19 MG/DL (ref 8–23)
BUN BLD-MCNC: 20 MG/DL (ref 6–20)
BUN BLD-MCNC: 21 MG/DL (ref 6–20)
BUN BLD-MCNC: 21 MG/DL (ref 6–20)
BUN BLD-MCNC: 21 MG/DL (ref 8–23)
BUN BLD-MCNC: 21 MG/DL (ref 8–23)
BUN BLD-MCNC: 22 MG/DL (ref 6–20)
BUN BLD-MCNC: 22 MG/DL (ref 8–23)
BUN BLD-MCNC: 23 MG/DL (ref 6–20)
BUN BLD-MCNC: 24 MG/DL (ref 6–20)
BUN BLD-MCNC: 28 MG/DL (ref 6–20)
BUN/CREAT SERPL: 15.4 (ref 7–25)
BUN/CREAT SERPL: 15.5 (ref 7.3–30)
BUN/CREAT SERPL: 15.6 (ref 7–25)
BUN/CREAT SERPL: 16 (ref 7–25)
BUN/CREAT SERPL: 16.7 (ref 7.3–30)
BUN/CREAT SERPL: 16.8 (ref 7.3–30)
BUN/CREAT SERPL: 17.5 (ref 7.3–30)
BUN/CREAT SERPL: 17.5 (ref 7.3–30)
BUN/CREAT SERPL: 17.6 (ref 7.3–30)
BUN/CREAT SERPL: 17.8 (ref 7–25)
BUN/CREAT SERPL: 18.3 (ref 7.3–30)
BUN/CREAT SERPL: 18.3 (ref 7.3–30)
BUN/CREAT SERPL: 18.4 (ref 7.3–30)
BUN/CREAT SERPL: 18.6 (ref 7–25)
BUN/CREAT SERPL: 19.1 (ref 7–25)
BUN/CREAT SERPL: 19.1 (ref 7–25)
BUN/CREAT SERPL: 21.9 (ref 7.3–30)
C PNEUM DNA NPH QL NAA+NON-PROBE: NOT DETECTED
C PNEUM DNA NPH QL NAA+NON-PROBE: NOT DETECTED
CALCIUM SPEC-SCNC: 10 MG/DL (ref 8.5–10.2)
CALCIUM SPEC-SCNC: 10 MG/DL (ref 8.5–10.2)
CALCIUM SPEC-SCNC: 10.1 MG/DL (ref 8.5–10.2)
CALCIUM SPEC-SCNC: 8.2 MG/DL (ref 8.5–10.2)
CALCIUM SPEC-SCNC: 8.7 MG/DL (ref 8.6–10.5)
CALCIUM SPEC-SCNC: 9 MG/DL (ref 8.6–10.5)
CALCIUM SPEC-SCNC: 9.1 MG/DL (ref 8.6–10.5)
CALCIUM SPEC-SCNC: 9.2 MG/DL (ref 8.5–10.2)
CALCIUM SPEC-SCNC: 9.4 MG/DL (ref 8.6–10.5)
CALCIUM SPEC-SCNC: 9.5 MG/DL (ref 8.5–10.2)
CALCIUM SPEC-SCNC: 9.5 MG/DL (ref 8.6–10.5)
CALCIUM SPEC-SCNC: 9.7 MG/DL (ref 8.5–10.2)
CALCIUM SPEC-SCNC: 9.8 MG/DL (ref 8.5–10.2)
CHLORIDE SERPL-SCNC: 102 MMOL/L (ref 98–107)
CHLORIDE SERPL-SCNC: 103 MMOL/L (ref 98–107)
CHLORIDE SERPL-SCNC: 93 MMOL/L (ref 98–107)
CHLORIDE SERPL-SCNC: 94 MMOL/L (ref 98–107)
CHLORIDE SERPL-SCNC: 95 MMOL/L (ref 98–107)
CHLORIDE SERPL-SCNC: 96 MMOL/L (ref 98–107)
CHLORIDE SERPL-SCNC: 96 MMOL/L (ref 98–107)
CHLORIDE SERPL-SCNC: 97 MMOL/L (ref 98–107)
CHLORIDE SERPL-SCNC: 98 MMOL/L (ref 98–107)
CHOLEST SERPL-MCNC: 137 MG/DL (ref 0–200)
CLARITY UR: CLEAR
CO2 SERPL-SCNC: 21.1 MMOL/L (ref 22–29)
CO2 SERPL-SCNC: 22.1 MMOL/L (ref 22–29)
CO2 SERPL-SCNC: 22.1 MMOL/L (ref 22–29)
CO2 SERPL-SCNC: 23.7 MMOL/L (ref 22–29)
CO2 SERPL-SCNC: 23.8 MMOL/L (ref 22–29)
CO2 SERPL-SCNC: 23.9 MMOL/L (ref 22–29)
CO2 SERPL-SCNC: 24.2 MMOL/L (ref 22–29)
CO2 SERPL-SCNC: 24.9 MMOL/L (ref 22–29)
CO2 SERPL-SCNC: 24.9 MMOL/L (ref 22–29)
CO2 SERPL-SCNC: 25.1 MMOL/L (ref 22–29)
CO2 SERPL-SCNC: 25.2 MMOL/L (ref 22–29)
CO2 SERPL-SCNC: 25.3 MMOL/L (ref 22–29)
CO2 SERPL-SCNC: 25.4 MMOL/L (ref 22–29)
CO2 SERPL-SCNC: 25.6 MMOL/L (ref 22–29)
CO2 SERPL-SCNC: 25.7 MMOL/L (ref 22–29)
CO2 SERPL-SCNC: 27.3 MMOL/L (ref 22–29)
CO2 SERPL-SCNC: 28.1 MMOL/L (ref 22–29)
COLOR UR: YELLOW
CREAT BLD-MCNC: 0.94 MG/DL (ref 0.57–1)
CREAT BLD-MCNC: 1.02 MG/DL (ref 0.57–1)
CREAT BLD-MCNC: 1.06 MG/DL (ref 0.57–1)
CREAT BLD-MCNC: 1.07 MG/DL (ref 0.57–1)
CREAT BLD-MCNC: 1.09 MG/DL (ref 0.6–1.1)
CREAT BLD-MCNC: 1.09 MG/DL (ref 0.6–1.1)
CREAT BLD-MCNC: 1.1 MG/DL (ref 0.57–1)
CREAT BLD-MCNC: 1.14 MG/DL (ref 0.6–1.1)
CREAT BLD-MCNC: 1.16 MG/DL (ref 0.6–1.1)
CREAT BLD-MCNC: 1.19 MG/DL (ref 0.6–1.1)
CREAT BLD-MCNC: 1.25 MG/DL (ref 0.6–1.1)
CREAT BLD-MCNC: 1.25 MG/DL (ref 0.6–1.1)
CREAT BLD-MCNC: 1.28 MG/DL (ref 0.6–1.1)
CREAT BLD-MCNC: 1.32 MG/DL (ref 0.6–1.1)
CREAT BLD-MCNC: 1.35 MG/DL (ref 0.57–1)
CREAT BLD-MCNC: 1.37 MG/DL (ref 0.6–1.1)
CREAT BLD-MCNC: 1.43 MG/DL (ref 0.57–1)
CYTO UR: NORMAL
D-LACTATE SERPL-SCNC: 2.6 MMOL/L (ref 0.5–2)
D-LACTATE SERPL-SCNC: 3.3 MMOL/L (ref 0.5–2)
DEPRECATED RDW RBC AUTO: 46 FL (ref 37–54)
DEPRECATED RDW RBC AUTO: 49.1 FL (ref 37–54)
DEPRECATED RDW RBC AUTO: 49.2 FL (ref 37–54)
DEPRECATED RDW RBC AUTO: 50 FL (ref 37–54)
DEPRECATED RDW RBC AUTO: 50 FL (ref 37–54)
DEPRECATED RDW RBC AUTO: 50.4 FL (ref 37–54)
DEPRECATED RDW RBC AUTO: 50.4 FL (ref 37–54)
DEPRECATED RDW RBC AUTO: 50.6 FL (ref 37–54)
DEPRECATED RDW RBC AUTO: 51.2 FL (ref 37–54)
DEPRECATED RDW RBC AUTO: 51.2 FL (ref 37–54)
DEPRECATED RDW RBC AUTO: 52.1 FL (ref 37–54)
DEPRECATED RDW RBC AUTO: 52.3 FL (ref 37–54)
DEPRECATED RDW RBC AUTO: 52.3 FL (ref 37–54)
DEPRECATED RDW RBC AUTO: 52.7 FL (ref 37–54)
DEPRECATED RDW RBC AUTO: 57.4 FL (ref 37–54)
EOSINOPHIL # BLD AUTO: 0.01 10*3/MM3 (ref 0–0.4)
EOSINOPHIL # BLD AUTO: 0.05 10*3/MM3 (ref 0–0.4)
EOSINOPHIL # BLD AUTO: 0.1 10*3/MM3 (ref 0–0.4)
EOSINOPHIL # BLD AUTO: 0.11 10*3/MM3 (ref 0–0.4)
EOSINOPHIL # BLD AUTO: 0.14 10*3/MM3 (ref 0–0.4)
EOSINOPHIL # BLD AUTO: 0.16 10*3/MM3 (ref 0–0.4)
EOSINOPHIL # BLD AUTO: 0.17 10*3/MM3 (ref 0–0.4)
EOSINOPHIL # BLD AUTO: 0.19 10*3/MM3 (ref 0–0.4)
EOSINOPHIL # BLD AUTO: 0.19 10*3/MM3 (ref 0–0.4)
EOSINOPHIL # BLD AUTO: 0.21 10*3/MM3 (ref 0–0.4)
EOSINOPHIL # BLD AUTO: 0.24 10*3/MM3 (ref 0–0.4)
EOSINOPHIL # BLD AUTO: 0.28 10*3/MM3 (ref 0–0.4)
EOSINOPHIL NFR BLD AUTO: 0.1 % (ref 0.3–6.2)
EOSINOPHIL NFR BLD AUTO: 0.4 % (ref 0.3–6.2)
EOSINOPHIL NFR BLD AUTO: 1.3 % (ref 0.3–6.2)
EOSINOPHIL NFR BLD AUTO: 1.5 % (ref 0.3–6.2)
EOSINOPHIL NFR BLD AUTO: 1.8 % (ref 0.3–6.2)
EOSINOPHIL NFR BLD AUTO: 1.9 % (ref 0.3–6.2)
EOSINOPHIL NFR BLD AUTO: 2 % (ref 0.3–6.2)
EOSINOPHIL NFR BLD AUTO: 2.1 % (ref 0.3–6.2)
EOSINOPHIL NFR BLD AUTO: 2.4 % (ref 0.3–6.2)
EOSINOPHIL NFR BLD AUTO: 2.5 % (ref 0.3–6.2)
EOSINOPHIL NFR BLD AUTO: 2.6 % (ref 0.3–6.2)
EOSINOPHIL NFR BLD AUTO: 2.7 % (ref 0.3–6.2)
EOSINOPHIL NFR BLD AUTO: 3.2 % (ref 0.3–6.2)
EOSINOPHIL NFR BLD AUTO: 3.3 % (ref 0.3–6.2)
EOSINOPHIL NFR BLD AUTO: 3.6 % (ref 0.3–6.2)
ERYTHROCYTE [DISTWIDTH] IN BLOOD BY AUTOMATED COUNT: 13.5 % (ref 12.3–15.4)
ERYTHROCYTE [DISTWIDTH] IN BLOOD BY AUTOMATED COUNT: 13.8 % (ref 12.3–15.4)
ERYTHROCYTE [DISTWIDTH] IN BLOOD BY AUTOMATED COUNT: 14 % (ref 12.3–15.4)
ERYTHROCYTE [DISTWIDTH] IN BLOOD BY AUTOMATED COUNT: 14 % (ref 12.3–15.4)
ERYTHROCYTE [DISTWIDTH] IN BLOOD BY AUTOMATED COUNT: 14.1 % (ref 12.3–15.4)
ERYTHROCYTE [DISTWIDTH] IN BLOOD BY AUTOMATED COUNT: 14.3 % (ref 12.3–15.4)
ERYTHROCYTE [DISTWIDTH] IN BLOOD BY AUTOMATED COUNT: 14.4 % (ref 12.3–15.4)
ERYTHROCYTE [DISTWIDTH] IN BLOOD BY AUTOMATED COUNT: 14.4 % (ref 12.3–15.4)
ERYTHROCYTE [DISTWIDTH] IN BLOOD BY AUTOMATED COUNT: 14.6 % (ref 12.3–15.4)
ERYTHROCYTE [DISTWIDTH] IN BLOOD BY AUTOMATED COUNT: 15.1 % (ref 12.3–15.4)
ERYTHROCYTE [DISTWIDTH] IN BLOOD BY AUTOMATED COUNT: 15.3 % (ref 12.3–15.4)
ERYTHROCYTE [DISTWIDTH] IN BLOOD BY AUTOMATED COUNT: 15.5 % (ref 12.3–15.4)
ERYTHROCYTE [DISTWIDTH] IN BLOOD BY AUTOMATED COUNT: 15.8 % (ref 12.3–15.4)
FLUAV H1 2009 PAND RNA NPH QL NAA+PROBE: NOT DETECTED
FLUAV H1 2009 PAND RNA NPH QL NAA+PROBE: NOT DETECTED
FLUAV H1 HA GENE NPH QL NAA+PROBE: NOT DETECTED
FLUAV H1 HA GENE NPH QL NAA+PROBE: NOT DETECTED
FLUAV H3 RNA NPH QL NAA+PROBE: NOT DETECTED
FLUAV H3 RNA NPH QL NAA+PROBE: NOT DETECTED
FLUAV SUBTYP SPEC NAA+PROBE: NOT DETECTED
FLUAV SUBTYP SPEC NAA+PROBE: NOT DETECTED
FLUBV RNA ISLT QL NAA+PROBE: NOT DETECTED
FLUBV RNA ISLT QL NAA+PROBE: NOT DETECTED
GFR SERPL CREATININE-BSD FRML MDRD: 36 ML/MIN/1.73
GFR SERPL CREATININE-BSD FRML MDRD: 38 ML/MIN/1.73
GFR SERPL CREATININE-BSD FRML MDRD: 38 ML/MIN/1.73
GFR SERPL CREATININE-BSD FRML MDRD: 39 ML/MIN/1.73
GFR SERPL CREATININE-BSD FRML MDRD: 41 ML/MIN/1.73
GFR SERPL CREATININE-BSD FRML MDRD: 42 ML/MIN/1.73
GFR SERPL CREATININE-BSD FRML MDRD: 42 ML/MIN/1.73
GFR SERPL CREATININE-BSD FRML MDRD: 44 ML/MIN/1.73
GFR SERPL CREATININE-BSD FRML MDRD: 46 ML/MIN/1.73
GFR SERPL CREATININE-BSD FRML MDRD: 46 ML/MIN/1.73
GFR SERPL CREATININE-BSD FRML MDRD: 48 ML/MIN/1.73
GFR SERPL CREATININE-BSD FRML MDRD: 49 ML/MIN/1.73
GFR SERPL CREATININE-BSD FRML MDRD: 49 ML/MIN/1.73
GFR SERPL CREATININE-BSD FRML MDRD: 50 ML/MIN/1.73
GFR SERPL CREATININE-BSD FRML MDRD: 51 ML/MIN/1.73
GFR SERPL CREATININE-BSD FRML MDRD: 53 ML/MIN/1.73
GFR SERPL CREATININE-BSD FRML MDRD: 58 ML/MIN/1.73
GLOBULIN UR ELPH-MCNC: 3.2 GM/DL (ref 1.8–3.5)
GLOBULIN UR ELPH-MCNC: 3.2 GM/DL (ref 1.8–3.5)
GLOBULIN UR ELPH-MCNC: 3.4 GM/DL
GLOBULIN UR ELPH-MCNC: 3.5 GM/DL
GLOBULIN UR ELPH-MCNC: 3.5 GM/DL
GLOBULIN UR ELPH-MCNC: 3.5 GM/DL (ref 1.8–3.5)
GLOBULIN UR ELPH-MCNC: 3.6 GM/DL (ref 1.8–3.5)
GLOBULIN UR ELPH-MCNC: 3.7 GM/DL (ref 1.8–3.5)
GLOBULIN UR ELPH-MCNC: 3.8 GM/DL (ref 1.8–3.5)
GLUCOSE BLD-MCNC: 104 MG/DL (ref 74–124)
GLUCOSE BLD-MCNC: 109 MG/DL (ref 65–99)
GLUCOSE BLD-MCNC: 109 MG/DL (ref 65–99)
GLUCOSE BLD-MCNC: 112 MG/DL (ref 74–124)
GLUCOSE BLD-MCNC: 117 MG/DL (ref 65–99)
GLUCOSE BLD-MCNC: 117 MG/DL (ref 74–124)
GLUCOSE BLD-MCNC: 119 MG/DL (ref 65–99)
GLUCOSE BLD-MCNC: 125 MG/DL (ref 65–99)
GLUCOSE BLD-MCNC: 130 MG/DL (ref 74–124)
GLUCOSE BLD-MCNC: 132 MG/DL (ref 74–124)
GLUCOSE BLD-MCNC: 132 MG/DL (ref 74–124)
GLUCOSE BLD-MCNC: 134 MG/DL (ref 74–124)
GLUCOSE BLD-MCNC: 137 MG/DL (ref 74–124)
GLUCOSE BLD-MCNC: 140 MG/DL (ref 74–124)
GLUCOSE BLD-MCNC: 162 MG/DL (ref 65–99)
GLUCOSE BLD-MCNC: 171 MG/DL (ref 74–124)
GLUCOSE BLD-MCNC: 201 MG/DL (ref 65–99)
GLUCOSE BLDC GLUCOMTR-MCNC: 103 MG/DL (ref 70–130)
GLUCOSE BLDC GLUCOMTR-MCNC: 123 MG/DL (ref 70–130)
GLUCOSE BLDC GLUCOMTR-MCNC: 127 MG/DL (ref 70–130)
GLUCOSE BLDC GLUCOMTR-MCNC: 97 MG/DL (ref 70–130)
GLUCOSE UR STRIP-MCNC: NEGATIVE MG/DL
HADV DNA SPEC NAA+PROBE: NOT DETECTED
HADV DNA SPEC NAA+PROBE: NOT DETECTED
HBA1C MFR BLD: 5.7 % (ref 4.8–5.6)
HCO3 BLDA-SCNC: 25.2 MMOL/L (ref 22–28)
HCOV 229E RNA SPEC QL NAA+PROBE: NOT DETECTED
HCOV 229E RNA SPEC QL NAA+PROBE: NOT DETECTED
HCOV HKU1 RNA SPEC QL NAA+PROBE: NOT DETECTED
HCOV HKU1 RNA SPEC QL NAA+PROBE: NOT DETECTED
HCOV NL63 RNA SPEC QL NAA+PROBE: NOT DETECTED
HCOV NL63 RNA SPEC QL NAA+PROBE: NOT DETECTED
HCOV OC43 RNA SPEC QL NAA+PROBE: NOT DETECTED
HCOV OC43 RNA SPEC QL NAA+PROBE: NOT DETECTED
HCT VFR BLD AUTO: 29.8 % (ref 34–46.6)
HCT VFR BLD AUTO: 31.5 % (ref 34–46.6)
HCT VFR BLD AUTO: 32.6 % (ref 34–46.6)
HCT VFR BLD AUTO: 33.4 % (ref 34–46.6)
HCT VFR BLD AUTO: 33.4 % (ref 34–46.6)
HCT VFR BLD AUTO: 34.1 % (ref 34–46.6)
HCT VFR BLD AUTO: 35.2 % (ref 34–46.6)
HCT VFR BLD AUTO: 36.8 % (ref 34–46.6)
HCT VFR BLD AUTO: 37.8 % (ref 34–46.6)
HCT VFR BLD AUTO: 38.1 % (ref 34–46.6)
HCT VFR BLD AUTO: 38.3 % (ref 34–46.6)
HCT VFR BLD AUTO: 40.7 % (ref 34–46.6)
HCT VFR BLD AUTO: 40.8 % (ref 34–46.6)
HCT VFR BLD AUTO: 41.3 % (ref 34–46.6)
HCT VFR BLD AUTO: 42.7 % (ref 34–46.6)
HDLC SERPL-MCNC: 64 MG/DL (ref 40–60)
HGB BLD-MCNC: 10.1 G/DL (ref 12–15.9)
HGB BLD-MCNC: 10.4 G/DL (ref 12–15.9)
HGB BLD-MCNC: 10.4 G/DL (ref 12–15.9)
HGB BLD-MCNC: 10.6 G/DL (ref 12–15.9)
HGB BLD-MCNC: 10.9 G/DL (ref 12–15.9)
HGB BLD-MCNC: 11.3 G/DL (ref 12–15.9)
HGB BLD-MCNC: 11.6 G/DL (ref 12–15.9)
HGB BLD-MCNC: 11.6 G/DL (ref 12–15.9)
HGB BLD-MCNC: 11.9 G/DL (ref 12–15.9)
HGB BLD-MCNC: 12.1 G/DL (ref 12–15.9)
HGB BLD-MCNC: 12.2 G/DL (ref 12–15.9)
HGB BLD-MCNC: 13 G/DL (ref 12–15.9)
HGB BLD-MCNC: 13.3 G/DL (ref 12–15.9)
HGB BLD-MCNC: 9.5 G/DL (ref 12–15.9)
HGB BLD-MCNC: 9.5 G/DL (ref 12–15.9)
HGB UR QL STRIP.AUTO: NEGATIVE
HMPV RNA NPH QL NAA+NON-PROBE: DETECTED
HMPV RNA NPH QL NAA+NON-PROBE: NOT DETECTED
HOLD SPECIMEN: NORMAL
HPIV1 RNA SPEC QL NAA+PROBE: NOT DETECTED
HPIV1 RNA SPEC QL NAA+PROBE: NOT DETECTED
HPIV2 RNA SPEC QL NAA+PROBE: NOT DETECTED
HPIV2 RNA SPEC QL NAA+PROBE: NOT DETECTED
HPIV3 RNA NPH QL NAA+PROBE: DETECTED
HPIV3 RNA NPH QL NAA+PROBE: NOT DETECTED
HPIV4 P GENE NPH QL NAA+PROBE: NOT DETECTED
HPIV4 P GENE NPH QL NAA+PROBE: NOT DETECTED
IMM GRANULOCYTES # BLD AUTO: 0.04 10*3/MM3 (ref 0–0.05)
IMM GRANULOCYTES # BLD AUTO: 0.05 10*3/MM3 (ref 0–0.05)
IMM GRANULOCYTES # BLD AUTO: 0.07 10*3/MM3 (ref 0–0.05)
IMM GRANULOCYTES # BLD AUTO: 0.08 10*3/MM3 (ref 0–0.05)
IMM GRANULOCYTES # BLD AUTO: 0.09 10*3/MM3 (ref 0–0.05)
IMM GRANULOCYTES # BLD AUTO: 0.1 10*3/MM3 (ref 0–0.05)
IMM GRANULOCYTES # BLD AUTO: 0.1 10*3/MM3 (ref 0–0.05)
IMM GRANULOCYTES # BLD AUTO: 0.11 10*3/MM3 (ref 0–0.05)
IMM GRANULOCYTES # BLD AUTO: 0.14 10*3/MM3 (ref 0–0.05)
IMM GRANULOCYTES # BLD AUTO: 0.53 10*3/MM3 (ref 0–0.05)
IMM GRANULOCYTES NFR BLD AUTO: 0.5 % (ref 0–0.5)
IMM GRANULOCYTES NFR BLD AUTO: 0.6 % (ref 0–0.5)
IMM GRANULOCYTES NFR BLD AUTO: 0.7 % (ref 0–0.5)
IMM GRANULOCYTES NFR BLD AUTO: 0.7 % (ref 0–0.5)
IMM GRANULOCYTES NFR BLD AUTO: 0.8 % (ref 0–0.5)
IMM GRANULOCYTES NFR BLD AUTO: 0.8 % (ref 0–0.5)
IMM GRANULOCYTES NFR BLD AUTO: 0.9 % (ref 0–0.5)
IMM GRANULOCYTES NFR BLD AUTO: 1 % (ref 0–0.5)
IMM GRANULOCYTES NFR BLD AUTO: 1 % (ref 0–0.5)
IMM GRANULOCYTES NFR BLD AUTO: 1.1 % (ref 0–0.5)
IMM GRANULOCYTES NFR BLD AUTO: 4.5 % (ref 0–0.5)
INR PPP: 0.9 (ref 0.8–1.2)
INR PPP: 1.2 (ref 0.8–1.2)
INR PPP: 1.86 (ref 0.9–1.1)
KETONES UR QL STRIP: NEGATIVE
LAB AP CASE REPORT: NORMAL
LAB AP CLINICAL INFORMATION: NORMAL
LAB AP DIAGNOSIS COMMENT: NORMAL
LAB AP SPECIAL STAINS: NORMAL
LDLC SERPL CALC-MCNC: 58 MG/DL (ref 0–100)
LDLC/HDLC SERPL: 0.91 {RATIO}
LEUKOCYTE ESTERASE UR QL STRIP.AUTO: NEGATIVE
LYMPHOCYTES # BLD AUTO: 0.45 10*3/MM3 (ref 0.7–3.1)
LYMPHOCYTES # BLD AUTO: 0.61 10*3/MM3 (ref 0.7–3.1)
LYMPHOCYTES # BLD AUTO: 0.62 10*3/MM3 (ref 0.7–3.1)
LYMPHOCYTES # BLD AUTO: 0.63 10*3/MM3 (ref 0.7–3.1)
LYMPHOCYTES # BLD AUTO: 0.64 10*3/MM3 (ref 0.7–3.1)
LYMPHOCYTES # BLD AUTO: 0.69 10*3/MM3 (ref 0.7–3.1)
LYMPHOCYTES # BLD AUTO: 0.81 10*3/MM3 (ref 0.7–3.1)
LYMPHOCYTES # BLD AUTO: 0.95 10*3/MM3 (ref 0.7–3.1)
LYMPHOCYTES # BLD AUTO: 0.96 10*3/MM3 (ref 0.7–3.1)
LYMPHOCYTES # BLD AUTO: 0.98 10*3/MM3 (ref 0.7–3.1)
LYMPHOCYTES # BLD AUTO: 1 10*3/MM3 (ref 0.7–3.1)
LYMPHOCYTES # BLD AUTO: 1.04 10*3/MM3 (ref 0.7–3.1)
LYMPHOCYTES # BLD AUTO: 1.17 10*3/MM3 (ref 0.7–3.1)
LYMPHOCYTES # BLD AUTO: 1.27 10*3/MM3 (ref 0.7–3.1)
LYMPHOCYTES # BLD AUTO: 1.33 10*3/MM3 (ref 0.7–3.1)
LYMPHOCYTES NFR BLD AUTO: 10.7 % (ref 19.6–45.3)
LYMPHOCYTES NFR BLD AUTO: 12.3 % (ref 19.6–45.3)
LYMPHOCYTES NFR BLD AUTO: 12.8 % (ref 19.6–45.3)
LYMPHOCYTES NFR BLD AUTO: 12.9 % (ref 19.6–45.3)
LYMPHOCYTES NFR BLD AUTO: 13 % (ref 19.6–45.3)
LYMPHOCYTES NFR BLD AUTO: 19.9 % (ref 19.6–45.3)
LYMPHOCYTES NFR BLD AUTO: 20.1 % (ref 19.6–45.3)
LYMPHOCYTES NFR BLD AUTO: 3.8 % (ref 19.6–45.3)
LYMPHOCYTES NFR BLD AUTO: 5.8 % (ref 19.6–45.3)
LYMPHOCYTES NFR BLD AUTO: 7 % (ref 19.6–45.3)
LYMPHOCYTES NFR BLD AUTO: 7.7 % (ref 19.6–45.3)
LYMPHOCYTES NFR BLD AUTO: 7.7 % (ref 19.6–45.3)
LYMPHOCYTES NFR BLD AUTO: 7.9 % (ref 19.6–45.3)
LYMPHOCYTES NFR BLD AUTO: 9.5 % (ref 19.6–45.3)
LYMPHOCYTES NFR BLD AUTO: 9.7 % (ref 19.6–45.3)
M PNEUMO IGG SER IA-ACNC: NOT DETECTED
M PNEUMO IGG SER IA-ACNC: NOT DETECTED
MAGNESIUM SERPL-MCNC: 1.8 MG/DL (ref 1.6–2.4)
MAGNESIUM SERPL-MCNC: 1.8 MG/DL (ref 1.8–2.5)
MAGNESIUM SERPL-MCNC: 1.9 MG/DL (ref 1.8–2.5)
MAGNESIUM SERPL-MCNC: 2 MG/DL (ref 1.8–2.5)
MCH RBC QN AUTO: 28 PG (ref 26.6–33)
MCH RBC QN AUTO: 28.1 PG (ref 26.6–33)
MCH RBC QN AUTO: 28.5 PG (ref 26.6–33)
MCH RBC QN AUTO: 28.9 PG (ref 26.6–33)
MCH RBC QN AUTO: 29 PG (ref 26.6–33)
MCH RBC QN AUTO: 29.2 PG (ref 26.6–33)
MCH RBC QN AUTO: 29.4 PG (ref 26.6–33)
MCH RBC QN AUTO: 29.5 PG (ref 26.6–33)
MCH RBC QN AUTO: 29.8 PG (ref 26.6–33)
MCH RBC QN AUTO: 29.9 PG (ref 26.6–33)
MCH RBC QN AUTO: 29.9 PG (ref 26.6–33)
MCH RBC QN AUTO: 30.1 PG (ref 26.6–33)
MCH RBC QN AUTO: 30.3 PG (ref 26.6–33)
MCH RBC QN AUTO: 30.4 PG (ref 26.6–33)
MCH RBC QN AUTO: 30.9 PG (ref 26.6–33)
MCHC RBC AUTO-ENTMCNC: 28.1 G/DL (ref 31.5–35.7)
MCHC RBC AUTO-ENTMCNC: 29.9 G/DL (ref 31.5–35.7)
MCHC RBC AUTO-ENTMCNC: 30.2 G/DL (ref 31.5–35.7)
MCHC RBC AUTO-ENTMCNC: 30.2 G/DL (ref 31.5–35.7)
MCHC RBC AUTO-ENTMCNC: 30.5 G/DL (ref 31.5–35.7)
MCHC RBC AUTO-ENTMCNC: 30.7 G/DL (ref 31.5–35.7)
MCHC RBC AUTO-ENTMCNC: 30.7 G/DL (ref 31.5–35.7)
MCHC RBC AUTO-ENTMCNC: 31 G/DL (ref 31.5–35.7)
MCHC RBC AUTO-ENTMCNC: 31.1 G/DL (ref 31.5–35.7)
MCHC RBC AUTO-ENTMCNC: 31.1 G/DL (ref 31.5–35.7)
MCHC RBC AUTO-ENTMCNC: 31.2 G/DL (ref 31.5–35.7)
MCHC RBC AUTO-ENTMCNC: 31.6 G/DL (ref 31.5–35.7)
MCHC RBC AUTO-ENTMCNC: 31.9 G/DL (ref 31.5–35.7)
MCHC RBC AUTO-ENTMCNC: 31.9 G/DL (ref 31.5–35.7)
MCHC RBC AUTO-ENTMCNC: 32.5 G/DL (ref 31.5–35.7)
MCV RBC AUTO: 100.7 FL (ref 79–97)
MCV RBC AUTO: 108.4 FL (ref 79–97)
MCV RBC AUTO: 87.6 FL (ref 79–97)
MCV RBC AUTO: 90.9 FL (ref 79–97)
MCV RBC AUTO: 91.6 FL (ref 79–97)
MCV RBC AUTO: 92.3 FL (ref 79–97)
MCV RBC AUTO: 92.9 FL (ref 79–97)
MCV RBC AUTO: 93 FL (ref 79–97)
MCV RBC AUTO: 95.7 FL (ref 79–97)
MCV RBC AUTO: 95.7 FL (ref 79–97)
MCV RBC AUTO: 95.8 FL (ref 79–97)
MCV RBC AUTO: 96 FL (ref 79–97)
MCV RBC AUTO: 96.2 FL (ref 79–97)
MCV RBC AUTO: 98.7 FL (ref 79–97)
MCV RBC AUTO: 99.1 FL (ref 79–97)
MODALITY: ABNORMAL
MONOCYTES # BLD AUTO: 0.37 10*3/MM3 (ref 0.1–0.9)
MONOCYTES # BLD AUTO: 0.4 10*3/MM3 (ref 0.1–0.9)
MONOCYTES # BLD AUTO: 0.4 10*3/MM3 (ref 0.1–0.9)
MONOCYTES # BLD AUTO: 0.43 10*3/MM3 (ref 0.1–0.9)
MONOCYTES # BLD AUTO: 0.5 10*3/MM3 (ref 0.1–0.9)
MONOCYTES # BLD AUTO: 0.51 10*3/MM3 (ref 0.1–0.9)
MONOCYTES # BLD AUTO: 0.53 10*3/MM3 (ref 0.1–0.9)
MONOCYTES # BLD AUTO: 0.54 10*3/MM3 (ref 0.1–0.9)
MONOCYTES # BLD AUTO: 0.55 10*3/MM3 (ref 0.1–0.9)
MONOCYTES # BLD AUTO: 0.56 10*3/MM3 (ref 0.1–0.9)
MONOCYTES # BLD AUTO: 0.6 10*3/MM3 (ref 0.1–0.9)
MONOCYTES # BLD AUTO: 0.62 10*3/MM3 (ref 0.1–0.9)
MONOCYTES # BLD AUTO: 0.64 10*3/MM3 (ref 0.1–0.9)
MONOCYTES # BLD AUTO: 0.73 10*3/MM3 (ref 0.1–0.9)
MONOCYTES # BLD AUTO: 0.78 10*3/MM3 (ref 0.1–0.9)
MONOCYTES NFR BLD AUTO: 3.4 % (ref 5–12)
MONOCYTES NFR BLD AUTO: 3.4 % (ref 5–12)
MONOCYTES NFR BLD AUTO: 5 % (ref 5–12)
MONOCYTES NFR BLD AUTO: 5.8 % (ref 5–12)
MONOCYTES NFR BLD AUTO: 6.1 % (ref 5–12)
MONOCYTES NFR BLD AUTO: 6.3 % (ref 5–12)
MONOCYTES NFR BLD AUTO: 6.5 % (ref 5–12)
MONOCYTES NFR BLD AUTO: 6.5 % (ref 5–12)
MONOCYTES NFR BLD AUTO: 6.7 % (ref 5–12)
MONOCYTES NFR BLD AUTO: 7.1 % (ref 5–12)
MONOCYTES NFR BLD AUTO: 7.3 % (ref 5–12)
MONOCYTES NFR BLD AUTO: 7.9 % (ref 5–12)
MONOCYTES NFR BLD AUTO: 8.1 % (ref 5–12)
MONOCYTES NFR BLD AUTO: 8.1 % (ref 5–12)
MONOCYTES NFR BLD AUTO: 8.5 % (ref 5–12)
NEUTROPHILS # BLD AUTO: 10.73 10*3/MM3 (ref 1.7–7)
NEUTROPHILS # BLD AUTO: 11.21 10*3/MM3 (ref 1.7–7)
NEUTROPHILS # BLD AUTO: 3.94 10*3/MM3 (ref 1.7–7)
NEUTROPHILS # BLD AUTO: 4.54 10*3/MM3 (ref 1.7–7)
NEUTROPHILS # BLD AUTO: 4.75 10*3/MM3 (ref 1.7–7)
NEUTROPHILS # BLD AUTO: 5.26 10*3/MM3 (ref 1.7–7)
NEUTROPHILS # BLD AUTO: 5.86 10*3/MM3 (ref 1.7–7)
NEUTROPHILS # BLD AUTO: 5.86 10*3/MM3 (ref 1.7–7)
NEUTROPHILS # BLD AUTO: 6.31 10*3/MM3 (ref 1.7–7)
NEUTROPHILS # BLD AUTO: 6.51 10*3/MM3 (ref 1.7–7)
NEUTROPHILS # BLD AUTO: 7.33 10*3/MM3 (ref 1.7–7)
NEUTROPHILS # BLD AUTO: 7.46 10*3/MM3 (ref 1.7–7)
NEUTROPHILS # BLD AUTO: 7.84 10*3/MM3 (ref 1.7–7)
NEUTROPHILS # BLD AUTO: 9.04 10*3/MM3 (ref 1.7–7)
NEUTROPHILS # BLD AUTO: 9.06 10*3/MM3 (ref 1.7–7)
NEUTROPHILS NFR BLD AUTO: 67.2 % (ref 42.7–76)
NEUTROPHILS NFR BLD AUTO: 68.5 % (ref 42.7–76)
NEUTROPHILS NFR BLD AUTO: 75.3 % (ref 42.7–76)
NEUTROPHILS NFR BLD AUTO: 75.9 % (ref 42.7–76)
NEUTROPHILS NFR BLD AUTO: 75.9 % (ref 42.7–76)
NEUTROPHILS NFR BLD AUTO: 77 % (ref 42.7–76)
NEUTROPHILS NFR BLD AUTO: 79.5 % (ref 42.7–76)
NEUTROPHILS NFR BLD AUTO: 79.6 % (ref 42.7–76)
NEUTROPHILS NFR BLD AUTO: 79.7 % (ref 42.7–76)
NEUTROPHILS NFR BLD AUTO: 81.1 % (ref 42.7–76)
NEUTROPHILS NFR BLD AUTO: 82.1 % (ref 42.7–76)
NEUTROPHILS NFR BLD AUTO: 82.1 % (ref 42.7–76)
NEUTROPHILS NFR BLD AUTO: 85.3 % (ref 42.7–76)
NEUTROPHILS NFR BLD AUTO: 87.4 % (ref 42.7–76)
NEUTROPHILS NFR BLD AUTO: 91.5 % (ref 42.7–76)
NITRITE UR QL STRIP: NEGATIVE
NRBC BLD AUTO-RTO: 0 /100 WBC (ref 0–0.2)
NT-PROBNP SERPL-MCNC: 2043 PG/ML (ref 5–1800)
NT-PROBNP SERPL-MCNC: 2054 PG/ML (ref 5–1800)
NT-PROBNP SERPL-MCNC: 2377 PG/ML (ref 5–1800)
NT-PROBNP SERPL-MCNC: 2816 PG/ML (ref 5–1800)
NT-PROBNP SERPL-MCNC: 4658 PG/ML (ref 5–1800)
NT-PROBNP SERPL-MCNC: 6722 PG/ML (ref 5–1800)
PATH REPORT.ADDENDUM SPEC: NORMAL
PATH REPORT.FINAL DX SPEC: NORMAL
PATH REPORT.GROSS SPEC: NORMAL
PCO2 BLDA: 38.8 MM HG (ref 35–45)
PH BLDA: 7.42 PH UNITS (ref 7.35–7.45)
PH UR STRIP.AUTO: 7 [PH] (ref 5–8)
PHOSPHATE SERPL-MCNC: 2.5 MG/DL (ref 2.5–4.5)
PHOSPHATE SERPL-MCNC: 2.6 MG/DL (ref 2.5–4.5)
PHOSPHATE SERPL-MCNC: 3.3 MG/DL (ref 2.5–4.5)
PLAT MORPH BLD: NORMAL
PLATELET # BLD AUTO: 198 10*3/MM3 (ref 140–450)
PLATELET # BLD AUTO: 212 10*3/MM3 (ref 140–450)
PLATELET # BLD AUTO: 229 10*3/MM3 (ref 140–450)
PLATELET # BLD AUTO: 249 10*3/MM3 (ref 140–450)
PLATELET # BLD AUTO: 258 10*3/MM3 (ref 140–450)
PLATELET # BLD AUTO: 265 10*3/MM3 (ref 140–450)
PLATELET # BLD AUTO: 313 10*3/MM3 (ref 140–450)
PLATELET # BLD AUTO: 314 10*3/MM3 (ref 140–450)
PLATELET # BLD AUTO: 318 10*3/MM3 (ref 140–450)
PLATELET # BLD AUTO: 319 10*3/MM3 (ref 140–450)
PLATELET # BLD AUTO: 330 10*3/MM3 (ref 140–450)
PLATELET # BLD AUTO: 381 10*3/MM3 (ref 140–450)
PLATELET # BLD AUTO: 383 10*3/MM3 (ref 140–450)
PLATELET # BLD AUTO: 397 10*3/MM3 (ref 140–450)
PLATELET # BLD AUTO: 405 10*3/MM3 (ref 140–450)
PMV BLD AUTO: 10.2 FL (ref 6–12)
PMV BLD AUTO: 8.7 FL (ref 6–12)
PMV BLD AUTO: 8.8 FL (ref 6–12)
PMV BLD AUTO: 8.9 FL (ref 6–12)
PMV BLD AUTO: 9 FL (ref 6–12)
PMV BLD AUTO: 9.1 FL (ref 6–12)
PMV BLD AUTO: 9.3 FL (ref 6–12)
PMV BLD AUTO: 9.3 FL (ref 6–12)
PMV BLD AUTO: 9.8 FL (ref 6–12)
PMV BLD AUTO: 9.9 FL (ref 6–12)
PMV BLD AUTO: 9.9 FL (ref 6–12)
PO2 BLDA: 78.9 MM HG (ref 80–100)
POTASSIUM BLD-SCNC: 3.4 MMOL/L (ref 3.5–5.2)
POTASSIUM BLD-SCNC: 3.7 MMOL/L (ref 3.5–5.2)
POTASSIUM BLD-SCNC: 3.8 MMOL/L (ref 3.5–4.7)
POTASSIUM BLD-SCNC: 3.8 MMOL/L (ref 3.5–5.2)
POTASSIUM BLD-SCNC: 3.9 MMOL/L (ref 3.5–4.7)
POTASSIUM BLD-SCNC: 3.9 MMOL/L (ref 3.5–4.7)
POTASSIUM BLD-SCNC: 4 MMOL/L (ref 3.5–4.7)
POTASSIUM BLD-SCNC: 4 MMOL/L (ref 3.5–4.7)
POTASSIUM BLD-SCNC: 4.1 MMOL/L (ref 3.5–4.7)
POTASSIUM BLD-SCNC: 4.1 MMOL/L (ref 3.5–5.2)
POTASSIUM BLD-SCNC: 4.2 MMOL/L (ref 3.5–5.2)
POTASSIUM BLD-SCNC: 4.2 MMOL/L (ref 3.5–5.2)
POTASSIUM BLD-SCNC: 4.3 MMOL/L (ref 3.5–4.7)
POTASSIUM BLD-SCNC: 4.4 MMOL/L (ref 3.5–4.7)
POTASSIUM BLD-SCNC: 4.4 MMOL/L (ref 3.5–5.2)
POTASSIUM BLD-SCNC: 4.5 MMOL/L (ref 3.5–4.7)
POTASSIUM BLD-SCNC: 5 MMOL/L (ref 3.5–4.7)
PROCALCITONIN SERPL-MCNC: 0.11 NG/ML (ref 0.1–0.25)
PROT SERPL-MCNC: 6.9 G/DL (ref 6–8.5)
PROT SERPL-MCNC: 7.2 G/DL (ref 6.3–8)
PROT SERPL-MCNC: 7.3 G/DL (ref 6.3–8)
PROT SERPL-MCNC: 7.4 G/DL (ref 6.3–8)
PROT SERPL-MCNC: 7.5 G/DL (ref 6–8.5)
PROT SERPL-MCNC: 7.6 G/DL (ref 6.3–8)
PROT SERPL-MCNC: 7.6 G/DL (ref 6.3–8)
PROT SERPL-MCNC: 7.8 G/DL (ref 6.3–8)
PROT SERPL-MCNC: 7.8 G/DL (ref 6.3–8)
PROT SERPL-MCNC: 7.8 G/DL (ref 6–8.5)
PROT SERPL-MCNC: 7.9 G/DL (ref 6.3–8)
PROT SERPL-MCNC: 7.9 G/DL (ref 6.3–8)
PROT SERPL-MCNC: 8 G/DL (ref 6.3–8)
PROT UR QL STRIP: NEGATIVE
PROTHROMBIN TIME: 11.3 SECONDS (ref 12.8–15.2)
PROTHROMBIN TIME: 14 SECONDS (ref 12.8–15.2)
PROTHROMBIN TIME: 21.1 SECONDS (ref 11.7–14.2)
RBC # BLD AUTO: 3.28 10*6/MM3 (ref 3.77–5.28)
RBC # BLD AUTO: 3.39 10*6/MM3 (ref 3.77–5.28)
RBC # BLD AUTO: 3.48 10*6/MM3 (ref 3.77–5.28)
RBC # BLD AUTO: 3.56 10*6/MM3 (ref 3.77–5.28)
RBC # BLD AUTO: 3.59 10*6/MM3 (ref 3.77–5.28)
RBC # BLD AUTO: 3.66 10*6/MM3 (ref 3.77–5.28)
RBC # BLD AUTO: 3.72 10*6/MM3 (ref 3.77–5.28)
RBC # BLD AUTO: 3.73 10*6/MM3 (ref 3.77–5.28)
RBC # BLD AUTO: 3.81 10*6/MM3 (ref 3.77–5.28)
RBC # BLD AUTO: 3.95 10*6/MM3 (ref 3.77–5.28)
RBC # BLD AUTO: 3.98 10*6/MM3 (ref 3.77–5.28)
RBC # BLD AUTO: 4.05 10*6/MM3 (ref 3.77–5.28)
RBC # BLD AUTO: 4.18 10*6/MM3 (ref 3.77–5.28)
RBC # BLD AUTO: 4.31 10*6/MM3 (ref 3.77–5.28)
RBC # BLD AUTO: 4.41 10*6/MM3 (ref 3.77–5.28)
RBC MORPH BLD: NORMAL
REF LAB TEST METHOD: NORMAL
RHINOVIRUS RNA SPEC NAA+PROBE: NOT DETECTED
RHINOVIRUS RNA SPEC NAA+PROBE: NOT DETECTED
RSV RNA NPH QL NAA+NON-PROBE: NOT DETECTED
RSV RNA NPH QL NAA+NON-PROBE: NOT DETECTED
SAO2 % BLDCOA: 95.8 % (ref 92–99)
SODIUM BLD-SCNC: 132 MMOL/L (ref 136–145)
SODIUM BLD-SCNC: 133 MMOL/L (ref 134–145)
SODIUM BLD-SCNC: 133 MMOL/L (ref 136–145)
SODIUM BLD-SCNC: 134 MMOL/L (ref 134–145)
SODIUM BLD-SCNC: 135 MMOL/L (ref 134–145)
SODIUM BLD-SCNC: 136 MMOL/L (ref 134–145)
SODIUM BLD-SCNC: 136 MMOL/L (ref 136–145)
SODIUM BLD-SCNC: 137 MMOL/L (ref 134–145)
SODIUM BLD-SCNC: 137 MMOL/L (ref 136–145)
SODIUM BLD-SCNC: 138 MMOL/L (ref 134–145)
SODIUM BLD-SCNC: 139 MMOL/L (ref 136–145)
SP GR UR STRIP: 1.01 (ref 1–1.03)
T4 FREE SERPL-MCNC: 1.25 NG/DL (ref 0.93–1.7)
T4 FREE SERPL-MCNC: 1.32 NG/DL (ref 0.93–1.7)
T4 FREE SERPL-MCNC: 1.61 NG/DL (ref 0.93–1.7)
TOTAL RATE: 24 BREATHS/MINUTE
TRIGL SERPL-MCNC: 73 MG/DL (ref 0–150)
TROPONIN T SERPL-MCNC: <0.01 NG/ML (ref 0–0.03)
TROPONIN T SERPL-MCNC: <0.01 NG/ML (ref 0–0.03)
TSH SERPL DL<=0.05 MIU/L-ACNC: 1.05 MIU/ML (ref 0.27–4.2)
TSH SERPL DL<=0.05 MIU/L-ACNC: 1.61 UIU/ML (ref 0.27–4.2)
TSH SERPL DL<=0.05 MIU/L-ACNC: 1.74 UIU/ML (ref 0.27–4.2)
TSH SERPL DL<=0.05 MIU/L-ACNC: 2.26 UIU/ML (ref 0.27–4.2)
UROBILINOGEN UR QL STRIP: NORMAL
VLDLC SERPL-MCNC: 14.6 MG/DL (ref 5–40)
WBC MORPH BLD: NORMAL
WBC NRBC COR # BLD: 10.63 10*3/MM3 (ref 3.4–10.8)
WBC NRBC COR # BLD: 11.73 10*3/MM3 (ref 3.4–10.8)
WBC NRBC COR # BLD: 11.91 10*3/MM3 (ref 3.4–10.8)
WBC NRBC COR # BLD: 12.81 10*3/MM3 (ref 3.4–10.8)
WBC NRBC COR # BLD: 5.87 10*3/MM3 (ref 3.4–10.8)
WBC NRBC COR # BLD: 6.31 10*3/MM3 (ref 3.4–10.8)
WBC NRBC COR # BLD: 6.61 10*3/MM3 (ref 3.4–10.8)
WBC NRBC COR # BLD: 6.63 10*3/MM3 (ref 3.4–10.8)
WBC NRBC COR # BLD: 7.36 10*3/MM3 (ref 3.4–10.8)
WBC NRBC COR # BLD: 7.7 10*3/MM3 (ref 3.4–10.8)
WBC NRBC COR # BLD: 7.72 10*3/MM3 (ref 3.4–10.8)
WBC NRBC COR # BLD: 8.45 10*3/MM3 (ref 3.4–10.8)
WBC NRBC COR # BLD: 8.93 10*3/MM3 (ref 3.4–10.8)
WBC NRBC COR # BLD: 9.2 10*3/MM3 (ref 3.4–10.8)
WBC NRBC COR # BLD: 9.85 10*3/MM3 (ref 3.4–10.8)

## 2019-01-01 PROCEDURE — 77300 RADIATION THERAPY DOSE PLAN: CPT | Performed by: RADIOLOGY

## 2019-01-01 PROCEDURE — 25010000002 METHYLPREDNISOLONE PER 125 MG: Performed by: EMERGENCY MEDICINE

## 2019-01-01 PROCEDURE — 84443 ASSAY THYROID STIM HORMONE: CPT | Performed by: INTERNAL MEDICINE

## 2019-01-01 PROCEDURE — 25810000003 SODIUM CHLORIDE 0.9 % WITH KCL 20 MEQ 20-0.9 MEQ/L-% SOLUTION: Performed by: HOSPITALIST

## 2019-01-01 PROCEDURE — 25010000002 PEMBROLIZUMAB 100 MG/4ML SOLUTION 4 ML VIAL: Performed by: INTERNAL MEDICINE

## 2019-01-01 PROCEDURE — 80048 BASIC METABOLIC PNL TOTAL CA: CPT | Performed by: HOSPITALIST

## 2019-01-01 PROCEDURE — 85025 COMPLETE CBC W/AUTO DIFF WBC: CPT | Performed by: INTERNAL MEDICINE

## 2019-01-01 PROCEDURE — 77336 RADIATION PHYSICS CONSULT: CPT | Performed by: RADIOLOGY

## 2019-01-01 PROCEDURE — 77412 RADIATION TX DELIVERY LVL 3: CPT | Performed by: RADIOLOGY

## 2019-01-01 PROCEDURE — 80053 COMPREHEN METABOLIC PANEL: CPT | Performed by: INTERNAL MEDICINE

## 2019-01-01 PROCEDURE — 80053 COMPREHEN METABOLIC PANEL: CPT

## 2019-01-01 PROCEDURE — 84100 ASSAY OF PHOSPHORUS: CPT | Performed by: INTERNAL MEDICINE

## 2019-01-01 PROCEDURE — A9552 F18 FDG: HCPCS | Performed by: INTERNAL MEDICINE

## 2019-01-01 PROCEDURE — 83735 ASSAY OF MAGNESIUM: CPT | Performed by: EMERGENCY MEDICINE

## 2019-01-01 PROCEDURE — 81003 URINALYSIS AUTO W/O SCOPE: CPT | Performed by: EMERGENCY MEDICINE

## 2019-01-01 PROCEDURE — 25010000002 FUROSEMIDE PER 20 MG: Performed by: HOSPITALIST

## 2019-01-01 PROCEDURE — 88367 INSITU HYBRIDIZATION AUTO: CPT

## 2019-01-01 PROCEDURE — 85025 COMPLETE CBC W/AUTO DIFF WBC: CPT | Performed by: HOSPITALIST

## 2019-01-01 PROCEDURE — 96413 CHEMO IV INFUSION 1 HR: CPT | Performed by: INTERNAL MEDICINE

## 2019-01-01 PROCEDURE — 85025 COMPLETE CBC W/AUTO DIFF WBC: CPT

## 2019-01-01 PROCEDURE — 25010000002 DENOSUMAB 120 MG/1.7ML SOLUTION: Performed by: INTERNAL MEDICINE

## 2019-01-01 PROCEDURE — 77370 RADIATION PHYSICS CONSULT: CPT | Performed by: RADIOLOGY

## 2019-01-01 PROCEDURE — 88342 IMHCHEM/IMCYTCHM 1ST ANTB: CPT | Performed by: INTERNAL MEDICINE

## 2019-01-01 PROCEDURE — 25010000002 MAGNESIUM SULFATE 2 GM/50ML SOLUTION: Performed by: EMERGENCY MEDICINE

## 2019-01-01 PROCEDURE — 88341 IMHCHEM/IMCYTCHM EA ADD ANTB: CPT | Performed by: INTERNAL MEDICINE

## 2019-01-01 PROCEDURE — 71045 X-RAY EXAM CHEST 1 VIEW: CPT

## 2019-01-01 PROCEDURE — 25010000002 ONDANSETRON PER 1 MG: Performed by: EMERGENCY MEDICINE

## 2019-01-01 PROCEDURE — 88307 TISSUE EXAM BY PATHOLOGIST: CPT | Performed by: INTERNAL MEDICINE

## 2019-01-01 PROCEDURE — 85610 PROTHROMBIN TIME: CPT

## 2019-01-01 PROCEDURE — 94799 UNLISTED PULMONARY SVC/PX: CPT

## 2019-01-01 PROCEDURE — 85007 BL SMEAR W/DIFF WBC COUNT: CPT | Performed by: HOSPITALIST

## 2019-01-01 PROCEDURE — 25010000002 PEMBROLIZUMAB 100 MG/4ML SOLUTION 4 ML VIAL: Performed by: NURSE PRACTITIONER

## 2019-01-01 PROCEDURE — 99232 SBSQ HOSP IP/OBS MODERATE 35: CPT | Performed by: INTERNAL MEDICINE

## 2019-01-01 PROCEDURE — 78815 PET IMAGE W/CT SKULL-THIGH: CPT

## 2019-01-01 PROCEDURE — 83036 HEMOGLOBIN GLYCOSYLATED A1C: CPT | Performed by: HOSPITALIST

## 2019-01-01 PROCEDURE — 77295 3-D RADIOTHERAPY PLAN: CPT | Performed by: RADIOLOGY

## 2019-01-01 PROCEDURE — 99222 1ST HOSP IP/OBS MODERATE 55: CPT | Performed by: INTERNAL MEDICINE

## 2019-01-01 PROCEDURE — 97162 PT EVAL MOD COMPLEX 30 MIN: CPT

## 2019-01-01 PROCEDURE — 77334 RADIATION TREATMENT AID(S): CPT | Performed by: RADIOLOGY

## 2019-01-01 PROCEDURE — 71046 X-RAY EXAM CHEST 2 VIEWS: CPT

## 2019-01-01 PROCEDURE — 77427 RADIATION TX MANAGEMENT X5: CPT | Performed by: RADIOLOGY

## 2019-01-01 PROCEDURE — 99213 OFFICE O/P EST LOW 20 MIN: CPT | Performed by: NURSE PRACTITIONER

## 2019-01-01 PROCEDURE — 82962 GLUCOSE BLOOD TEST: CPT

## 2019-01-01 PROCEDURE — 93005 ELECTROCARDIOGRAM TRACING: CPT | Performed by: HOSPITALIST

## 2019-01-01 PROCEDURE — 36415 COLL VENOUS BLD VENIPUNCTURE: CPT | Performed by: INTERNAL MEDICINE

## 2019-01-01 PROCEDURE — G0463 HOSPITAL OUTPT CLINIC VISIT: HCPCS | Performed by: INTERNAL MEDICINE

## 2019-01-01 PROCEDURE — 94640 AIRWAY INHALATION TREATMENT: CPT

## 2019-01-01 PROCEDURE — 99215 OFFICE O/P EST HI 40 MIN: CPT | Performed by: INTERNAL MEDICINE

## 2019-01-01 PROCEDURE — 87040 BLOOD CULTURE FOR BACTERIA: CPT | Performed by: EMERGENCY MEDICINE

## 2019-01-01 PROCEDURE — 97110 THERAPEUTIC EXERCISES: CPT

## 2019-01-01 PROCEDURE — 88305 TISSUE EXAM BY PATHOLOGIST: CPT | Performed by: RADIOLOGY

## 2019-01-01 PROCEDURE — 83880 ASSAY OF NATRIURETIC PEPTIDE: CPT | Performed by: HOSPITALIST

## 2019-01-01 PROCEDURE — 80053 COMPREHEN METABOLIC PANEL: CPT | Performed by: EMERGENCY MEDICINE

## 2019-01-01 PROCEDURE — 99284 EMERGENCY DEPT VISIT MOD MDM: CPT

## 2019-01-01 PROCEDURE — 83735 ASSAY OF MAGNESIUM: CPT

## 2019-01-01 PROCEDURE — 0 FLUDEOXYGLUCOSE F18 SOLUTION: Performed by: INTERNAL MEDICINE

## 2019-01-01 PROCEDURE — 77280 THER RAD SIMULAJ FIELD SMPL: CPT | Performed by: RADIOLOGY

## 2019-01-01 PROCEDURE — 36415 COLL VENOUS BLD VENIPUNCTURE: CPT | Performed by: EMERGENCY MEDICINE

## 2019-01-01 PROCEDURE — 99215 OFFICE O/P EST HI 40 MIN: CPT | Performed by: NURSE PRACTITIONER

## 2019-01-01 PROCEDURE — 84484 ASSAY OF TROPONIN QUANT: CPT | Performed by: EMERGENCY MEDICINE

## 2019-01-01 PROCEDURE — 85610 PROTHROMBIN TIME: CPT | Performed by: EMERGENCY MEDICINE

## 2019-01-01 PROCEDURE — 73030 X-RAY EXAM OF SHOULDER: CPT

## 2019-01-01 PROCEDURE — 99285 EMERGENCY DEPT VISIT HI MDM: CPT

## 2019-01-01 PROCEDURE — 99214 OFFICE O/P EST MOD 30 MIN: CPT | Performed by: NURSE PRACTITIONER

## 2019-01-01 PROCEDURE — 77263 THER RADIOLOGY TX PLNG CPLX: CPT | Performed by: RADIOLOGY

## 2019-01-01 PROCEDURE — 99214 OFFICE O/P EST MOD 30 MIN: CPT | Performed by: INTERNAL MEDICINE

## 2019-01-01 PROCEDURE — G0463 HOSPITAL OUTPT CLINIC VISIT: HCPCS | Performed by: NURSE PRACTITIONER

## 2019-01-01 PROCEDURE — 25010000002 DENOSUMAB 120 MG/1.7ML SOLUTION: Performed by: NURSE PRACTITIONER

## 2019-01-01 PROCEDURE — 70450 CT HEAD/BRAIN W/O DYE: CPT

## 2019-01-01 PROCEDURE — 93296 REM INTERROG EVL PM/IDS: CPT | Performed by: INTERNAL MEDICINE

## 2019-01-01 PROCEDURE — 83735 ASSAY OF MAGNESIUM: CPT | Performed by: INTERNAL MEDICINE

## 2019-01-01 PROCEDURE — 93010 ELECTROCARDIOGRAM REPORT: CPT | Performed by: INTERNAL MEDICINE

## 2019-01-01 PROCEDURE — 99204 OFFICE O/P NEW MOD 45 MIN: CPT | Performed by: RADIOLOGY

## 2019-01-01 PROCEDURE — 96365 THER/PROPH/DIAG IV INF INIT: CPT

## 2019-01-01 PROCEDURE — 93280 PM DEVICE PROGR EVAL DUAL: CPT | Performed by: INTERNAL MEDICINE

## 2019-01-01 PROCEDURE — 88360 TUMOR IMMUNOHISTOCHEM/MANUAL: CPT

## 2019-01-01 PROCEDURE — 36415 COLL VENOUS BLD VENIPUNCTURE: CPT

## 2019-01-01 PROCEDURE — 96375 TX/PRO/DX INJ NEW DRUG ADDON: CPT

## 2019-01-01 PROCEDURE — 25010000003 LIDOCAINE 1 % SOLUTION: Performed by: RADIOLOGY

## 2019-01-01 PROCEDURE — 25010000002 CEFTRIAXONE PER 250 MG: Performed by: HOSPITALIST

## 2019-01-01 PROCEDURE — 77012 CT SCAN FOR NEEDLE BIOPSY: CPT

## 2019-01-01 PROCEDURE — 87581 M.PNEUMON DNA AMP PROBE: CPT | Performed by: EMERGENCY MEDICINE

## 2019-01-01 PROCEDURE — 87486 CHLMYD PNEUM DNA AMP PROBE: CPT | Performed by: EMERGENCY MEDICINE

## 2019-01-01 PROCEDURE — 97165 OT EVAL LOW COMPLEX 30 MIN: CPT

## 2019-01-01 PROCEDURE — 84439 ASSAY OF FREE THYROXINE: CPT | Performed by: INTERNAL MEDICINE

## 2019-01-01 PROCEDURE — 82803 BLOOD GASES ANY COMBINATION: CPT

## 2019-01-01 PROCEDURE — 0100U HC BIOFIRE FILMARRAY RESP PANEL 2: CPT | Performed by: EMERGENCY MEDICINE

## 2019-01-01 PROCEDURE — 77417 THER RADIOLOGY PORT IMAGE(S): CPT | Performed by: RADIOLOGY

## 2019-01-01 PROCEDURE — 96413 CHEMO IV INFUSION 1 HR: CPT | Performed by: NURSE PRACTITIONER

## 2019-01-01 PROCEDURE — 87633 RESP VIRUS 12-25 TARGETS: CPT | Performed by: EMERGENCY MEDICINE

## 2019-01-01 PROCEDURE — 88112 CYTOPATH CELL ENHANCE TECH: CPT | Performed by: INTERNAL MEDICINE

## 2019-01-01 PROCEDURE — 96372 THER/PROPH/DIAG INJ SC/IM: CPT | Performed by: INTERNAL MEDICINE

## 2019-01-01 PROCEDURE — 83880 ASSAY OF NATRIURETIC PEPTIDE: CPT | Performed by: EMERGENCY MEDICINE

## 2019-01-01 PROCEDURE — 93005 ELECTROCARDIOGRAM TRACING: CPT | Performed by: NURSE PRACTITIONER

## 2019-01-01 PROCEDURE — 88305 TISSUE EXAM BY PATHOLOGIST: CPT | Performed by: INTERNAL MEDICINE

## 2019-01-01 PROCEDURE — 93005 ELECTROCARDIOGRAM TRACING: CPT | Performed by: EMERGENCY MEDICINE

## 2019-01-01 PROCEDURE — 96372 THER/PROPH/DIAG INJ SC/IM: CPT | Performed by: NURSE PRACTITIONER

## 2019-01-01 PROCEDURE — 93294 REM INTERROG EVL PM/LDLS PM: CPT | Performed by: INTERNAL MEDICINE

## 2019-01-01 PROCEDURE — 77290 THER RAD SIMULAJ FIELD CPLX: CPT | Performed by: RADIOLOGY

## 2019-01-01 PROCEDURE — 84145 PROCALCITONIN (PCT): CPT | Performed by: EMERGENCY MEDICINE

## 2019-01-01 PROCEDURE — 25010000002 PROPOFOL 10 MG/ML EMULSION: Performed by: ANESTHESIOLOGY

## 2019-01-01 PROCEDURE — 85025 COMPLETE CBC W/AUTO DIFF WBC: CPT | Performed by: EMERGENCY MEDICINE

## 2019-01-01 PROCEDURE — 83605 ASSAY OF LACTIC ACID: CPT | Performed by: EMERGENCY MEDICINE

## 2019-01-01 PROCEDURE — 25010000002 MORPHINE PER 10 MG: Performed by: RADIOLOGY

## 2019-01-01 PROCEDURE — G0463 HOSPITAL OUTPT CLINIC VISIT: HCPCS | Performed by: RADIOLOGY

## 2019-01-01 PROCEDURE — 25010000002 PROPOFOL 1000 MG/ML EMULSION: Performed by: ANESTHESIOLOGY

## 2019-01-01 PROCEDURE — 73552 X-RAY EXAM OF FEMUR 2/>: CPT

## 2019-01-01 PROCEDURE — 80061 LIPID PANEL: CPT | Performed by: HOSPITALIST

## 2019-01-01 PROCEDURE — 99223 1ST HOSP IP/OBS HIGH 75: CPT | Performed by: INTERNAL MEDICINE

## 2019-01-01 PROCEDURE — 84100 ASSAY OF PHOSPHORUS: CPT

## 2019-01-01 PROCEDURE — 99232 SBSQ HOSP IP/OBS MODERATE 35: CPT | Performed by: NURSE PRACTITIONER

## 2019-01-01 PROCEDURE — 71250 CT THORAX DX C-: CPT

## 2019-01-01 PROCEDURE — 80053 COMPREHEN METABOLIC PANEL: CPT | Performed by: HOSPITALIST

## 2019-01-01 PROCEDURE — 36600 WITHDRAWAL OF ARTERIAL BLOOD: CPT

## 2019-01-01 PROCEDURE — 84443 ASSAY THYROID STIM HORMONE: CPT | Performed by: HOSPITALIST

## 2019-01-01 PROCEDURE — C1726 CATH, BAL DIL, NON-VASCULAR: HCPCS | Performed by: INTERNAL MEDICINE

## 2019-01-01 PROCEDURE — 87798 DETECT AGENT NOS DNA AMP: CPT | Performed by: EMERGENCY MEDICINE

## 2019-01-01 RX ORDER — ONDANSETRON HYDROCHLORIDE 8 MG/1
8 TABLET, FILM COATED ORAL 3 TIMES DAILY PRN
Qty: 30 TABLET | Refills: 5 | Status: SHIPPED | OUTPATIENT
Start: 2019-01-01 | End: 2020-01-01

## 2019-01-01 RX ORDER — AZITHROMYCIN 250 MG/1
TABLET, FILM COATED ORAL DAILY
COMMUNITY
Start: 2019-01-01 | End: 2019-01-01

## 2019-01-01 RX ORDER — SODIUM CHLORIDE 9 MG/ML
250 INJECTION, SOLUTION INTRAVENOUS ONCE
Status: COMPLETED | OUTPATIENT
Start: 2019-01-01 | End: 2019-01-01

## 2019-01-01 RX ORDER — SENNA AND DOCUSATE SODIUM 50; 8.6 MG/1; MG/1
2 TABLET, FILM COATED ORAL 2 TIMES DAILY
Qty: 120 TABLET | Refills: 0 | Status: SHIPPED | OUTPATIENT
Start: 2019-01-01 | End: 2019-01-01

## 2019-01-01 RX ORDER — IPRATROPIUM BROMIDE AND ALBUTEROL SULFATE 2.5; .5 MG/3ML; MG/3ML
SOLUTION RESPIRATORY (INHALATION)
Refills: 6 | COMMUNITY
Start: 2019-01-01

## 2019-01-01 RX ORDER — POLYETHYLENE GLYCOL 3350 17 G/17G
17 POWDER, FOR SOLUTION ORAL DAILY
Status: DISCONTINUED | OUTPATIENT
Start: 2019-01-01 | End: 2019-01-01

## 2019-01-01 RX ORDER — SENNA PLUS 8.6 MG/1
2 TABLET ORAL 2 TIMES DAILY
COMMUNITY

## 2019-01-01 RX ORDER — DOXYCYCLINE 100 MG/1
100 CAPSULE ORAL EVERY 12 HOURS SCHEDULED
Status: DISCONTINUED | OUTPATIENT
Start: 2019-01-01 | End: 2019-01-01 | Stop reason: HOSPADM

## 2019-01-01 RX ORDER — HYDROCODONE BITARTRATE AND ACETAMINOPHEN 5; 325 MG/1; MG/1
1 TABLET ORAL EVERY 6 HOURS PRN
Qty: 90 TABLET | Refills: 0 | Status: SHIPPED | OUTPATIENT
Start: 2019-01-01 | End: 2020-01-01 | Stop reason: SDUPTHER

## 2019-01-01 RX ORDER — SODIUM CHLORIDE 0.9 % (FLUSH) 0.9 %
10 SYRINGE (ML) INJECTION AS NEEDED
Status: DISCONTINUED | OUTPATIENT
Start: 2019-01-01 | End: 2019-01-01 | Stop reason: HOSPADM

## 2019-01-01 RX ORDER — GUAIFENESIN 600 MG/1
600 TABLET, EXTENDED RELEASE ORAL EVERY 12 HOURS SCHEDULED
Qty: 60 TABLET | Refills: 0 | Status: SHIPPED | OUTPATIENT
Start: 2019-01-01 | End: 2019-01-01

## 2019-01-01 RX ORDER — LIDOCAINE HYDROCHLORIDE 10 MG/ML
20 INJECTION, SOLUTION INFILTRATION; PERINEURAL ONCE
Status: COMPLETED | OUTPATIENT
Start: 2019-01-01 | End: 2019-01-01

## 2019-01-01 RX ORDER — SODIUM CHLORIDE 0.9 % (FLUSH) 0.9 %
3 SYRINGE (ML) INJECTION EVERY 12 HOURS SCHEDULED
Status: DISCONTINUED | OUTPATIENT
Start: 2019-01-01 | End: 2019-01-01 | Stop reason: HOSPADM

## 2019-01-01 RX ORDER — POTASSIUM CHLORIDE 750 MG/1
20 CAPSULE, EXTENDED RELEASE ORAL DAILY
Status: DISCONTINUED | OUTPATIENT
Start: 2019-01-01 | End: 2019-01-01

## 2019-01-01 RX ORDER — POTASSIUM CHLORIDE 750 MG/1
20 CAPSULE, EXTENDED RELEASE ORAL DAILY
Status: DISCONTINUED | OUTPATIENT
Start: 2019-01-01 | End: 2019-01-01 | Stop reason: HOSPADM

## 2019-01-01 RX ORDER — METHYLPREDNISOLONE SODIUM SUCCINATE 125 MG/2ML
125 INJECTION, POWDER, LYOPHILIZED, FOR SOLUTION INTRAMUSCULAR; INTRAVENOUS ONCE
Status: COMPLETED | OUTPATIENT
Start: 2019-01-01 | End: 2019-01-01

## 2019-01-01 RX ORDER — POLYETHYLENE GLYCOL 3350 17 G/17G
17 POWDER, FOR SOLUTION ORAL 2 TIMES DAILY
Start: 2019-01-01

## 2019-01-01 RX ORDER — PROMETHAZINE HYDROCHLORIDE 25 MG/1
25 SUPPOSITORY RECTAL ONCE AS NEEDED
Status: DISCONTINUED | OUTPATIENT
Start: 2019-01-01 | End: 2019-01-01 | Stop reason: HOSPADM

## 2019-01-01 RX ORDER — PROMETHAZINE HYDROCHLORIDE 25 MG/1
25 TABLET ORAL ONCE AS NEEDED
Status: DISCONTINUED | OUTPATIENT
Start: 2019-01-01 | End: 2019-01-01 | Stop reason: HOSPADM

## 2019-01-01 RX ORDER — PANTOPRAZOLE SODIUM 40 MG/1
40 TABLET, DELAYED RELEASE ORAL EVERY MORNING
Status: DISCONTINUED | OUTPATIENT
Start: 2019-01-01 | End: 2019-01-01 | Stop reason: HOSPADM

## 2019-01-01 RX ORDER — POTASSIUM CHLORIDE 1.5 G/1.77G
40 POWDER, FOR SOLUTION ORAL AS NEEDED
Status: DISCONTINUED | OUTPATIENT
Start: 2019-01-01 | End: 2019-01-01

## 2019-01-01 RX ORDER — SODIUM CHLORIDE, SODIUM LACTATE, POTASSIUM CHLORIDE, CALCIUM CHLORIDE 600; 310; 30; 20 MG/100ML; MG/100ML; MG/100ML; MG/100ML
30 INJECTION, SOLUTION INTRAVENOUS CONTINUOUS PRN
Status: DISCONTINUED | OUTPATIENT
Start: 2019-01-01 | End: 2019-01-01

## 2019-01-01 RX ORDER — PROPOFOL 10 MG/ML
VIAL (ML) INTRAVENOUS AS NEEDED
Status: DISCONTINUED | OUTPATIENT
Start: 2019-01-01 | End: 2019-01-01 | Stop reason: SURG

## 2019-01-01 RX ORDER — SODIUM CHLORIDE 9 MG/ML
500 INJECTION, SOLUTION INTRAVENOUS ONCE
Status: CANCELLED | OUTPATIENT
Start: 2019-01-01

## 2019-01-01 RX ORDER — FUROSEMIDE 40 MG/1
40 TABLET ORAL DAILY
Status: DISCONTINUED | OUTPATIENT
Start: 2019-01-01 | End: 2019-01-01

## 2019-01-01 RX ORDER — ONDANSETRON 2 MG/ML
4 INJECTION INTRAMUSCULAR; INTRAVENOUS ONCE
Status: COMPLETED | OUTPATIENT
Start: 2019-01-01 | End: 2019-01-01

## 2019-01-01 RX ORDER — POTASSIUM CHLORIDE 750 MG/1
40 CAPSULE, EXTENDED RELEASE ORAL AS NEEDED
Status: DISCONTINUED | OUTPATIENT
Start: 2019-01-01 | End: 2019-01-01

## 2019-01-01 RX ORDER — POTASSIUM CHLORIDE 7.45 MG/ML
10 INJECTION INTRAVENOUS
Status: DISCONTINUED | OUTPATIENT
Start: 2019-01-01 | End: 2019-01-01

## 2019-01-01 RX ORDER — LIDOCAINE HYDROCHLORIDE 20 MG/ML
INJECTION, SOLUTION EPIDURAL; INFILTRATION; INTRACAUDAL; PERINEURAL AS NEEDED
Status: DISCONTINUED | OUTPATIENT
Start: 2019-01-01 | End: 2019-01-01 | Stop reason: HOSPADM

## 2019-01-01 RX ORDER — APIXABAN 5 MG/1
TABLET, FILM COATED ORAL
Qty: 180 TABLET | Refills: 2 | Status: SHIPPED | OUTPATIENT
Start: 2019-01-01 | End: 2020-01-01 | Stop reason: ALTCHOICE

## 2019-01-01 RX ORDER — CEFTRIAXONE SODIUM 1 G/50ML
1 INJECTION, SOLUTION INTRAVENOUS EVERY 24 HOURS
Status: DISCONTINUED | OUTPATIENT
Start: 2019-01-01 | End: 2019-01-01

## 2019-01-01 RX ORDER — HYDROCODONE BITARTRATE AND ACETAMINOPHEN 5; 325 MG/1; MG/1
1 TABLET ORAL EVERY 6 HOURS PRN
Qty: 60 TABLET | Refills: 0 | Status: SHIPPED | OUTPATIENT
Start: 2019-01-01 | End: 2019-01-01 | Stop reason: SDUPTHER

## 2019-01-01 RX ORDER — AZITHROMYCIN 250 MG/1
250 TABLET, FILM COATED ORAL DAILY
Qty: 4 TABLET | Refills: 0 | Status: SHIPPED | OUTPATIENT
Start: 2019-01-01 | End: 2019-01-01

## 2019-01-01 RX ORDER — HYDROCODONE BITARTRATE AND ACETAMINOPHEN 5; 325 MG/1; MG/1
1 TABLET ORAL EVERY 6 HOURS PRN
Qty: 90 TABLET | Refills: 0 | Status: SHIPPED | OUTPATIENT
Start: 2019-01-01 | End: 2019-01-01

## 2019-01-01 RX ORDER — SODIUM CHLORIDE 9 MG/ML
250 INJECTION, SOLUTION INTRAVENOUS ONCE
Status: CANCELLED | OUTPATIENT
Start: 2019-01-01

## 2019-01-01 RX ORDER — BISACODYL 10 MG
10 SUPPOSITORY, RECTAL RECTAL DAILY PRN
Status: DISCONTINUED | OUTPATIENT
Start: 2019-01-01 | End: 2019-01-01

## 2019-01-01 RX ORDER — PROMETHAZINE HYDROCHLORIDE 25 MG/ML
12.5 INJECTION, SOLUTION INTRAMUSCULAR; INTRAVENOUS ONCE AS NEEDED
Status: DISCONTINUED | OUTPATIENT
Start: 2019-01-01 | End: 2019-01-01 | Stop reason: HOSPADM

## 2019-01-01 RX ORDER — IPRATROPIUM BROMIDE AND ALBUTEROL SULFATE 2.5; .5 MG/3ML; MG/3ML
3 SOLUTION RESPIRATORY (INHALATION) ONCE
Status: COMPLETED | OUTPATIENT
Start: 2019-01-01 | End: 2019-01-01

## 2019-01-01 RX ORDER — HYDROCODONE BITARTRATE AND ACETAMINOPHEN 10; 325 MG/1; MG/1
1 TABLET ORAL EVERY 6 HOURS PRN
Qty: 90 TABLET | Refills: 0 | Status: SHIPPED | OUTPATIENT
Start: 2019-01-01 | End: 2019-01-01

## 2019-01-01 RX ORDER — IPRATROPIUM BROMIDE AND ALBUTEROL SULFATE 2.5; .5 MG/3ML; MG/3ML
3 SOLUTION RESPIRATORY (INHALATION)
Status: DISCONTINUED | OUTPATIENT
Start: 2019-01-01 | End: 2019-01-01 | Stop reason: HOSPADM

## 2019-01-01 RX ORDER — ACETAMINOPHEN 650 MG/1
650 SUPPOSITORY RECTAL ONCE
Status: COMPLETED | OUTPATIENT
Start: 2019-01-01 | End: 2019-01-01

## 2019-01-01 RX ORDER — MOXIFLOXACIN HYDROCHLORIDE 400 MG/1
400 TABLET ORAL DAILY
COMMUNITY
End: 2019-01-01 | Stop reason: HOSPADM

## 2019-01-01 RX ORDER — MELOXICAM 15 MG/1
15 TABLET ORAL DAILY
COMMUNITY
End: 2019-01-01 | Stop reason: HOSPADM

## 2019-01-01 RX ORDER — APIXABAN 5 MG/1
TABLET, FILM COATED ORAL
Qty: 180 TABLET | Refills: 1 | Status: SHIPPED | OUTPATIENT
Start: 2019-01-01 | End: 2019-01-01

## 2019-01-01 RX ORDER — FUROSEMIDE 10 MG/ML
40 INJECTION INTRAMUSCULAR; INTRAVENOUS ONCE
Status: COMPLETED | OUTPATIENT
Start: 2019-01-01 | End: 2019-01-01

## 2019-01-01 RX ORDER — METOPROLOL TARTRATE 37.5 MG/1
37.5 TABLET, FILM COATED ORAL EVERY 12 HOURS SCHEDULED
Qty: 60 TABLET | Refills: 0 | Status: SHIPPED | OUTPATIENT
Start: 2019-01-01 | End: 2019-01-01

## 2019-01-01 RX ORDER — PREDNISONE 20 MG/1
TABLET ORAL
COMMUNITY
Start: 2019-01-01 | End: 2020-01-01

## 2019-01-01 RX ORDER — HYDROCODONE BITARTRATE AND ACETAMINOPHEN 10; 325 MG/1; MG/1
1 TABLET ORAL EVERY 6 HOURS PRN
Qty: 90 TABLET | Refills: 0 | Status: SHIPPED | OUTPATIENT
Start: 2019-01-01 | End: 2019-01-01 | Stop reason: SDUPTHER

## 2019-01-01 RX ORDER — MORPHINE SULFATE 2 MG/ML
1 INJECTION, SOLUTION INTRAMUSCULAR; INTRAVENOUS
Status: DISCONTINUED | OUTPATIENT
Start: 2019-01-01 | End: 2019-01-01 | Stop reason: HOSPADM

## 2019-01-01 RX ORDER — BUMETANIDE 2 MG/1
2 TABLET ORAL DAILY
COMMUNITY

## 2019-01-01 RX ORDER — SENNA AND DOCUSATE SODIUM 50; 8.6 MG/1; MG/1
2 TABLET, FILM COATED ORAL 2 TIMES DAILY
Status: DISCONTINUED | OUTPATIENT
Start: 2019-01-01 | End: 2019-01-01 | Stop reason: HOSPADM

## 2019-01-01 RX ORDER — SODIUM CHLORIDE 0.9 % (FLUSH) 0.9 %
1-10 SYRINGE (ML) INJECTION AS NEEDED
Status: DISCONTINUED | OUTPATIENT
Start: 2019-01-01 | End: 2019-01-01 | Stop reason: HOSPADM

## 2019-01-01 RX ORDER — POLYETHYLENE GLYCOL 3350 17 G/17G
17 POWDER, FOR SOLUTION ORAL 2 TIMES DAILY
Status: DISCONTINUED | OUTPATIENT
Start: 2019-01-01 | End: 2019-01-01 | Stop reason: HOSPADM

## 2019-01-01 RX ORDER — GUAIFENESIN 600 MG/1
600 TABLET, EXTENDED RELEASE ORAL EVERY 12 HOURS SCHEDULED
Status: DISCONTINUED | OUTPATIENT
Start: 2019-01-01 | End: 2019-01-01 | Stop reason: HOSPADM

## 2019-01-01 RX ORDER — IPRATROPIUM BROMIDE AND ALBUTEROL SULFATE 2.5; .5 MG/3ML; MG/3ML
3 SOLUTION RESPIRATORY (INHALATION)
Status: DISCONTINUED | OUTPATIENT
Start: 2019-01-01 | End: 2019-01-01

## 2019-01-01 RX ORDER — OMEPRAZOLE 40 MG/1
40 CAPSULE, DELAYED RELEASE ORAL DAILY
COMMUNITY

## 2019-01-01 RX ORDER — HYDROCODONE BITARTRATE AND ACETAMINOPHEN 5; 325 MG/1; MG/1
1 TABLET ORAL EVERY 6 HOURS PRN
Qty: 90 TABLET | Refills: 0 | Status: SHIPPED | OUTPATIENT
Start: 2019-01-01 | End: 2019-01-01 | Stop reason: SDUPTHER

## 2019-01-01 RX ORDER — LIDOCAINE HYDROCHLORIDE 10 MG/ML
INJECTION, SOLUTION EPIDURAL; INFILTRATION; INTRACAUDAL; PERINEURAL AS NEEDED
Status: DISCONTINUED | OUTPATIENT
Start: 2019-01-01 | End: 2019-01-01 | Stop reason: HOSPADM

## 2019-01-01 RX ORDER — PREDNISONE 50 MG/1
50 TABLET ORAL DAILY
Qty: 5 TABLET | Refills: 0 | Status: SHIPPED | OUTPATIENT
Start: 2019-01-01 | End: 2019-01-01

## 2019-01-01 RX ORDER — DOXYCYCLINE 100 MG/1
100 CAPSULE ORAL EVERY 12 HOURS SCHEDULED
Qty: 9 CAPSULE | Refills: 0 | Status: SHIPPED | OUTPATIENT
Start: 2019-01-01 | End: 2019-01-01

## 2019-01-01 RX ORDER — ONDANSETRON 2 MG/ML
4 INJECTION INTRAMUSCULAR; INTRAVENOUS ONCE
Status: DISCONTINUED | OUTPATIENT
Start: 2019-01-01 | End: 2019-01-01

## 2019-01-01 RX ORDER — SODIUM CHLORIDE 9 MG/ML
30 INJECTION, SOLUTION INTRAVENOUS CONTINUOUS PRN
Status: DISCONTINUED | OUTPATIENT
Start: 2019-01-01 | End: 2019-01-01 | Stop reason: HOSPADM

## 2019-01-01 RX ORDER — SODIUM CHLORIDE 9 MG/ML
500 INJECTION, SOLUTION INTRAVENOUS ONCE
Status: COMPLETED | OUTPATIENT
Start: 2019-01-01 | End: 2019-01-01

## 2019-01-01 RX ORDER — HYDROCODONE BITARTRATE AND ACETAMINOPHEN 5; 325 MG/1; MG/1
1 TABLET ORAL EVERY 8 HOURS PRN
COMMUNITY
End: 2019-01-01 | Stop reason: SDUPTHER

## 2019-01-01 RX ORDER — AZITHROMYCIN 250 MG/1
500 TABLET, FILM COATED ORAL ONCE
Status: COMPLETED | OUTPATIENT
Start: 2019-01-01 | End: 2019-01-01

## 2019-01-01 RX ORDER — CEFTRIAXONE SODIUM 2 G/50ML
2 INJECTION, SOLUTION INTRAVENOUS ONCE
Status: DISCONTINUED | OUTPATIENT
Start: 2019-01-01 | End: 2019-01-01

## 2019-01-01 RX ORDER — LIDOCAINE HYDROCHLORIDE 20 MG/ML
INJECTION, SOLUTION INFILTRATION; PERINEURAL AS NEEDED
Status: DISCONTINUED | OUTPATIENT
Start: 2019-01-01 | End: 2019-01-01 | Stop reason: SURG

## 2019-01-01 RX ORDER — MAGNESIUM SULFATE HEPTAHYDRATE 40 MG/ML
2 INJECTION, SOLUTION INTRAVENOUS ONCE
Status: COMPLETED | OUTPATIENT
Start: 2019-01-01 | End: 2019-01-01

## 2019-01-01 RX ORDER — HYDROCODONE BITARTRATE AND ACETAMINOPHEN 5; 325 MG/1; MG/1
1 TABLET ORAL EVERY 6 HOURS PRN
Qty: 90 TABLET | Refills: 0 | Status: CANCELLED | OUTPATIENT
Start: 2019-01-01

## 2019-01-01 RX ORDER — ONDANSETRON 2 MG/ML
4 INJECTION INTRAMUSCULAR; INTRAVENOUS EVERY 6 HOURS PRN
Status: DISCONTINUED | OUTPATIENT
Start: 2019-01-01 | End: 2019-01-01

## 2019-01-01 RX ORDER — ACETAMINOPHEN 650 MG/1
650 SUPPOSITORY RECTAL ONCE
Status: DISCONTINUED | OUTPATIENT
Start: 2019-01-01 | End: 2019-01-01

## 2019-01-01 RX ORDER — SODIUM CHLORIDE AND POTASSIUM CHLORIDE 150; 900 MG/100ML; MG/100ML
50 INJECTION, SOLUTION INTRAVENOUS CONTINUOUS
Status: DISCONTINUED | OUTPATIENT
Start: 2019-01-01 | End: 2019-01-01

## 2019-01-01 RX ADMIN — IPRATROPIUM BROMIDE AND ALBUTEROL SULFATE 3 ML: 2.5; .5 SOLUTION RESPIRATORY (INHALATION) at 20:32

## 2019-01-01 RX ADMIN — DOXYCYCLINE 100 MG: 100 INJECTION, POWDER, LYOPHILIZED, FOR SOLUTION INTRAVENOUS at 00:04

## 2019-01-01 RX ADMIN — SENNOSIDES,DOCUSATE SODIUM 2 TABLET: 50; 8.6 TABLET, FILM COATED ORAL at 09:55

## 2019-01-01 RX ADMIN — SODIUM CHLORIDE 250 ML: 9 INJECTION, SOLUTION INTRAVENOUS at 11:29

## 2019-01-01 RX ADMIN — IPRATROPIUM BROMIDE AND ALBUTEROL SULFATE 3 ML: 2.5; .5 SOLUTION RESPIRATORY (INHALATION) at 00:31

## 2019-01-01 RX ADMIN — IPRATROPIUM BROMIDE AND ALBUTEROL SULFATE 3 ML: 2.5; .5 SOLUTION RESPIRATORY (INHALATION) at 15:44

## 2019-01-01 RX ADMIN — SENNOSIDES,DOCUSATE SODIUM 2 TABLET: 50; 8.6 TABLET, FILM COATED ORAL at 16:28

## 2019-01-01 RX ADMIN — APIXABAN 5 MG: 5 TABLET, FILM COATED ORAL at 08:38

## 2019-01-01 RX ADMIN — ONDANSETRON HYDROCHLORIDE 4 MG: 2 SOLUTION INTRAMUSCULAR; INTRAVENOUS at 19:12

## 2019-01-01 RX ADMIN — PANTOPRAZOLE SODIUM 40 MG: 40 TABLET, DELAYED RELEASE ORAL at 08:37

## 2019-01-01 RX ADMIN — POTASSIUM CHLORIDE 20 MEQ: 750 CAPSULE, EXTENDED RELEASE ORAL at 15:30

## 2019-01-01 RX ADMIN — SODIUM CHLORIDE, PRESERVATIVE FREE 10 ML: 5 INJECTION INTRAVENOUS at 08:37

## 2019-01-01 RX ADMIN — LIDOCAINE HYDROCHLORIDE 20 ML: 10 INJECTION, SOLUTION INFILTRATION; PERINEURAL at 09:30

## 2019-01-01 RX ADMIN — MAGNESIUM SULFATE 2 G: 2 INJECTION INTRAVENOUS at 22:20

## 2019-01-01 RX ADMIN — IPRATROPIUM BROMIDE AND ALBUTEROL SULFATE 3 ML: 2.5; .5 SOLUTION RESPIRATORY (INHALATION) at 07:20

## 2019-01-01 RX ADMIN — FUROSEMIDE 40 MG: 10 INJECTION, SOLUTION INTRAMUSCULAR; INTRAVENOUS at 17:47

## 2019-01-01 RX ADMIN — METOPROLOL TARTRATE 37.5 MG: 25 TABLET ORAL at 08:51

## 2019-01-01 RX ADMIN — POTASSIUM CHLORIDE 40 MEQ: 750 CAPSULE, EXTENDED RELEASE ORAL at 20:35

## 2019-01-01 RX ADMIN — LIDOCAINE HYDROCHLORIDE 20 ML: 10 INJECTION, SOLUTION INFILTRATION; PERINEURAL at 10:18

## 2019-01-01 RX ADMIN — IPRATROPIUM BROMIDE AND ALBUTEROL SULFATE 3 ML: 2.5; .5 SOLUTION RESPIRATORY (INHALATION) at 10:39

## 2019-01-01 RX ADMIN — GUAIFENESIN 600 MG: 600 TABLET, EXTENDED RELEASE ORAL at 08:37

## 2019-01-01 RX ADMIN — IPRATROPIUM BROMIDE AND ALBUTEROL SULFATE 3 ML: 2.5; .5 SOLUTION RESPIRATORY (INHALATION) at 12:15

## 2019-01-01 RX ADMIN — APIXABAN 5 MG: 5 TABLET, FILM COATED ORAL at 08:37

## 2019-01-01 RX ADMIN — IPRATROPIUM BROMIDE AND ALBUTEROL SULFATE 3 ML: 2.5; .5 SOLUTION RESPIRATORY (INHALATION) at 07:36

## 2019-01-01 RX ADMIN — PROPOFOL 140 MCG/KG/MIN: 10 INJECTION, EMULSION INTRAVENOUS at 09:06

## 2019-01-01 RX ADMIN — ONDANSETRON HYDROCHLORIDE 4 MG: 2 SOLUTION INTRAMUSCULAR; INTRAVENOUS at 16:28

## 2019-01-01 RX ADMIN — GUAIFENESIN 600 MG: 600 TABLET, EXTENDED RELEASE ORAL at 20:28

## 2019-01-01 RX ADMIN — DOXYCYCLINE 100 MG: 100 INJECTION, POWDER, LYOPHILIZED, FOR SOLUTION INTRAVENOUS at 02:59

## 2019-01-01 RX ADMIN — APIXABAN 5 MG: 5 TABLET, FILM COATED ORAL at 20:02

## 2019-01-01 RX ADMIN — SODIUM CHLORIDE 250 ML: 9 INJECTION, SOLUTION INTRAVENOUS at 12:45

## 2019-01-01 RX ADMIN — SODIUM CHLORIDE 200 MG: 9 INJECTION, SOLUTION INTRAVENOUS at 10:52

## 2019-01-01 RX ADMIN — BISACODYL 10 MG: 10 SUPPOSITORY RECTAL at 06:10

## 2019-01-01 RX ADMIN — PANTOPRAZOLE SODIUM 40 MG: 40 TABLET, DELAYED RELEASE ORAL at 06:23

## 2019-01-01 RX ADMIN — APIXABAN 5 MG: 5 TABLET, FILM COATED ORAL at 08:51

## 2019-01-01 RX ADMIN — DOXYCYCLINE 100 MG: 100 INJECTION, POWDER, LYOPHILIZED, FOR SOLUTION INTRAVENOUS at 08:52

## 2019-01-01 RX ADMIN — METOPROLOL TARTRATE 37.5 MG: 25 TABLET ORAL at 09:36

## 2019-01-01 RX ADMIN — IPRATROPIUM BROMIDE AND ALBUTEROL SULFATE 3 ML: 2.5; .5 SOLUTION RESPIRATORY (INHALATION) at 10:35

## 2019-01-01 RX ADMIN — POLYETHYLENE GLYCOL 3350 17 G: 17 POWDER, FOR SOLUTION ORAL at 13:00

## 2019-01-01 RX ADMIN — DOXYCYCLINE 100 MG: 100 INJECTION, POWDER, LYOPHILIZED, FOR SOLUTION INTRAVENOUS at 20:02

## 2019-01-01 RX ADMIN — SODIUM CHLORIDE 200 MG: 9 INJECTION, SOLUTION INTRAVENOUS at 12:45

## 2019-01-01 RX ADMIN — IPRATROPIUM BROMIDE AND ALBUTEROL SULFATE 3 ML: 2.5; .5 SOLUTION RESPIRATORY (INHALATION) at 19:13

## 2019-01-01 RX ADMIN — CEFTRIAXONE SODIUM 1 G: 1 INJECTION, SOLUTION INTRAVENOUS at 22:09

## 2019-01-01 RX ADMIN — SODIUM CHLORIDE 200 MG: 9 INJECTION, SOLUTION INTRAVENOUS at 12:17

## 2019-01-01 RX ADMIN — CEFTRIAXONE SODIUM 1 G: 1 INJECTION, SOLUTION INTRAVENOUS at 21:25

## 2019-01-01 RX ADMIN — POLYETHYLENE GLYCOL 3350 17 G: 17 POWDER, FOR SOLUTION ORAL at 20:30

## 2019-01-01 RX ADMIN — GUAIFENESIN 600 MG: 600 TABLET, EXTENDED RELEASE ORAL at 08:51

## 2019-01-01 RX ADMIN — SODIUM CHLORIDE 250 ML: 900 INJECTION, SOLUTION INTRAVENOUS at 10:59

## 2019-01-01 RX ADMIN — METOPROLOL TARTRATE 37.5 MG: 25 TABLET ORAL at 20:04

## 2019-01-01 RX ADMIN — MORPHINE SULFATE 1 MG: 2 INJECTION, SOLUTION INTRAMUSCULAR; INTRAVENOUS at 10:03

## 2019-01-01 RX ADMIN — DOXYCYCLINE 100 MG: 100 CAPSULE ORAL at 20:28

## 2019-01-01 RX ADMIN — PANTOPRAZOLE SODIUM 40 MG: 40 TABLET, DELAYED RELEASE ORAL at 06:55

## 2019-01-01 RX ADMIN — APIXABAN 5 MG: 5 TABLET, FILM COATED ORAL at 20:28

## 2019-01-01 RX ADMIN — CEFTRIAXONE SODIUM 1 G: 1 INJECTION, SOLUTION INTRAVENOUS at 20:02

## 2019-01-01 RX ADMIN — DOXYCYCLINE 100 MG: 100 INJECTION, POWDER, LYOPHILIZED, FOR SOLUTION INTRAVENOUS at 09:36

## 2019-01-01 RX ADMIN — DOXYCYCLINE 100 MG: 100 INJECTION, POWDER, LYOPHILIZED, FOR SOLUTION INTRAVENOUS at 08:36

## 2019-01-01 RX ADMIN — CEFTRIAXONE SODIUM 1 G: 1 INJECTION, SOLUTION INTRAVENOUS at 20:05

## 2019-01-01 RX ADMIN — GUAIFENESIN 600 MG: 600 TABLET, EXTENDED RELEASE ORAL at 09:55

## 2019-01-01 RX ADMIN — PANTOPRAZOLE SODIUM 40 MG: 40 TABLET, DELAYED RELEASE ORAL at 05:25

## 2019-01-01 RX ADMIN — METOPROLOL TARTRATE 37.5 MG: 25 TABLET ORAL at 09:55

## 2019-01-01 RX ADMIN — IPRATROPIUM BROMIDE AND ALBUTEROL SULFATE 3 ML: 2.5; .5 SOLUTION RESPIRATORY (INHALATION) at 03:30

## 2019-01-01 RX ADMIN — METOPROLOL TARTRATE 37.5 MG: 25 TABLET ORAL at 08:36

## 2019-01-01 RX ADMIN — SENNOSIDES,DOCUSATE SODIUM 2 TABLET: 50; 8.6 TABLET, FILM COATED ORAL at 08:52

## 2019-01-01 RX ADMIN — IPRATROPIUM BROMIDE AND ALBUTEROL SULFATE 3 ML: 2.5; .5 SOLUTION RESPIRATORY (INHALATION) at 12:29

## 2019-01-01 RX ADMIN — APIXABAN 5 MG: 5 TABLET, FILM COATED ORAL at 20:06

## 2019-01-01 RX ADMIN — SENNOSIDES,DOCUSATE SODIUM 2 TABLET: 50; 8.6 TABLET, FILM COATED ORAL at 20:28

## 2019-01-01 RX ADMIN — METOPROLOL TARTRATE 37.5 MG: 25 TABLET ORAL at 19:47

## 2019-01-01 RX ADMIN — DOXYCYCLINE 100 MG: 100 INJECTION, POWDER, LYOPHILIZED, FOR SOLUTION INTRAVENOUS at 22:53

## 2019-01-01 RX ADMIN — POTASSIUM CHLORIDE AND SODIUM CHLORIDE 75 ML/HR: 900; 150 INJECTION, SOLUTION INTRAVENOUS at 14:40

## 2019-01-01 RX ADMIN — APIXABAN 5 MG: 5 TABLET, FILM COATED ORAL at 09:36

## 2019-01-01 RX ADMIN — SENNOSIDES,DOCUSATE SODIUM 2 TABLET: 50; 8.6 TABLET, FILM COATED ORAL at 20:02

## 2019-01-01 RX ADMIN — PANTOPRAZOLE SODIUM 40 MG: 40 TABLET, DELAYED RELEASE ORAL at 06:13

## 2019-01-01 RX ADMIN — GUAIFENESIN 600 MG: 600 TABLET, EXTENDED RELEASE ORAL at 09:36

## 2019-01-01 RX ADMIN — SODIUM CHLORIDE 200 MG: 9 INJECTION, SOLUTION INTRAVENOUS at 11:29

## 2019-01-01 RX ADMIN — POTASSIUM CHLORIDE AND SODIUM CHLORIDE 75 ML/HR: 900; 150 INJECTION, SOLUTION INTRAVENOUS at 21:49

## 2019-01-01 RX ADMIN — APIXABAN 5 MG: 5 TABLET, FILM COATED ORAL at 20:45

## 2019-01-01 RX ADMIN — METOPROLOL TARTRATE 25 MG: 25 TABLET ORAL at 08:38

## 2019-01-01 RX ADMIN — SODIUM CHLORIDE 200 MG: 9 INJECTION, SOLUTION INTRAVENOUS at 10:32

## 2019-01-01 RX ADMIN — DENOSUMAB 120 MG: 120 INJECTION SUBCUTANEOUS at 10:34

## 2019-01-01 RX ADMIN — FUROSEMIDE 40 MG: 10 INJECTION, SOLUTION INTRAMUSCULAR; INTRAVENOUS at 16:26

## 2019-01-01 RX ADMIN — POTASSIUM CHLORIDE AND SODIUM CHLORIDE 50 ML/HR: 900; 150 INJECTION, SOLUTION INTRAVENOUS at 09:41

## 2019-01-01 RX ADMIN — GUAIFENESIN 600 MG: 600 TABLET, EXTENDED RELEASE ORAL at 20:02

## 2019-01-01 RX ADMIN — METOPROLOL TARTRATE 37.5 MG: 25 TABLET ORAL at 20:28

## 2019-01-01 RX ADMIN — SENNOSIDES,DOCUSATE SODIUM 2 TABLET: 50; 8.6 TABLET, FILM COATED ORAL at 20:04

## 2019-01-01 RX ADMIN — AZITHROMYCIN 500 MG: 250 TABLET, FILM COATED ORAL at 23:17

## 2019-01-01 RX ADMIN — GUAIFENESIN 600 MG: 600 TABLET, EXTENDED RELEASE ORAL at 20:06

## 2019-01-01 RX ADMIN — DOXYCYCLINE 100 MG: 100 CAPSULE ORAL at 15:18

## 2019-01-01 RX ADMIN — FLUDEOXYGLUCOSE F18 1 DOSE: 300 INJECTION INTRAVENOUS at 12:45

## 2019-01-01 RX ADMIN — IPRATROPIUM BROMIDE AND ALBUTEROL SULFATE 3 ML: 2.5; .5 SOLUTION RESPIRATORY (INHALATION) at 09:07

## 2019-01-01 RX ADMIN — SODIUM CHLORIDE 250 ML: 9 INJECTION, SOLUTION INTRAVENOUS at 12:16

## 2019-01-01 RX ADMIN — PROPOFOL 200 MG: 10 INJECTION, EMULSION INTRAVENOUS at 09:06

## 2019-01-01 RX ADMIN — DENOSUMAB 120 MG: 120 INJECTION SUBCUTANEOUS at 13:18

## 2019-01-01 RX ADMIN — POLYETHYLENE GLYCOL 3350 17 G: 17 POWDER, FOR SOLUTION ORAL at 09:56

## 2019-01-01 RX ADMIN — DENOSUMAB 120 MG: 120 INJECTION SUBCUTANEOUS at 15:04

## 2019-01-01 RX ADMIN — APIXABAN 5 MG: 5 TABLET, FILM COATED ORAL at 09:56

## 2019-01-01 RX ADMIN — IPRATROPIUM BROMIDE AND ALBUTEROL SULFATE 3 ML: 2.5; .5 SOLUTION RESPIRATORY (INHALATION) at 19:22

## 2019-01-01 RX ADMIN — METHYLPREDNISOLONE SODIUM SUCCINATE 125 MG: 125 INJECTION, POWDER, FOR SOLUTION INTRAMUSCULAR; INTRAVENOUS at 20:35

## 2019-01-01 RX ADMIN — SODIUM CHLORIDE, PRESERVATIVE FREE 10 ML: 5 INJECTION INTRAVENOUS at 20:45

## 2019-01-01 RX ADMIN — LIDOCAINE HYDROCHLORIDE 60 MG: 20 INJECTION, SOLUTION INFILTRATION; PERINEURAL at 09:05

## 2019-01-01 RX ADMIN — SODIUM CHLORIDE 30 ML/HR: 9 INJECTION, SOLUTION INTRAVENOUS at 08:26

## 2019-01-01 RX ADMIN — IPRATROPIUM BROMIDE AND ALBUTEROL SULFATE 3 ML: 2.5; .5 SOLUTION RESPIRATORY (INHALATION) at 06:57

## 2019-01-01 RX ADMIN — SODIUM CHLORIDE 250 ML: 900 INJECTION, SOLUTION INTRAVENOUS at 09:57

## 2019-01-01 RX ADMIN — FUROSEMIDE 40 MG: 10 INJECTION, SOLUTION INTRAMUSCULAR; INTRAVENOUS at 15:30

## 2019-01-01 RX ADMIN — METOPROLOL TARTRATE 37.5 MG: 25 TABLET ORAL at 20:03

## 2019-01-01 RX ADMIN — FLUDEOXYGLUCOSE F18 1 DOSE: 300 INJECTION INTRAVENOUS at 10:01

## 2019-01-01 RX ADMIN — IPRATROPIUM BROMIDE AND ALBUTEROL SULFATE 3 ML: 2.5; .5 SOLUTION RESPIRATORY (INHALATION) at 21:51

## 2019-01-01 RX ADMIN — SENNOSIDES,DOCUSATE SODIUM 2 TABLET: 50; 8.6 TABLET, FILM COATED ORAL at 08:36

## 2019-01-01 RX ADMIN — ACETAMINOPHEN 650 MG: 650 SUPPOSITORY RECTAL at 16:35

## 2019-01-01 RX ADMIN — MORPHINE SULFATE 1 MG: 2 INJECTION, SOLUTION INTRAMUSCULAR; INTRAVENOUS at 11:05

## 2019-01-01 RX ADMIN — GUAIFENESIN 600 MG: 600 TABLET, EXTENDED RELEASE ORAL at 20:45

## 2019-01-01 RX ADMIN — POTASSIUM CHLORIDE 20 MEQ: 750 CAPSULE, EXTENDED RELEASE ORAL at 09:55

## 2019-01-01 RX ADMIN — SODIUM CHLORIDE 200 MG: 9 INJECTION, SOLUTION INTRAVENOUS at 15:16

## 2019-01-01 RX ADMIN — SODIUM CHLORIDE 250 ML: 9 INJECTION, SOLUTION INTRAVENOUS at 15:04

## 2019-01-01 RX ADMIN — GUAIFENESIN 600 MG: 600 TABLET, EXTENDED RELEASE ORAL at 08:38

## 2019-01-01 RX ADMIN — DOXYCYCLINE 100 MG: 100 INJECTION, POWDER, LYOPHILIZED, FOR SOLUTION INTRAVENOUS at 08:38

## 2019-01-01 RX ADMIN — FLUDEOXYGLUCOSE F18 1 DOSE: 300 INJECTION INTRAVENOUS at 09:23

## 2019-01-07 RX ORDER — APIXABAN 5 MG/1
TABLET, FILM COATED ORAL
Qty: 180 TABLET | Refills: 0 | Status: SHIPPED | OUTPATIENT
Start: 2019-01-07 | End: 2019-04-22 | Stop reason: SDUPTHER

## 2019-03-11 ENCOUNTER — CLINICAL SUPPORT NO REQUIREMENTS (OUTPATIENT)
Dept: CARDIOLOGY | Facility: CLINIC | Age: 74
End: 2019-03-11

## 2019-03-11 DIAGNOSIS — I49.5 SICK SINUS SYNDROME (HCC): Primary | ICD-10-CM

## 2019-03-11 PROCEDURE — 93296 REM INTERROG EVL PM/IDS: CPT | Performed by: INTERNAL MEDICINE

## 2019-03-11 PROCEDURE — 93294 REM INTERROG EVL PM/LDLS PM: CPT | Performed by: INTERNAL MEDICINE

## 2019-05-22 PROBLEM — J18.9 PNEUMONIA DUE TO INFECTIOUS ORGANISM: Status: ACTIVE | Noted: 2019-01-01

## 2019-05-24 PROBLEM — Z85.118 HISTORY OF LUNG CANCER: Status: ACTIVE | Noted: 2019-01-01

## 2019-05-29 NOTE — OUTREACH NOTE
Prep Survey      Responses   Facility patient discharged from?  [facility]   Is patient eligible?  Yes   Discharge diagnosis  PNA   Does the patient have one of the following disease processes/diagnoses(primary or secondary)?  COPD/Pneumonia   Does the patient have Home health ordered?  No   Is there a DME ordered?  No   Prep survey completed?  Yes          Rachel Martinez RN

## 2019-05-29 NOTE — TELEPHONE ENCOUNTER
Reviewed Dr. Garrett's note from hospital consult dated 5/24/19 w/ patient. Note indicates PET scan will be needed but will be postponed until patient's pneumonia has resolved. Pt v/u. Pt also asked if nausea is normal w/ antibiotic she is on. She was prescribed doxycycline upon D/C from hospital. Advised patient that nausea isn't unexpected and that taking w/ food can help avoid nausea with that medication, she v/u.    ----- Message from Leonor Manrique sent at 5/29/2019 10:17 AM EDT -----  [phone]  Is there anything in her notes about having a pet scan done.

## 2019-06-03 NOTE — PROGRESS NOTES
Subjective:     Encounter Date:5/21/19      Patient ID: Hui Workman is a 73 y.o. female.    Chief Complaint:  Dizziness   This is a recurrent problem. The current episode started more than 1 month ago. The problem occurs intermittently. The problem has been gradually improving. Pertinent negatives include no chest pain.   Atrial Fibrillation   Presents for follow-up visit. Symptoms include dizziness. Symptoms are negative for an AICD problem, bradycardia, chest pain, hypertension, shortness of breath and syncope. The symptoms have been stable. Past medical history includes atrial fibrillation.       73-year-old female who presents today for reevaluation.  Patient still has episodes of lightheaded and dizziness but overall is been doing relatively well.  Her pacemaker was interrogated today.  She had 654 episodes of atrial tachycardia.  Patient remains anticoagulated she was asymptomatic.  It represented 34% of the time she was going in and out of atrial fibrillation.      Review of Systems   Cardiovascular: Negative for chest pain and syncope.   Respiratory: Negative for shortness of breath.    Neurological: Positive for dizziness.   All other systems reviewed and are negative.      Procedures         Objective:     Physical Exam   Constitutional: She is oriented to person, place, and time. She appears well-developed.   HENT:   Head: Normocephalic.   Eyes: Conjunctivae are normal.   Neck: Normal range of motion.   Cardiovascular: Normal rate and normal heart sounds. An irregularly irregular rhythm present.   Pulmonary/Chest: Breath sounds normal.   Abdominal: Soft. Bowel sounds are normal.   Musculoskeletal: Normal range of motion. She exhibits no edema.   Neurological: She is alert and oriented to person, place, and time.   Skin: Skin is warm and dry.   Psychiatric: She has a normal mood and affect. Her behavior is normal.   Vitals reviewed.      Lab Review:       Assessment:          Diagnosis Plan   1.  Paroxysmal atrial fibrillation (CMS/HCC)     2. Nonrheumatic aortic valve insufficiency            Plan:         1.  Paroxysmal atrial fibrillation with tachybradycardia syndrome.  She continues to go in and out of atrial fibrillation her pacemaker is still functioning nicely.  2.  Hypertension blood pressures good  3.  History of aortic insufficiency clinically stable.  4.  History of small cell lung cancer.  Followed by Dr. Almanzar.  5.  Follow-up 6-12 months sooner if issues.  Overall she is about the same.      Atrial Fibrillation and Atrial Flutter  Assessment  • The patient has paroxysmal atrial fibrillation  • This is non-valvular in etiology  • The patient's CHADS2-VASc score is 2  • A [id number] score of 2 or more is considered a high risk for a thromboembolic event  • Apixaban prescribed    Plan  • Attempt to maintain sinus rhythm  • Continue apixaban for antithrombotic therapy, bleeding issues discussed  • Continue beta blocker for rhythm control

## 2019-06-07 NOTE — PROGRESS NOTES
Subjective .     REASONS FOR FOLLOWUP:    1. History of stage I left breast cancer in 1998, [place] positive, status post mastectomy followed by tamoxifen x 5 years, completed in 07/2003.    2. Limited stage small cell lung cancer with right lower lobe mass, right hilar adenopathy.  Initiation of cisplatin/-16 chemotherapy, cycle 1 initiated 11/17/10.  Concurrent radiation therapy initiated 11/19/10.  Radiation therapy completed 01/11/11.   3. Status post Mediport placement on 12/27/10.  Mediport removed in November 2011.    4. Cycle #6 of cisplatin/-16 chemotherapy initiated 3/1/11.  Complete response seen on subsequent CT scan dated 3/28/11.       5. 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   6. Status post PCI, completed 5-27-11.     7. Right thyroid nodule    HISTORY OF PRESENT ILLNESS:  The patient is a 73 y.o. year old female who is here for follow-up with the above-mentioned history.    History of Present Illness patient returns today in follow-up with laboratory studies and CT results to review following hospitalization 5/22-5/27/2019 for pneumonia.  The patient was felt to have both viral pneumonia from metapneumovirus as well as bacterial pneumonia with a right lower lobe area of consolidation.  She was treated with antibiotics with improvement.  Today, the patient reports that she is nearly back to her baseline level of functioning.  She still has some minimal congestion.  She reports minimal dyspnea on exertion.  She has been using a nebulizer at home 4 times daily.  She does have some intermittent wheezing.  She did see her pulmonologist and there was concern regarding aspiration and they have ordered a swallowing test which has not yet to be performed.  Her family members do note that she has some choking with swallowing liquids.    PAST MEDICAL HISTORY:   1. Chronic obstructive pulmonary disease.    2. Right low thoracic dermatome shingles in 02/12.    3. Cystoscopy  with bladder biopsy in 05/2012.    4. Psoriasis identified in the right heel in 2013.    5. Osteoarthritis.  6. Colonoscopy 8/4/15 with 5 mm benign rectal polyp.  7. Right sided thyroid nodule.  8. Atrial fibrillation on anticoagulation with Eliquis 5 mg twice a day.  Pacemaker placed 3/13/17.    SURGICAL HISTORY:    1. Left mastectomy in 1998, left breast reconstruction and right breast implant placement in 1999.    2. Exploratory laparotomy.   3. Status post pacemaker placement left chest wall 3/13/17.     ONCOLOGIC HISTORY:  (History from previous dates can be found in the separate document.)  Past Medical History:   Diagnosis Date   • Aortic valve insufficiency    • Atrial fibrillation and flutter (CMS/HCC)    • Axillary lump    • Breast cancer (CMS/HCC)     Left, stage I; ER/[place] positive   • Chronic kidney disease     Stage 3   • COPD (chronic obstructive pulmonary disease) (CMS/HCC)    • Dizziness     upon standing   • Fatigue    • Lung cancer (CMS/HCC)    • Neutropenia (CMS/HCC)    • Osteoarthritis    • Paroxysmal atrial fibrillation (CMS/HCC)    • Peripheral neuropathy     Secondary to cisplatin.   • Pneumonia 06/2019   • Psoriasis     Identified in the right heel in 2013.   • Rectal polyp    • Shingles 02/2012    RIGHT LOW THORACIC    • Small cell lung cancer (CMS/HCC)     Limited stage   • Thyroid nodule     RIGHT   • Wheezing      Past Surgical History:   Procedure Laterality Date   • BREAST IMPLANT SURGERY Right 1999   • BREAST RECONSTRUCTION Left 1999   • CARDIAC ELECTROPHYSIOLOGY PROCEDURE N/A 3/13/2017    Procedure: Device Implant  DDD pacer  medtronic;  Surgeon: Armani Kennedy MD;  Location: [facility] INVASIVE LOCATION;  Service:    • CATARACT EXTRACTION Bilateral    • COLONOSCOPY     • CYSTOSCOPY BLADDER BIOPSY  05/2012   • EXPLORATORY LAPAROTOMY      Several years ago.   • MASTECTOMY Left 1998   • MEDIPORT INSERTION, SINGLE     • MEDIPORT REMOVAL Right    • PACEMAKER IMPLANTATION            Current Outpatient Medications on File Prior to Visit   Medication Sig Dispense Refill   • apixaban (ELIQUIS) 5 MG tablet tablet Take 5 mg by mouth 2 (Two) Times a Day.     • Fluticasone-Umeclidin-Vilant (TRELEGY ELLIPTA) 100-62.5-25 MCG/INH aerosol powder  Inhale 1 each Daily.     • furosemide (LASIX) 40 MG tablet Take 40 mg by mouth Daily.     • guaiFENesin (MUCINEX) 600 MG 12 hr tablet Take 1 tablet by mouth Every 12 (Twelve) Hours for 30 days. 60 tablet 0   • HYDROcodone-acetaminophen (NORCO) 5-325 MG per tablet Take 1 tablet by mouth Every 8 (Eight) Hours As Needed for Moderate Pain .     • metoprolol tartrate 37.5 MG tablet Take 37.5 mg by mouth Every 12 (Twelve) Hours for 30 days. 60 tablet 0   • omeprazole (priLOSEC) 40 MG capsule Take 40 mg by mouth Daily.     • polyethylene glycol (MIRALAX) packet Take 17 g by mouth 2 (Two) Times a Day.     • potassium chloride (K-DUR,KLOR-CON) 20 MEQ CR tablet Take 20 mEq by mouth Daily.     • sennosides-docusate sodium (SENOKOT-S) 8.6-50 MG tablet Take 2 tablets by mouth 2 (Two) Times a Day for 30 days. 120 tablet 0     No current facility-administered medications on file prior to visit.        ALLERGIES:     Allergies   Allergen Reactions   • Azithromycin        SOCIAL HISTORY:  .  Drinks approximately three beers every other day.  Long-standing smoking history. No HIV risk factors.  Social History     Socioeconomic History   • Marital status:      Spouse name: Dirk   • Number of children: Not on file   • Years of education: Not on file   • Highest education level: Not on file   Occupational History     Employer: RETIRED   Tobacco Use   • Smoking status: Former Smoker     Start date: 5/26/2011   • Smokeless tobacco: Never Used   • Tobacco comment: quit 6 years ago    //    caffeine use - one soft drink daily    Substance and Sexual Activity   • Alcohol use: No     Comment: Occasional beer, approximately 3 beers every other day.   • Drug use: No    • Sexual activity: Defer         FAMILY HISTORY:  Positive for melanoma in her mother, diagnosed at the age of 70 and  from heart attack at age of 80.  Father had colon cancer at age 70 and  at age of 80 of abdominal carcinomatosis.    Family History   Problem Relation Age of Onset   • Heart disease Mother    • Heart attack Mother    • Melanoma Mother 70   • Colon cancer Father 70   • Cancer Father         Abdominal carcinomatosis   • Stroke Neg Hx        Review of Systems   Constitutional: Positive for fatigue. Negative for activity change, appetite change and unexpected weight change.   HENT: Negative for mouth sores, nosebleeds and voice change.    Eyes: Negative for itching.   Respiratory: Negative for shortness of breath and wheezing.    Cardiovascular: Negative for palpitations and leg swelling.   Gastrointestinal: Positive for constipation. Negative for abdominal distention, blood in stool and diarrhea.   Endocrine: Negative for cold intolerance and heat intolerance.   Genitourinary: Negative for difficulty urinating, dysuria, frequency and hematuria.   Musculoskeletal: Positive for arthralgias, back pain and gait problem. Negative for neck pain.   Neurological: Positive for light-headedness and numbness. Negative for dizziness and syncope.   Hematological: Negative for adenopathy. Does not bruise/bleed easily.   Psychiatric/Behavioral: Negative for confusion and sleep disturbance. The patient is not nervous/anxious.          Objective      Vitals:    19 1215   BP: 112/73   Pulse: 91   Resp: 14   Temp: 97.7 °F (36.5 °C)   SpO2: 95%      Current Status 2019   ECOG score 0   Pain 0/10    Physical Exam   Constitutional: She is oriented to person, place, and time. She appears well-developed and well-nourished.   HENT:   Mouth/Throat: Oropharynx is clear and moist.   Eyes: Conjunctivae are normal.   Neck: No thyromegaly present.   Cardiovascular: Normal rate and regular rhythm. Exam reveals no  gallop and no friction rub.   No murmur heard.  Pulmonary/Chest: Breath sounds normal. No respiratory distress. She has no wheezes. Right breast exhibits no mass. Left breast exhibits no mass.   Right breast with implant in place, no abnormalities identified.  Status post left mastectomy with implant reconstruction, no abnormalities.   Abdominal: Soft. Bowel sounds are normal. She exhibits no distension. There is no tenderness.   Musculoskeletal: She exhibits no edema.   Lymphadenopathy:        Head (right side): No submandibular adenopathy present.     She has no cervical adenopathy.     She has no axillary adenopathy.        Right: No inguinal and no supraclavicular adenopathy present.        Left: No inguinal and no supraclavicular adenopathy present.   Neurological: She is alert and oriented to person, place, and time. She displays normal reflexes. No cranial nerve deficit. She exhibits normal muscle tone.   Skin: Skin is warm and dry. No rash noted.   Psychiatric: She has a normal mood and affect. Her behavior is normal.       RECENT LABS:  Hematology WBC   Date Value Ref Range Status   05/25/2019 5.87 3.40 - 10.80 10*3/mm3 Final     RBC   Date Value Ref Range Status   05/25/2019 3.81 3.77 - 5.28 10*6/mm3 Final     Hemoglobin   Date Value Ref Range Status   05/25/2019 11.6 (L) 12.0 - 15.9 g/dL Final     Hematocrit   Date Value Ref Range Status   05/25/2019 41.3 34.0 - 46.6 % Final     Platelets   Date Value Ref Range Status   05/25/2019 198 140 - 450 10*3/mm3 Final        Lab Results   Component Value Date    GLUCOSE 109 (H) 05/27/2019    BUN 19 05/27/2019    CREATININE 1.02 (H) 05/27/2019    EGFRIFNONA 53 (L) 05/27/2019    BCR 18.6 05/27/2019    CO2 21.1 (L) 05/27/2019    CALCIUM 9.0 05/27/2019    ALBUMIN 3.50 05/23/2019    AST 18 05/23/2019    ALT 19 05/23/2019     Records reviewed from recent hospitalization 5/22-5/27/2019 including progress notes, discharge summary, laboratory studies, and CT scan chest  from 5/23/2019.  I did personally review CT images today.  Discussed below.    Assessment/Plan     1. Limited stage small cell lung cancer: The patient completed concurrent chemoradiation with 6 cycles of cisplatin and -16 in March 2011 and achieved a complete response. She did receive PCI completing this in May 2011    The patient continued with annual low-dose screening CT scan of the chest.  Follow-up study from 6/6/18 showed some new findings from which the patient was asymptomatic.  There was a small to moderate right pleural effusion as well as right pleural thickening and potential lymphadenopathy along the right lower lobe bronchovascular bundle although difficult to identify with certainty.  There was suspicion for potential underlying malignancy. PET scan 6/15/18 showed no significant change in the right pleural effusion with low level activity in the right lower lobe bronchovascular bundle SUV of only 3.7.  There was therefore no definitive suspicious hypermetabolic activity to explain the pleural effusion.  Diagnostic right thoracentesis on 6/20/18 with 250 cc clear yellow fluid removed with LDH 90, pH 7.5, total protein 1.8, appearing transudative with pathology showing no evidence of malignant cells. Fortunately there was no significant evidence of malignancy.  Etiology of the findings and pleural effusion unclear however.  Patient referred back to cardiology and was started by PCP on Lasix 40 mg daily in July 2018.  CT chest 7/13/18 with no change and negative/low probability V/Q scan 7/16/18.    Repeat CT 8/17/18 showed near complete resolution of pleural effusions with only a minimal right pleural effusion remaining and some atelectasis/scarring in the right lower lobe.  Follow-up CT chest 11/30/2018 with probable scar right lower lobe.    Patient returns today in follow-up having recently been hospitalized 5/22-5/27/2019 with suspected viral and bacterial pneumonia.  She did have evidence of  metapneumovirus on respiratory viral panel.  She also had CT chest 5/23/2019 showing increased consolidation right base consistent with infiltrate.  There was evidence of scattered groundglass opacity (likely the viral component).  There was an enlarging nodule however with irregular margins left upper lobe associated with left hilum worrisome for malignancy.  It was recommended to pursue PET scan.  The patient did see her pulmonologist who was concerned about aspiration and is ordered a swallow test.  I share that concern, especially with her family members indicating that she has some episodes of choking with liquids.  We did discuss the CT findings and the need to pursue PET scan imaging.  I would recommend delaying this until she is around 6 weeks out from her pneumonia to allow adequate resolution of any inflammatory changes in the chest.  The patient and her family agree.  In 4 weeks she will undergo PET scan and I will see her back in 5 weeks to review the results.    2. History of stage I breast cancer on the left: The patient underwent a left mastectomy and completed 5 years of tamoxifen in July 2003. Her exam today is negative.  Annual right mammogram 9/5/18 was negative, BI-RADS 1.  We did perform annual breast exam today which was negative.  At her next visit, we will schedule her annual mammogram due in September 2019.  3. Peripheral neuropathy secondary to cisplatin: The patient has continued grade 2 peripheral neuropathy involving her feet. This does affect her balance; it is unchanged.   4. Stage III chronic kidney disease: The patient's creatinine is stable at 1.38 today, baseline in the 1.1-1.2 range..   5. Right thyroid nodule: CT scan of the neck from 05/22/2015 in the [facility] system did identify a right-sided 1.2 cm thyroid nodule of unclear significance and recommended further evaluation with ultrasound if necessary.  An ultrasound was performed 12/10/15 confirming a 1.4 cm solitary solid  nodule in the right thyroid lobe.  A one-year follow-up ultrasound was performed on 12/19/17 showing stability in the right-sided nodule dating back to 2015.  With the length of stability, this is likely a benign nodule.  PET scan was negative in the thyroid on 6/15/18.  6. Atrial fibrillation: The patient does continue on Eliquis.  She required pacemaker placement 3/13/17 after having a syncopal episode.   7. Mild dementia: The patient's short-term memory is very poor.  This has been an ongoing issue for her.  Her scans do show significant progression of small vessel ischemic change.  It is felt that some of this has been precipitated by her previous PCI.  8. Right neck pain: MRI of the cervical spine 12/21/16 showed no evidence of metastatic disease did show degenerative changes and canal narrowing with foraminal narrowing as well, likely the etiology of her pain.  Her pain resolved.  9. Sigmoid colon uptake on PET scan: The PET scan did show activity in the distal sigmoid colon/rectum and the patient was referred back to her gastroenterologist Dr. Colton Watkins.  He did perform a flexible sigmoidoscopy which was negative on 8/2/18 and likely the activity represents muscular activity in the bowel wall.     PLAN:     1. Proceed with swallow study per pulmonary recommendations  2. In 4 weeks PET scan  3. In 5 weeks MD visit with CBC, CMP.

## 2019-07-17 NOTE — PROGRESS NOTES
Subjective .     REASONS FOR FOLLOWUP:    1. History of stage I left breast cancer in 1998, ER/GA positive, status post mastectomy followed by tamoxifen x 5 years, completed in 07/2003.    2. Limited stage small cell lung cancer with right lower lobe mass, right hilar adenopathy.  Initiation of cisplatin/-16 chemotherapy, cycle 1 initiated 11/17/10.  Concurrent radiation therapy initiated 11/19/10.  Radiation therapy completed 01/11/11.   3. Status post Mediport placement on 12/27/10.  Mediport removed in November 2011.    4. Cycle #6 of cisplatin/-16 chemotherapy initiated 3/1/11.  Complete response seen on subsequent CT scan dated 3/28/11.       5. 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   6. Status post PCI, completed 5-27-11.     7. Right thyroid nodule  8. Suspected metastatic non-small cell lung cancer with PET scan 7/11/2019 showing enlarging hypermetabolic left upper lobe/perihilar mass 2.8 cm (SUV 19) with additional 6 mm subpleural left upper lobe lesion (SUV 6.2), right medial 10th rib/costal vertebral activity (SUV 4.5), right iliac 3.5 cm lesion (SUV 11.8), left iliac activity (SUV 5.3).  Activity in distal rectum and collapsed colon of unclear significance.    HISTORY OF PRESENT ILLNESS:  The patient is a 73 y.o. year old female who is here for follow-up with the above-mentioned history.    History of Present Illness the patient returns today in follow-up with PET scan and labs to review after hospitalization in May 2019 when she was admitted for pneumonia.  She was due in the office at that time for follow-up with CT scan of the chest.  She apparently had a syncopal episode and was then admitted with finding of pneumonia.  She had a CT of the head without contrast on 5/23/2019 which was negative except severe chronic ischemic white matter change and atrophy.  CT chest on 5/23/2019 showed chronic consolidation right lung base however there was increasing  consolidation in that region and groundglass infiltrates in both lungs consistent with multifocal pneumonia.  There was also however an enlarging nodule with irregular margins left upper lobe and a perihilar location that was suspicious for malignancy.  Follow-up with PET scan was recommended.  The patient had a respiratory viral panel showing evidence of metapneumovirus and was also treated empirically for bacterial pneumonia.  She did improve.  She was scheduled for a delayed follow-up PET scan allowing time for resolution of her infectious issues.  PET scan was performed on 7/11/2019 and she returns today to follow-up the results.  She reports that her respiratory status has returned to baseline.  She does have chronic dyspnea on exertion however this is improved using an inhaler and nebulizer recently.  She has developed some right-sided hip/iliac pain that has been keeping her up at night.  She does have some mild constipation.  She had been using hydrocodone which was provided previously by pain management however she is no longer seeing that physician.  She has recently switched to Tylenol which is not controlling her pain very well.  She notes a normal appetite.  She denies any weight loss.    PAST MEDICAL HISTORY:   1. Chronic obstructive pulmonary disease.    2. Right low thoracic dermatome shingles in 02/12.    3. Cystoscopy with bladder biopsy in 05/2012.    4. Psoriasis identified in the right heel in 2013.    5. Osteoarthritis.  6. Colonoscopy 8/4/15 with 5 mm benign rectal polyp.  7. Right sided thyroid nodule.  8. Atrial fibrillation on anticoagulation with Eliquis 5 mg twice a day.  Pacemaker placed 3/13/17.    SURGICAL HISTORY:    1. Left mastectomy in 1998, left breast reconstruction and right breast implant placement in 1999.    2. Exploratory laparotomy.   3. Status post pacemaker placement left chest wall 3/13/17.     ONCOLOGIC HISTORY:  (History from previous dates can be found in the separate  document.)  Past Medical History:   Diagnosis Date   • Aortic valve insufficiency    • Atrial fibrillation and flutter (CMS/HCC)    • Axillary lump    • Breast cancer (CMS/HCC)     Left, stage I; ER/AL positive   • Chronic kidney disease     Stage 3   • COPD (chronic obstructive pulmonary disease) (CMS/HCC)    • Dizziness     upon standing   • Fatigue    • Lung cancer (CMS/HCC)    • Neutropenia (CMS/HCC)    • Osteoarthritis    • Paroxysmal atrial fibrillation (CMS/HCC)    • Peripheral neuropathy     Secondary to cisplatin.   • Pneumonia 06/2019   • Psoriasis     Identified in the right heel in 2013.   • Rectal polyp    • Shingles 02/2012    RIGHT LOW THORACIC    • Small cell lung cancer (CMS/HCC)     Limited stage   • Thyroid nodule     RIGHT   • Wheezing      Past Surgical History:   Procedure Laterality Date   • BREAST IMPLANT SURGERY Right 1999   • BREAST RECONSTRUCTION Left 1999   • CARDIAC ELECTROPHYSIOLOGY PROCEDURE N/A 3/13/2017    Procedure: Device Implant  DDD pacer  medtronic;  Surgeon: Armani Kennedy MD;  Location: [facility] INVASIVE LOCATION;  Service:    • CATARACT EXTRACTION Bilateral    • COLONOSCOPY     • CYSTOSCOPY BLADDER BIOPSY  05/2012   • EXPLORATORY LAPAROTOMY      Several years ago.   • MASTECTOMY Left 1998   • MEDIPORT INSERTION, SINGLE     • MEDIPORT REMOVAL Right    • PACEMAKER IMPLANTATION           Current Outpatient Medications on File Prior to Visit   Medication Sig Dispense Refill   • apixaban (ELIQUIS) 5 MG tablet tablet Take 5 mg by mouth 2 (Two) Times a Day.     • bumetanide (BUMEX) 2 MG tablet Take 2 mg by mouth Daily.     • Fluticasone-Umeclidin-Vilant (TRELEGY ELLIPTA) 100-62.5-25 MCG/INH aerosol powder  Inhale 1 each Daily.     • omeprazole (priLOSEC) 40 MG capsule Take 40 mg by mouth Daily.     • polyethylene glycol (MIRALAX) packet Take 17 g by mouth 2 (Two) Times a Day.     • potassium chloride (K-DUR,KLOR-CON) 20 MEQ CR tablet Take 20 mEq by mouth Daily.     •  [DISCONTINUED] furosemide (LASIX) 40 MG tablet Take 40 mg by mouth Daily.     • [DISCONTINUED] HYDROcodone-acetaminophen (NORCO) 5-325 MG per tablet Take 1 tablet by mouth Every 8 (Eight) Hours As Needed for Moderate Pain .       No current facility-administered medications on file prior to visit.        ALLERGIES:     Allergies   Allergen Reactions   • Azithromycin        SOCIAL HISTORY:  .  Drinks approximately three beers every other day.  Long-standing smoking history. No HIV risk factors.  Social History     Socioeconomic History   • Marital status:      Spouse name: Dirk   • Number of children: Not on file   • Years of education: Not on file   • Highest education level: Not on file   Occupational History     Employer: RETIRED   Tobacco Use   • Smoking status: Former Smoker     Start date: 2011   • Smokeless tobacco: Never Used   • Tobacco comment: quit 6 years ago    //    caffeine use - one soft drink daily    Substance and Sexual Activity   • Alcohol use: No     Comment: Occasional beer, approximately 3 beers every other day.   • Drug use: No   • Sexual activity: Defer         FAMILY HISTORY:  Positive for melanoma in her mother, diagnosed at the age of 70 and  from heart attack at age of 80.  Father had colon cancer at age 70 and  at age of 80 of abdominal carcinomatosis.    Family History   Problem Relation Age of Onset   • Heart disease Mother    • Heart attack Mother    • Melanoma Mother 70   • Colon cancer Father 70   • Cancer Father         Abdominal carcinomatosis   • Stroke Neg Hx        Review of Systems   Constitutional: Positive for fatigue. Negative for activity change, appetite change and unexpected weight change.   HENT: Negative for mouth sores, nosebleeds and voice change.    Eyes: Negative for itching.   Respiratory: Positive for shortness of breath. Negative for wheezing.    Cardiovascular: Negative for palpitations and leg swelling.   Gastrointestinal:  Positive for constipation. Negative for abdominal distention, blood in stool and diarrhea.   Endocrine: Negative for cold intolerance and heat intolerance.   Genitourinary: Negative for difficulty urinating, dysuria, frequency and hematuria.   Musculoskeletal: Positive for arthralgias, back pain and gait problem. Negative for neck pain.   Neurological: Negative for dizziness, syncope, light-headedness and numbness.   Hematological: Negative for adenopathy. Does not bruise/bleed easily.   Psychiatric/Behavioral: Negative for confusion and sleep disturbance. The patient is not nervous/anxious.          Objective      Vitals:    07/17/19 1025   BP: 94/67   Pulse: 98   Resp: 14   Temp: 97.8 °F (36.6 °C)   SpO2: 94%      Current Status 7/17/2019   ECOG score 0   Pain 10/10    Physical Exam   Constitutional: She is oriented to person, place, and time. She appears well-developed and well-nourished.   HENT:   Mouth/Throat: Oropharynx is clear and moist.   Eyes: Conjunctivae are normal.   Neck: No thyromegaly present.   Cardiovascular: Normal rate and regular rhythm. Exam reveals no gallop and no friction rub.   No murmur heard.  Pulmonary/Chest: No respiratory distress. She has no wheezes. Right breast exhibits no mass. Left breast exhibits no mass.   Decrease in breath sounds bilaterally   Abdominal: Soft. Bowel sounds are normal. She exhibits no distension. There is no tenderness.   Musculoskeletal: She exhibits no edema.   Lymphadenopathy:        Head (right side): No submandibular adenopathy present.     She has no cervical adenopathy.     She has no axillary adenopathy.        Right: No inguinal and no supraclavicular adenopathy present.        Left: No inguinal and no supraclavicular adenopathy present.   Neurological: She is alert and oriented to person, place, and time. She displays normal reflexes. No cranial nerve deficit. She exhibits normal muscle tone.   Skin: Skin is warm and dry. No rash noted.   Psychiatric:  She has a normal mood and affect. Her behavior is normal.       RECENT LABS:  Hematology WBC   Date Value Ref Range Status   07/17/2019 6.63 3.40 - 10.80 10*3/mm3 Final     RBC   Date Value Ref Range Status   07/17/2019 4.41 3.77 - 5.28 10*6/mm3 Final     Hemoglobin   Date Value Ref Range Status   07/17/2019 13.0 12.0 - 15.9 g/dL Final     Hematocrit   Date Value Ref Range Status   07/17/2019 40.7 34.0 - 46.6 % Final     Platelets   Date Value Ref Range Status   07/17/2019 313 140 - 450 10*3/mm3 Final        Lab Results   Component Value Date    GLUCOSE 140 (H) 07/17/2019    BUN 24 (H) 07/17/2019    CREATININE 1.37 (H) 07/17/2019    EGFRIFNONA 38 (L) 07/17/2019    BCR 17.5 07/17/2019    CO2 23.9 07/17/2019    CALCIUM 10.0 07/17/2019    ALBUMIN 4.30 07/17/2019    AST 13 07/17/2019    ALT 10 07/17/2019     PET scan 7/11/2019: I did personally review PET scan images today.  IMPRESSION:  1. Significant interval increase in size of the 2.8 cm irregular  perihilar nodule at the left upper lobe and the lesion is intensely  hypermetabolic. There is also a hypermetabolic 6 mm pleural-based nodule  at the left upper lobe which is also enlarged in the interval. There is  no hypermetabolic mediastinal lymphadenopathy and there are no  suspicious right pulmonary lesions.  2. There are hypermetabolic bone metastases within the pelvic bones,  most prominent within the right iliac bone. The largest lesion is at the  right iliac body and has a subtle lucent appearance on the corresponding  CT sequence. There is also a tiny hypermetabolic focus at the posterior  medial aspect of the right 10th rib adjacent to the costovertebral  articulation which is suspicious for a tiny bone metastasis.  3. There is hypermetabolic activity throughout most of the colon and  rectum. The appearance of the colon suggests chronic inflammation, but  there is no evidence for acute inflammation or acute colitis. The distal  aspect of the rectum appears  thickened. This may be artifactual and  related to the collapsed state and redundancy. There is no perirectal  lymphadenopathy, but a rectal malignancy cannot be excluded. If the  patient has not had a colonoscopy within the last 12 months, endoscopy  of the rectum is recommended.        Assessment/Plan     1. Limited stage small cell lung cancer: The patient completed concurrent chemoradiation with 6 cycles of cisplatin and -16 in March 2011 and achieved a complete response. She did receive PCI completing this in May 2011    The patient continued with annual low-dose screening CT scan of the chest.  Follow-up study from 6/6/18 showed some new findings from which the patient was asymptomatic.  There was a small to moderate right pleural effusion as well as right pleural thickening and potential lymphadenopathy along the right lower lobe bronchovascular bundle although difficult to identify with certainty.  There was suspicion for potential underlying malignancy. PET scan 6/15/18 showed no significant change in the right pleural effusion with low level activity in the right lower lobe bronchovascular bundle SUV of only 3.7.  There was therefore no definitive suspicious hypermetabolic activity to explain the pleural effusion.  Diagnostic right thoracentesis on 6/20/18 with 250 cc clear yellow fluid removed with LDH 90, pH 7.5, total protein 1.8, appearing transudative with pathology showing no evidence of malignant cells. Fortunately there was no significant evidence of malignancy.  Etiology of the findings and pleural effusion unclear however.  Patient referred back to cardiology and was started by PCP on Lasix 40 mg daily in July 2018.  CT chest 7/13/18 with no change and negative/low probability V/Q scan 7/16/18.    Repeat CT 8/17/18 showed near complete resolution of pleural effusions with only a minimal right pleural effusion remaining and some atelectasis/scarring in the right lower lobe.  Follow-up CT chest  11/30/2018 with probable scar right lower lobe.    Patient was hospitalized 5/22-5/27/2019 with suspected viral and bacterial pneumonia.  She did have evidence of metapneumovirus on respiratory viral panel.  She also had CT chest 5/23/2019 showing increased consolidation right base consistent with infiltrate.  There was evidence of scattered groundglass opacity (likely the viral component).  There was an enlarging nodule however with irregular margins left upper lobe associated with left hilum worrisome for malignancy.  It was recommended to pursue PET scan.  PET scan delayed to allow time for resolution of infectious issues.    The patient returns today in follow-up with PET scan to review from 7/11/2019.  Unfortunately the PET scan shows evidence of metastatic malignancy.  I reviewed the scan findings at length with the patient and her family.  The enlarging lesion in the left upper lobe 2.8 cm has intense activity SUV 19.  The 6 mm pleural-based nodule in the left upper lobe has an SUV of 6.2.  There is activity around the costovertebral junction at the medial right 10th rib with SUV 4.5 but no corresponding lesion on CT.  3.5 cm right iliac lesion with SUV 11.8 suspicious for metastatic disease to bone.  Additional hypermetabolic lesion left iliac body SUV 5.3 just superior to the acetabulum.  There was again activity around the distal rectum SUV 15.9 and activity in the colon suggestive of inflammatory change or colitis.  The findings are very suspicious for metastatic non-small cell lung cancer.  Given the length of time out from her previous small cell malignancy this is likely not related to that disease.  We discussed the need to obtain tissue diagnosis.  She did undergo CT head that was negative on 5/22/2019 without contrast.  Unfortunately she has a pacemaker in place and also has renal insufficiency  The best we can do at this point is to obtain CT of the head again to evaluate for CNS metastasis.   Hopefully this is not the case as she has already received whole brain radiation in the past.  I did contact Dr. Johnathan Elliott regarding potential accessibility of the lesion on PET scan with bronchoscopy/EBUS.  He felt the lesion may be difficult however would attempt the procedure as this would pose less risk to her than a CT-guided biopsy in terms of pneumothorax.  The right iliac lesion could be biopsied however decalcification would impact the ability to pursue molecular testing.  Dr. Elliott will see the patient tomorrow and hopefully proceed with the procedure next week.  If adequate tissue is not obtained we will need to consider possibility of CT-guided lung biopsy or right iliac biopsy.  I do have concerns regarding the patient's overall clinical status and ability to tolerate systemic therapy.  We will discuss this further once a tissue diagnosis is obtained.  We did discuss the colonic findings.  She underwent flexible sigmoidoscopy with Dr. Watkins on 8/2/2018 with no significant findings.  I did suggest that she return to see him to review the findings and consider whether any additional endoscopic evaluation is warranted.  I will see the patient back in approximately 2 weeks to review results from her bronchoscopy/EBUS and CT head.  Once tissue diagnosis obtained, we will consider whether palliative radiation should be pursued to the right iliac crest lesion which is painful.    2. Cancer related pain: The patient is experiencing pain around the right iliac crest.  This is different than her previous back pain.  There is a lesion on PET scan corresponding to the area of her pain consistent with metastatic focus.  The patient has been using Tylenol recently which has not been effective in controlling her pain.  Is awakening her at night.  I will prescribe hydrocodone 5/325 every 4-6 hours as needed, #60.  Patient will begin Senokot 1 p.o. twice daily and increase to 2 p.o. twice daily and add MiraLAX if  needed.  3. History of stage I breast cancer on the left: The patient underwent a left mastectomy and completed 5 years of tamoxifen in July 2003.  Exam on 6/8/2019 was negative.  Annual right mammogram 9/5/18 was negative, BI-RADS 1.    4. Peripheral neuropathy secondary to cisplatin: The patient has continued grade 2 peripheral neuropathy involving her feet. This does affect her balance; it is unchanged.   5. Stage III chronic kidney disease: The patient's creatinine is 1.37 today, baseline in the 1.1-1.2 range..   6. Right thyroid nodule: CT scan of the neck from 05/22/2015 in the [facility] system did identify a right-sided 1.2 cm thyroid nodule of unclear significance and recommended further evaluation with ultrasound if necessary.  An ultrasound was performed 12/10/15 confirming a 1.4 cm solitary solid nodule in the right thyroid lobe.  A one-year follow-up ultrasound was performed on 12/19/17 showing stability in the right-sided nodule dating back to 2015.  With the length of stability, this is likely a benign nodule.  PET scan was negative in the thyroid on 6/15/18 and again 7/11/2019.  7. Atrial fibrillation: The patient does continue on Eliquis.  She required pacemaker placement 3/13/17 after having a syncopal episode.   8. Mild dementia: The patient's short-term memory is very poor.  This has been an ongoing issue for her.  Her scans do show significant progression of small vessel ischemic change.  It is felt that some of this has been precipitated by her previous PCI.  9. Sigmoid colon uptake on PET scan: PET scan 6/15/2018 did show activity in the distal sigmoid colon/rectum and the patient was referred back to her gastroenterologist Dr. Colton Watkins.  He did perform a flexible sigmoidoscopy which was negative on 8/2/18 and likely the activity represents muscular activity in the bowel wall.  Subsequent PET scan 7/11/2019 shows continued activity in the distal rectum SUV 15.9.  There was also activity  throughout the colon, possibly suggestive of inflammation/colitis.  Patient relatively asymptomatic from GI standpoint.  We will ask Dr. Watkins to review PET scan findings to determine whether any additional endoscopic evaluation should be performed.    PLAN:     1. The patient will see Dr. Johnathan Elliott tomorrow with potential plans for bronchoscopy/EBUS next week for tissue diagnosis of suspected malignancy involving left perihilar mass.  Patient will need to hold anticoagulation prior to procedure.  2. Repeat CT head  3. Patient will see Dr. Watkins in GI to determine any further need for endoscopic evaluation of PET findings in the colon.  4. Prescription sent for hydrocodone 5/325 #60  5. Begin Senokot 1 p.o. twice daily  6. MD visit in 2 weeks with CBC, CMP.

## 2019-07-22 NOTE — ANESTHESIA PREPROCEDURE EVALUATION
Anesthesia Evaluation     Patient summary reviewed and Nursing notes reviewed   no history of anesthetic complications:  NPO Solid Status: > 8 hours  NPO Liquid Status: > 8 hours           Airway   Mallampati: II  TM distance: >3 FB  Neck ROM: full  No difficulty expected  Dental      Pulmonary    (+) pneumonia , lung cancer, COPD,   (-) rhonchi, decreased breath sounds, wheezes, not a smoker  Cardiovascular   Exercise tolerance: good (4-7 METS)    PT is on anticoagulation therapy  Rhythm: regular  Rate: normal    (+) pacemaker pacemaker interrogated 3-6 months ago, dysrhythmias Atrial Fib,   (-) hypertension, CAD, angina, LYONS, murmur    ROS comment: medtronic pacer for SSS    Neuro/Psych  (+) dizziness/light headedness, numbness,     (-) CVA    ROS Comment: Neuropathy from chemotherapy  GI/Hepatic/Renal/Endo    (-) no renal disease, diabetes    Musculoskeletal     (+) neck pain,   Abdominal     Abdomen: soft.   Substance History      OB/GYN          Other   (+) arthritis   history of cancer                  Anesthesia Plan    ASA 3     general     intravenous induction   Anesthetic plan, all risks, benefits, and alternatives have been provided, discussed and informed consent has been obtained with: patient.

## 2019-07-22 NOTE — ANESTHESIA PROCEDURE NOTES
Airway  Urgency: elective    Date/Time: 7/22/2019 9:07 AM  End Time:7/22/2019 9:08 AM  Airway not difficult    General Information and Staff    Patient location during procedure: OR  Anesthesiologist: Cornelio Ocampo MD    Indications and Patient Condition  Indications for airway management: airway protection    Preoxygenated: yes  Mask difficulty assessment: 1 - vent by mask    Final Airway Details  Final airway type: supraglottic airway      Successful airway: LMA  Size 4    Number of attempts at approach: 1

## 2019-07-22 NOTE — BRIEF OP NOTE
BRONCHOSCOPY WITH ENDOBRONCHIAL ULTRASOUND  Progress Note    Hui Workman  7/22/2019    Pre-op Diagnosis:   Lung nodule       Post-Op Diagnosis Codes:   Lung nodule    Procedure/CPT® Codes:      Procedure(s):  BRONCHOSCOPY WITH ENDOBRONCHIAL ULTRASOUND    Surgeon(s):  Johnathan Elliott MD    Anesthesia: Monitor Anesthesia Care    Staff:   Endo Technician: Keshav Agrawal  Endo Nurse: Yeimi Morris RN; Kellen Omer RN    Estimated Blood Loss: none    Urine Voided: * No values recorded between 7/22/2019  8:53 AM and 7/22/2019  8:59 AM *    Specimens:                BAL, brushings        Drains:  none    Findings: No endobronchial lesion    Complications: none      Johnathan Elliott MD     Date: 7/22/2019  Time: 8:59 AM

## 2019-07-22 NOTE — ANESTHESIA POSTPROCEDURE EVALUATION
Patient: Hui Workman    Procedure Summary     Date:  07/22/19 Room / Location:   [facility]    Anesthesia Start:  0856 Anesthesia Stop:  0945    Procedure:  BRONCHOSCOPY WITH ENDOBRONCHIAL ULTRASOUND WITH BAL AND BRUSHINGS (N/A Bronchus) Diagnosis:      Surgeon:  Johnathan Elliott MD Provider:  Cornelio Ocampo MD    Anesthesia Type:  general ASA Status:  3          Anesthesia Type: general  Last vitals  BP   118/72 (07/22/19 1019)   Temp   36.6 °C (97.9 °F) (07/22/19 0812)   Pulse   83 (07/22/19 1019)   Resp   17 (07/22/19 1019)     SpO2   100 % (07/22/19 1019)     Post Anesthesia Care and Evaluation    Patient location during evaluation: bedside  Patient participation: complete - patient participated  Level of consciousness: awake and alert  Pain score: 0  Pain management: adequate  Airway patency: patent  Anesthetic complications: No anesthetic complications  PONV Status: none  Cardiovascular status: acceptable  Respiratory status: acceptable  Hydration status: acceptable  Post Neuraxial Block status: Motor and sensory function returned to baseline

## 2019-07-22 NOTE — DISCHARGE INSTRUCTIONS
Dr. Johnathan Panchal    [phone]    Flexible Bronchoscopy, Care After    This sheet gives you information about how to care for yourself after your test. Your doctor may also give you more specific instructions. If you have problems or questions, contact your doctor.  Follow these instructions at home:  Eating and drinking  · Do not eat or drink anything (not even water) for 2 hours after your test, or until your numbing medicine (local anesthetic) wears off.  · When your numbness is gone and your cough and gag reflexes have come back, you may:  ? Eat only soft foods.  ? Slowly drink liquids.  · The day after the test, go back to your normal diet.  Driving  · Do not drive for 24 hours if you were given a medicine to help you relax (sedative).  · Do not drive or use heavy machinery while taking prescription pain medicine.  General instructions  · Take over-the-counter and prescription medicines only as told by your doctor.  · Return to your normal activities as told. Ask what activities are safe for you.  · Do not use any products that have nicotine or tobacco in them. This includes cigarettes and e-cigarettes. If you need help quitting, ask your doctor.  · Keep all follow-up visits as told by your doctor. This is important. It is very important if you had a tissue sample (biopsy) taken.  Get help right away if:  · You have shortness of breath that gets worse.  · You get light-headed.  · You feel like you are going to pass out (faint).  · You have chest pain.  · You cough up:  ? More than a little blood.  ? More blood than before.  Summary  · Do not eat or drink anything (not even water) for 2 hours after your test, or until your numbing medicine wears off.  · Do not use cigarettes. Do not use e-cigarettes.  · Get help right away if you have chest pain.  This information is not intended to replace advice given to you by your health care provider. Make sure you discuss any questions you have with your health care  provider.  Document Released: 10/15/2010 Document Revised: 01/05/2018 Document Reviewed: 01/05/2018  [email or web address] Interactive Patient Education © 2019 [employer]

## 2019-07-26 NOTE — TELEPHONE ENCOUNTER
Notified patient and she v/u     ----- Message from Cornelio Almanzar Jr., MD sent at 7/26/2019  3:32 PM EDT -----  Please notify patient that CT head showed no evidence of metastatic disease  ----- Message -----  From: Interface, Rad Results Rank[place] In  Sent: 7/26/2019   2:44 PM  To: Cornelio Almanzar Jr., MD

## 2019-08-01 NOTE — NURSING NOTE
Back from first CXR, Dr. Rowell to read. VSS. Still c/o pain but appears in no distress. Medicated as ordered. Drinking water.

## 2019-08-01 NOTE — NURSING NOTE
Verbal and written D/C instructions given to patient and family.  They voice understanding and are able to teach back D/C instructions.

## 2019-08-01 NOTE — NURSING NOTE
Returned from CT. Dressing to left mid dry and intact. C/o pain at puncture site, 10/10. No acute distress noted. Order noted.

## 2019-08-01 NOTE — DISCHARGE INSTRUCTIONS
EDUCATION /DISCHARGE INSTRUCTIONS  CT/US guided biopsy:  A biopsy is a procedure done to remove tissue for further analysis.  Before images are taken to locate the target area.  Images can be obtained using ultrasound, CT or MRI.  A physician will clean your skin with antiseptic soap, place a sterile towel around the site and administer a local anesthetic to numb the area.  The physician will then insert a special needle.  Sometimes images are taken of the needle after it is inserted to ensure the needle is in the correct area to be biopsied.   A sample is obtained and sent to the laboratory for study.  Occasionally the laboratory is unable to make a diagnosis from the sample and the procedure may need to be repeated.  Within a week the radiologist will send a report to your physician.  A pathologist will also examine the tissue and send a report.    Risks of the procedure include but are not limited to:   *  Bleeding    *  Infection   *  Puncture of surrounding organs *  Death     *  Lung collapse if the biopsy is near the chest which may require insertion of a      chest tube to re-inflate the lung if severe.    Benefits of the procedure:  Using x-ray helps to locate the area that requires a biopsy. The procedure is less invasive than a surgical procedure, there are no large incisions and it does not require anesthesia.    Alternatives to the procedure:  A biopsy can be performed surgically.  Risks of a surgical biopsy include exposure to anesthesia, infection, excessive bleeding and injury to abdominal organs.  A benefit of surgical biopsy is the ability to see the area to be biopsied and remove of a larger piece of tissue.    THIS EDUCATION INFORMATION WAS REVIEWED PRIOR TO PROCEDURE AND CONSENT. Patient initials__________________Time___0838________________    Post Procedure:    *  Expect the biopsy site may be tender up to one week.    *  Rest today (no pushing pulling or straining).   *  Slowly increase  activity tomorrow.    *  If you received sedation do not drive for 24 hours.   *  Keep dressing clean and dry.   *  Leave dressing on puncture site for 24 hours.    *  You may shower when dressing removed.  Call your doctor if experiencing:   *  Signs of infection such as redness, swelling, excessive pain and / or foul        smelling drainage from the puncture site.   *  Chills or fever over 101 degrees (by mouth).   *  Unrelieved pain.   *  Any new or severe symptoms.   *  If experiencing sudden / severe shortness of breath or chest pain go to the       nearest emergency room.   Following the procedure:     Follow-up with the ordering physician as directed.    Continue to take other medications as directed by your physician unless    otherwise instructed.   If applicable, resume taking your blood thinners (Eliquis) or Aspirin on _8/2/19 after 1100__________.    If you have any concerns please call the Radiology Nurses Desk at [phone].  You are the most important factor in your recovery.  Follow the above instructions carefully.

## 2019-08-01 NOTE — NURSING NOTE
Encounter addended by: Neetu Chapin MD on: 10/4/2018 12:09 PM<BR>    Actions taken: Sign clinical note In  X ray triage for CT guided left lung biopsy.

## 2019-08-07 PROBLEM — C79.51 BONE METASTASIS: Status: ACTIVE | Noted: 2019-01-01

## 2019-08-07 NOTE — PROGRESS NOTES
Subjective .     REASONS FOR FOLLOWUP:    1. History of stage I left breast cancer in 1998, ER/AL positive, status post mastectomy followed by tamoxifen x 5 years, completed in 07/2003.    2. Limited stage small cell lung cancer with right lower lobe mass, right hilar adenopathy.  Initiation of cisplatin/-16 chemotherapy, cycle 1 initiated 11/17/10.  Concurrent radiation therapy initiated 11/19/10.  Radiation therapy completed 01/11/11.   3. Status post Mediport placement on 12/27/10.  Mediport removed in November 2011.    4. Cycle #6 of cisplatin/-16 chemotherapy initiated 3/1/11.  Complete response seen on subsequent CT scan dated 3/28/11.       5. 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   6. Status post PCI, completed 5-27-11.     7. Right thyroid nodule  8. Cancer related pain receiving hydrocodone 5/325  9. Suspected metastatic non-small cell lung cancer:  · PET scan 7/11/2019 showing enlarging hypermetabolic left upper lobe/perihilar mass 2.8 cm (SUV 19) with additional 6 mm subpleural left upper lobe lesion (SUV 6.2), right medial 10th rib/costal vertebral activity (SUV 4.5), right iliac 3.5 cm lesion (SUV 11.8), left iliac activity (SUV 5.3).  Activity in distal rectum and collapsed colon of unclear significance.   · CT head (unable to obtain MRI) negative for metastatic disease on 7/24/2019  · Bronchoscopy/EBUS 7/22/2019 unable to reach perihilar mass.  Left upper lobe brushings/washings negative for malignancy.    · CT-guided biopsy left upper lobe mass 8/1/2019- for malignancy.    · Plan CT-guided biopsy presumed metastatic right iliac lesion.    HISTORY OF PRESENT ILLNESS:  The patient is a 73 y.o. year old female who is here for follow-up with the above-mentioned history.    History of Present Illness patient returns today in follow-up having undergone bronchoscopy/EBUS on 7/22/2019.  Unfortunately, the scope was not able to approximate the perihilar lesion on the  left side.  Left upper lobe bronchial washings and brushings were obtained which were negative for malignancy.  We did subsequently pursue CT-guided biopsy of the left lung mass on 8/1/2019 which was nondiagnostic, no evidence of malignancy.  In the meantime, the patient has experienced worsening pain around the right iliac presumed metastatic lesion.  This is now intermittently radiating down her right leg and is causing some limitation in her mobility.  She is in a wheelchair today.  She has not however been maximizing her narcotic pain medication, taking hydrocodone 5/325 only twice per day.  She has not been using any Senokot or MiraLAX, notes mild constipation.  She notes continued mild fatigue.  She did undergo CT head in the interval on 7/24/2019 which was negative for malignancy.    PAST MEDICAL HISTORY:   1. Chronic obstructive pulmonary disease.    2. Right low thoracic dermatome shingles in 02/12.    3. Cystoscopy with bladder biopsy in 05/2012.    4. Psoriasis identified in the right heel in 2013.    5. Osteoarthritis.  6. Colonoscopy 8/4/15 with 5 mm benign rectal polyp.  7. Right sided thyroid nodule.  8. Atrial fibrillation on anticoagulation with Eliquis 5 mg twice a day.  Pacemaker placed 3/13/17.    SURGICAL HISTORY:    1. Left mastectomy in 1998, left breast reconstruction and right breast implant placement in 1999.    2. Exploratory laparotomy.   3. Status post pacemaker placement left chest wall 3/13/17.     ONCOLOGIC HISTORY:  (History from previous dates can be found in the separate document.)  Past Medical History:   Diagnosis Date   • Aortic valve insufficiency    • Atrial fibrillation and flutter (CMS/HCC)    • Axillary lump    • Breast cancer (CMS/HCC)     Left, stage I; ER/OR positive   • Chronic kidney disease     Stage 3   • COPD (chronic obstructive pulmonary disease) (CMS/HCC)    • Dizziness     upon standing   • Fatigue    • Lung cancer (CMS/HCC)    • Neutropenia (CMS/HCC)    •  Osteoarthritis    • Paroxysmal atrial fibrillation (CMS/HCC)    • Peripheral neuropathy     Secondary to cisplatin.   • Pneumonia 06/2019   • Psoriasis     Identified in the right heel in 2013.   • Rectal polyp    • Shingles 02/2012    RIGHT LOW THORACIC    • Small cell lung cancer (CMS/HCC)     Limited stage   • Thyroid nodule     RIGHT   • Wheezing      Past Surgical History:   Procedure Laterality Date   • BREAST IMPLANT SURGERY Right 1999   • BREAST RECONSTRUCTION Left 1999   • BRONCHOSCOPY N/A 7/22/2019    Procedure: BRONCHOSCOPY WITH ENDOBRONCHIAL ULTRASOUND WITH BAL AND BRUSHINGS;  Surgeon: Johnathan Elliott MD;  [place] [facility] [place];  Service: Pulmonary   • CARDIAC ELECTROPHYSIOLOGY PROCEDURE N/A 3/13/2017    Procedure: Device Implant  DDD pacer  medtronic;  Surgeon: Armani Kennedy MD;  [place] [facility] [place] [facility] [place];  Service:    • CATARACT EXTRACTION Bilateral    • COLONOSCOPY     • CYSTOSCOPY BLADDER BIOPSY  05/2012   • EXPLORATORY LAPAROTOMY      Several years ago.   • MASTECTOMY Left 1998   • MEDIPORT INSERTION, SINGLE     • MEDIPORT REMOVAL Right    • PACEMAKER IMPLANTATION           Current Outpatient Medications on File Prior to Visit   Medication Sig Dispense Refill   • albuterol (PROVENTIL) (5 MG/ML) 0.5% nebulizer solution Take 2.5 mg by nebulization Every 6 (Six) Hours As Needed for Wheezing.     • apixaban (ELIQUIS) 5 MG tablet tablet Take 5 mg by mouth 2 (Two) Times a Day.     • bumetanide (BUMEX) 2 MG tablet Take 2 mg by mouth Daily.     • Fluticasone-Umeclidin-Vilant (TRELEGY ELLIPTA) 100-62.5-25 MCG/INH aerosol powder  Inhale 1 each Daily.     • metoprolol tartrate (LOPRESSOR) 25 MG tablet Take 37.5 mg by mouth 2 (Two) Times a Day.     • omeprazole (priLOSEC) 40 MG capsule Take 40 mg by mouth Daily.     • polyethylene glycol (MIRALAX) packet Take 17 g by mouth 2 (Two) Times a Day.     • potassium chloride (K-DUR,KLOR-CON) 20 MEQ CR tablet Take 20 mEq by mouth Daily.      • [DISCONTINUED] HYDROcodone-acetaminophen (NORCO) 5-325 MG per tablet Take 1 tablet by mouth Every 6 (Six) Hours As Needed for Moderate Pain . 60 tablet 0     No current facility-administered medications on file prior to visit.        ALLERGIES:     Allergies   Allergen Reactions   • Moxifloxacin Nausea Only     Passed out after taking       SOCIAL HISTORY:  .  Drinks approximately three beers every other day.  Long-standing smoking history. No HIV risk factors.  Social History     Socioeconomic History   • Marital status:      Spouse name: Dirk   • Number of children: Not on file   • Years of education: Not on file   • Highest education level: Not on file   Occupational History     Employer: RETIRED   Tobacco Use   • Smoking status: Former Smoker     Start date: 2011   • Smokeless tobacco: Never Used   • Tobacco comment: quit 6 years ago    //    caffeine use - one soft drink daily    Substance and Sexual Activity   • Alcohol use: No     Comment: Occasional beer, approximately 3 beers every other day.   • Drug use: No   • Sexual activity: Defer         FAMILY HISTORY:  Positive for melanoma in her mother, diagnosed at the age of 70 and  from heart attack at age of 80.  Father had colon cancer at age 70 and  at age of 80 of abdominal carcinomatosis.    Family History   Problem Relation Age of Onset   • Heart disease Mother    • Heart attack Mother    • Melanoma Mother 70   • Colon cancer Father 70   • Cancer Father         Abdominal carcinomatosis   • Stroke Neg Hx        Review of Systems   Constitutional: Positive for activity change and fatigue. Negative for appetite change and unexpected weight change.   HENT: Negative for mouth sores, nosebleeds and voice change.    Eyes: Negative for itching.   Respiratory: Negative for shortness of breath and wheezing.    Cardiovascular: Negative for palpitations and leg swelling.   Gastrointestinal: Negative for abdominal distention, blood  in stool, constipation and diarrhea.   Endocrine: Negative for cold intolerance and heat intolerance.   Genitourinary: Negative for difficulty urinating, dysuria, frequency and hematuria.   Musculoskeletal: Positive for arthralgias, back pain and gait problem. Negative for neck pain.   Neurological: Positive for weakness. Negative for dizziness, syncope, light-headedness and numbness.   Hematological: Negative for adenopathy. Does not bruise/bleed easily.   Psychiatric/Behavioral: Negative for confusion and sleep disturbance. The patient is not nervous/anxious.          Objective      Vitals:    08/07/19 1512   BP: 113/74   Pulse: 84   Resp: 16   Temp: 97.6 °F (36.4 °C)   SpO2: 93%      Current Status 8/7/2019   ECOG score 3   Pain 10/10    Physical Exam   Constitutional: She is oriented to person, place, and time. She appears well-developed and well-nourished.   Elderly female no distress seated in a wheelchair   HENT:   Mouth/Throat: Oropharynx is clear and moist.   Eyes: Conjunctivae are normal.   Neck: No thyromegaly present.   Cardiovascular: Normal rate and regular rhythm. Exam reveals no gallop and no friction rub.   No murmur heard.  Pulmonary/Chest: No respiratory distress. She has wheezes. Right breast exhibits no mass. Left breast exhibits no mass.   Scattered bibasilar crackles and scattered wheezes   Abdominal: Soft. Bowel sounds are normal. She exhibits no distension. There is no tenderness.   Musculoskeletal: She exhibits no edema.   Lymphadenopathy:        Head (right side): No submandibular adenopathy present.     She has no cervical adenopathy.     She has no axillary adenopathy.        Right: No inguinal and no supraclavicular adenopathy present.        Left: No inguinal and no supraclavicular adenopathy present.   Neurological: She is alert and oriented to person, place, and time. She displays normal reflexes. No cranial nerve deficit. She exhibits normal muscle tone.   Skin: Skin is warm and  dry. No rash noted.   Psychiatric: She has a normal mood and affect. Her behavior is normal.       RECENT LABS:  Hematology WBC   Date Value Ref Range Status   08/07/2019 8.45 3.40 - 10.80 10*3/mm3 Final     RBC   Date Value Ref Range Status   08/07/2019 3.98 3.77 - 5.28 10*6/mm3 Final     Hemoglobin   Date Value Ref Range Status   08/07/2019 11.9 (L) 12.0 - 15.9 g/dL Final     Hematocrit   Date Value Ref Range Status   08/07/2019 38.1 34.0 - 46.6 % Final     Platelets   Date Value Ref Range Status   08/07/2019 319 140 - 450 10*3/mm3 Final        Lab Results   Component Value Date    GLUCOSE 112 08/07/2019    BUN 23 (H) 08/07/2019    CREATININE 1.25 (H) 08/07/2019    EGFRIFNONA 42 (L) 08/07/2019    BCR 18.4 08/07/2019    CO2 25.4 08/07/2019    CALCIUM 10.0 08/07/2019    ALBUMIN 4.20 08/07/2019    AST 14 08/07/2019    ALT 10 08/07/2019     CT head negative for metastatic disease 7/24/2019    Multiple studies reviewed including report from bronchoscopy/EBUS along with pathology, pathology from 8/1/2019 left upper lobe CT-guided biopsy.        Assessment/Plan      1. Limited stage small cell lung cancer:   · The patient completed concurrent chemoradiation with 6 cycles of cisplatin and -16 in March 2011 and achieved a complete response.   · She did receive PCI completing this in May 2011.    · The patient showed no evidence of recurrent disease.  2. Suspected metastatic non-small cell lung cancer  · Annual low-dose screening CT scan of the chest.  Follow-up study from 6/6/18 showed new findings with small to moderate right pleural effusion as well as right pleural thickening and potential lymphadenopathy along the right lower lobe bronchovascular bundle although difficult to identify with certainty.    · Suspicion for potential underlying malignancy. PET scan 6/15/18 showed no significant change in the right pleural effusion with low level activity in the right lower lobe bronchovascular bundle SUV of only 3.7.   There was therefore no definitive suspicious hypermetabolic activity to explain the pleural effusion.    · Diagnostic right thoracentesis on 6/20/18 with 250 cc clear yellow fluid removed with LDH 90, pH 7.5, total protein 1.8, appearing transudative with pathology showing no evidence of malignant cells. Etiology of the findings and pleural effusion unclear.  Patient referred back to cardiology and was started by PCP on Lasix 40 mg daily in July 2018.    · CT chest 7/13/18 with no change and negative/low probability V/Q scan 7/16/18.  · Repeat CT 8/17/18 showed near complete resolution of pleural effusions with only a minimal right pleural effusion remaining and some atelectasis/scarring in the right lower lobe.   · Follow-up CT chest 11/30/2018 with probable scar right lower lobe.  · Patient was hospitalized 5/22-5/27/2019 with suspected viral and bacterial pneumonia.  She did have evidence of metapneumovirus on respiratory viral panel.  CT chest 5/23/2019 showed increased consolidation right base consistent with infiltrate.  There was evidence of scattered groundglass opacity (likely the viral component).  There was an enlarging nodule however with irregular margins left upper lobe associated with left hilum worrisome for malignancy.   · PET scan 7/11/2019 showed enlarging hypermetabolic left upper lobe/perihilar mass 2.8 cm (SUV 19) with additional 6 mm subpleural left upper lobe lesion (SUV 6.2), right medial 10th rib/costal vertebral activity (SUV 4.5), right iliac 3.5 cm lesion (SUV 11.8), left iliac activity (SUV 5.3).  Activity in distal rectum and collapsed colon of unclear significance.   · CT head (unable to obtain MRI) negative for metastatic disease on 7/24/2019  · Bronchoscopy/EBUS 7/22/2019 unable to reach perihilar mass.  Left upper lobe brushings/washings negative for malignancy.    · CT-guided biopsy left upper lobe mass 8/1/2019- for malignancy.    · Plan CT-guided biopsy presumed metastatic right  iliac lesion.  · The patient returns today in follow-up to review results as noted above from her bronchoscopy/EBUS on 7/22/2019 and subsequent CT-guided biopsy of the left lung mass on the 8/1/2019.  Unfortunately both of the studies were nondiagnostic with no evidence of malignancy.  Clinically however she does appear to have metastatic malignancy, suspected lung primary.  We discussed pursuit of additional tissue for diagnosis.  The most accessible area it appears at this point will be the right iliac bone lesion.  Bone tissue however may not be amenable to performing molecular studies and I did discuss this with Dr. Hannon in pathology today.  We will attempt to get enough tissue in order to save part of the sample and avoid decalcification for possible testing for molecular studies and PDL1 if indicated.  Hopefully this will be performed in the next week and I will see her in 2 weeks to follow-up the results.  If we do find non-small cell lung cancer, I believe the patient will have limited ability to tolerate treatment, particularly in terms of chemotherapy.  She would be more appropriate possibly for single agent palliative immunotherapy.  We will discuss this further when she returns pending her path result.  She is symptomatic in terms of the right iliac lesion and will require palliative radiation therapy.  Hopefully this could be initiated as soon as we have a tissue diagnosis and I will go ahead and refer her to radiation oncology in the meantime.  3. Cancer related pain:   · The patient is experiencing pain around the right iliac crest at the site of the presumed metastatic lesion.  · The patient has been using hydrocodone 5/325 for pain relief however is not maximizing her narcotic medication at this time taking only 2 doses per day with pain rated currently at 10/10.  I have advised her to use this more liberally and try to stay ahead of the pain taking it every 4-6 hours.  She expressed  understanding.  A prescription was refilled today, #90.  · Patient is being referred to radiation oncology as noted above to pursue palliative course of radiation to the site once tissue diagnosis is obtained.  · We did discuss narcotic related constipation today and I have asked her again to begin Senokot 1 p.o. twice daily.  She may escalate this to 2 p.o. twice daily and add daily MiraLAX if needed.  4. History of stage I breast cancer on the left:   · The patient underwent a left mastectomy and completed 5 years of tamoxifen in July 2003.    · Exam on 6/8/2019 was negative.    · Annual right mammogram 9/5/18 was negative, BI-RADS 1.    5. 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 This does affect her balance; it is unchanged.   6. Stage III chronic kidney disease:   · Baseline creatinine in the 1.1-1.2 range.  · Creatinine today 1.25  7. Atrial fibrillation:   · Pacemaker placement 3/13/17 after having a syncopal episode.  · Patient continuing on Eliquis 5 mg twice daily.  Patient will need to hold Eliquis 2 days prior to upcoming CT-guided biopsy and resume the day after.  8. Mild dementia:   · Short-term memory is very poor.  This has been an ongoing issue for her.  Her scans do show significant progression of small vessel ischemic change.  It is felt that some of this has been precipitated by her previous PCI.  9. Sigmoid colon uptake on PET scan:   · PET scan 6/15/2018 did show activity in the distal sigmoid colon/rectum and the patient was referred back to her gastroenterologist Dr. Colton Watkins.    · He did perform a flexible sigmoidoscopy which was negative on 8/2/18 and likely the activity represents muscular activity in the bowel wall.    · Subsequent PET scan 7/11/2019 showed continued activity in the distal rectum SUV 15.9.  There was also activity throughout the colon, possibly suggestive of inflammation/colitis.     · Patient relatively asymptomatic from GI standpoint.    · Patient was not felt to require any additional endoscopic evaluation at this point.    PLAN:     1. Refill sent for hydrocodone 5/325 every 4-6 hours as needed, number increased to 90  2. Schedule CT-guided biopsy right iliac lesion.  Discussed with pathology and will hold a small portion of the sample which will not undergo decalcification and will potentially be sent for PDL 1 and molecular analysis if indicated.  3. Referral to radiation oncology to pursue palliative radiation to right iliac lesion once tissue diagnosis obtained  4. MD visit in 2 weeks with CBC, CMP.    I did spend 40 minutes with the patient today, 30 minutes in face-to-face consultation and coordination of care reviewing issues outlined in the assessment and plan above.  The patient was accompanied by multiple family members today.  I did answer questions to their satisfaction.

## 2019-08-12 NOTE — H&P (VIEW-ONLY)
DIAGNOSIS and REASON FOR CONSULTATION:    1. Bone metastasis (CMS/HCC)     specifically right iliac - for advice and recommendations regarding the diagnosis    Referring Provider:  Cornelio Almanzar Jr., MD  Patient Care Team:  Anitha Haynes MD as PCP - General (Internal Medicine)  Anitha Haynes MD as PCP - Claims Attributed  Cornelio Almanzar Jr., MD as Consulting Physician (Hematology and Oncology)  Anitha Haynes MD as Referring Physician (Internal Medicine)    CHIEF COMPLAINT:  For advice and recommendations regarding Bone metastasis (CMS/HCC)  HISTORY OF PRESENT ILLNESS:  The patient is a 73 y.o. year old female who has a history of a stage I left breast cancer dating back to 1998 treated with mastectomy and tamoxifen as well as a limited stage small cell lung cancer involving the right lower lobe and right hilum treated with concurrent chemo and radiation.  She received 6600 cGy in 33 treatments from November 19, 2010 through January 11, 2011.  This was followed by elective cranial radiation from May 9 through May 27, 2011 when she received 3000 cGy in 15 treatments all delivered by Dr. Camacho.    She has continued surveillance and follow-up since then.  CAT scan of the chest during hospitalization in May, 2019 for suspected viral and bacterial pneumonia showed consolidation of the right base consistent with infiltrate but also an enlarging nodule in the left upper lobe.    PET scan on July 11, 2019 showed significant interval increase increase in the irregular pulmonary nodule in the perihilar region of the left upper lobe now measuring 2.8 cm, increased from 1.9 cm lesion which was intensely hypermetabolic with an SUV of 19.  Additionally a 6 mm pleural-based nodule had increased in size with an SUV of 6.2.  No lymphadenopathy was appreciated.  New hypermetabolic bone lesions were appreciated within the right and left iliac body and wing as well as in the right 10th rib.  Finally  there was a thickened appearance in the distal rectum with an SUV of 15.9.    She underwent bronchoscopy on July 22, 2019 which unfortunately did not provide any malignant pathology.  A CT-guided biopsy of the left lung mass on August 1, 2019 also showed no evidence of malignancy.  CT of the head on July 24, 2019 was negative for malignancy.  She is scheduled for CT-guided biopsy of the iliac lesion on August 14, 2019.    Clinically she began to experience worsening pain around the right iliac lesion with radiation down the leg, impacting mobility about 3 weeks ago.  She is currently using hydrocodone every 5 hours which is helping some.  I was asked to see the patient at the request of the referring provider noted above for advice and recommendations regarding this diagnosis.  She has also been referred back to Dr. Colton Watkins for evaluation of the rectal uptake.    Performance Status: (1) Restricted in physically strenuous activity, ambulatory and able to do work of light nature    Past Medical History: she  has a past medical history of Aortic valve insufficiency, Atrial fibrillation and flutter (CMS/HCC), Axillary lump, Breast cancer (CMS/HCC), Chronic kidney disease, COPD (chronic obstructive pulmonary disease) (CMS/HCC), Dizziness, Fatigue, Lung cancer (CMS/HCC), Neutropenia (CMS/HCC), Osteoarthritis, Paroxysmal atrial fibrillation (CMS/HCC), Peripheral neuropathy, Pneumonia (06/2019), Psoriasis, Rectal polyp, Shingles (02/2012), Small cell lung cancer (CMS/HCC), Thyroid nodule, and Wheezing.    Past Surgical History:  she has a past surgical history that includes Mediport insertion, single; Colonoscopy; Mastectomy (Left, 1998); Breast reconstruction (Left, 1999); Breast Implant Revision (Right, 1999); cystoscopy bladder biopsy (05/2012); Exploratory laparotomy; Cardiac electrophysiology procedure (N/A, 3/13/2017); Mediport removal (Right); Pacemaker Implantation; Cataract extraction (Bilateral); and  Bronchoscopy (N/A, 7/22/2019).    Meds:    Current Outpatient Medications:   •  albuterol (PROVENTIL) (5 MG/ML) 0.5% nebulizer solution, Take 2.5 mg by nebulization Every 6 (Six) Hours As Needed for Wheezing., Disp: , Rfl:   •  apixaban (ELIQUIS) 5 MG tablet tablet, Take 5 mg by mouth 2 (Two) Times a Day., Disp: , Rfl:   •  bumetanide (BUMEX) 2 MG tablet, Take 2 mg by mouth Daily., Disp: , Rfl:   •  Fluticasone-Umeclidin-Vilant (TRELEGY ELLIPTA) 100-62.5-25 MCG/INH aerosol powder , Inhale 1 each Daily., Disp: , Rfl:   •  HYDROcodone-acetaminophen (NORCO) 5-325 MG per tablet, Take 1 tablet by mouth Every 6 (Six) Hours As Needed for Moderate Pain ., Disp: 90 tablet, Rfl: 0  •  metoprolol tartrate (LOPRESSOR) 25 MG tablet, Take 37.5 mg by mouth 2 (Two) Times a Day., Disp: , Rfl:   •  omeprazole (priLOSEC) 40 MG capsule, Take 40 mg by mouth Daily., Disp: , Rfl:   •  polyethylene glycol (MIRALAX) packet, Take 17 g by mouth 2 (Two) Times a Day., Disp: , Rfl:   •  potassium chloride (K-DUR,KLOR-CON) 20 MEQ CR tablet, Take 20 mEq by mouth Daily., Disp: , Rfl:     Allergies:    Allergies   Allergen Reactions   • Moxifloxacin Nausea Only     Passed out after taking       Family History:  her family history includes Cancer in her father; Colon cancer (age of onset: 70) in her father; Heart attack in her mother; Heart disease in her mother; Melanoma (age of onset: 70) in her mother.    Social History:  she  reports that she has quit smoking. She started smoking about 8 years ago. She has never used smokeless tobacco. She reports that she does not drink alcohol or use drugs.    Pertinent Findings on   Review of Systems   Constitutional: Positive for fatigue. Negative for appetite change, chills, diaphoresis, fever and unexpected weight change.   HENT:   Positive for nosebleeds. Negative for hearing loss, lump/mass, mouth sores, sore throat, tinnitus, trouble swallowing and voice change.    Eyes: Negative for eye problems and  icterus.   Respiratory: Positive for cough, shortness of breath and wheezing. Negative for chest tightness and hemoptysis.    Cardiovascular: Negative for chest pain, leg swelling and palpitations.   Gastrointestinal: Negative for abdominal distention, abdominal pain, blood in stool, constipation, diarrhea, nausea, rectal pain and vomiting.   Endocrine: Negative for hot flashes.   Genitourinary: Positive for pelvic pain. Negative for bladder incontinence, difficulty urinating, dyspareunia, dysuria, frequency, hematuria, menstrual problem, nocturia, vaginal bleeding and vaginal discharge.    Musculoskeletal: Positive for arthralgias. Negative for back pain, flank pain, gait problem, myalgias, neck pain and neck stiffness.   Skin: Negative for itching, rash and wound.   Neurological: Positive for extremity weakness. Negative for dizziness, gait problem, headaches, light-headedness, numbness, seizures and speech difficulty.   Hematological: Negative for adenopathy. Does not bruise/bleed easily.   Psychiatric/Behavioral: Negative for confusion, decreased concentration, depression, sleep disturbance and suicidal ideas. The patient is not nervous/anxious.    :  Vitals:    08/12/19 1008   BP: 105/71   Pulse: 83   SpO2: 92%   PainSc: 0-No pain  Comment: Pain when walking maxes out     Pertinent Findings on:  Physical Exam   Constitutional: She is oriented to person, place, and time. She appears well-developed and well-nourished. She is active and cooperative. No distress.   HENT:   Head: Normocephalic and atraumatic.   Nose: Nose normal.   Mouth/Throat: Mucous membranes are normal. Normal dentition.   Eyes: Conjunctivae and EOM are normal.   Neck: Normal range of motion.   Pulmonary/Chest: Effort normal.   Abdominal: Normal appearance. There is no hepatosplenomegaly.   Musculoskeletal:   In wheelchair; pain with weight bearing on right - pain localized to right buttock; some radiation down right leg     Vascular Status -   Her right foot exhibits no edema. Her left foot exhibits no edema.  Neurological: She is alert and oriented to person, place, and time.   Slightly somnolent   Skin: Skin is warm and dry.   Psychiatric: She has a normal mood and affect. Her behavior is normal. Judgment and thought content normal.     Assessment:   1. Bone metastasis (CMS/HCC)     This assessment comes from my review of the imaging, pathology, physician notes and other pertinent information as mentioned.    Plan:   We reviewed today the progression of the diagnostic imaging studies, the role of her systemic treatment and also the role of the radiation therapy in palliating symptomatic lesions or those with impending consequences.   Certainly we will need to await a tissue diagnosis but suspect this is related to a lung primary.  She will have her CT-guided biopsy on August 14.    We discussed a course of radiation therapy aimed at the right iliac region to a total dose of 3000 cGy in 10 treatments over 2 weeks. We reviewed the logistics and goals of the course of treatment. We then discussed acute side effects which are expected be minimal but might include slight erythema of the skin, possible irritability of the bowel with diarrhea, decreased appetite and possible fatigue. We also discussed the long-term effect of the radiation therapy on the bone and I believe all their questions were answered to their satisfaction.      Regarding the pain medication, she should continue the hydrocodone as she is currently using it and we discussed the constipation issues as well.    We were able to take our treatment planning films today and will be hopefully getting the treatments underway later this week, as soon as her pathology is available.      I spent greater than 45 minutes in face-to-face time with the patient and 30 minutes of that time were spent in counseling and coordination of care, including review of imaging and pathology; indications, goals,  logistics, alternatives and risks - both common and rare - for my recommendations as well as surveillance and potential outcomes.

## 2019-08-14 NOTE — NURSING NOTE
Discussed discharge instructions with patient and spouse.  Pt denies pain, verbalizes understanding.  Pt assisted to pt discharge by staff.

## 2019-08-14 NOTE — DISCHARGE INSTRUCTIONS
EDUCATION /DISCHARGE INSTRUCTIONS  CT/US guided biopsy:  A biopsy is a procedure done to remove tissue for further analysis.  Before images are taken to locate the target area.  Images can be obtained using ultrasound, CT or MRI.  A physician will clean your skin with antiseptic soap, place a sterile towel around the site and administer a local anesthetic to numb the area.  The physician will then insert a special needle.  Sometimes images are taken of the needle after it is inserted to ensure the needle is in the correct area to be biopsied.   A sample is obtained and sent to the laboratory for study.  Occasionally the laboratory is unable to make a diagnosis from the sample and the procedure may need to be repeated.  Within a week the radiologist will send a report to your physician.  A pathologist will also examine the tissue and send a report.    Risks of the procedure include but are not limited to:   *  Bleeding    *  Infection   *  Puncture of surrounding organs *  Death     *  Lung collapse if the biopsy is near the chest which may require insertion of a      chest tube to re-inflate the lung if severe.    Benefits of the procedure:  Using x-ray helps to locate the area that requires a biopsy. The procedure is less invasive than a surgical procedure, there are no large incisions and it does not require anesthesia.    Alternatives to the procedure:  A biopsy can be performed surgically.  Risks of a surgical biopsy include exposure to anesthesia, infection, excessive bleeding and injury to abdominal organs.  A benefit of surgical biopsy is the ability to see the area to be biopsied and remove of a larger piece of tissue.    THIS EDUCATION INFORMATION WAS REVIEWED PRIOR TO PROCEDURE AND CONSENT. Patient initials__________________Time__0810_________________    Post Procedure:    *  Expect the biopsy site may be tender up to one week.    *  Rest today (no pushing pulling or straining).   *  Slowly increase  activity tomorrow.    *  If you received sedation do not drive for 24 hours.   *  Keep dressing clean and dry.   *  Leave dressing on puncture site for 24 hours.    *  You may shower when dressing removed.  Call your doctor if experiencing:   *  Signs of infection such as redness, swelling, excessive pain and / or foul        smelling drainage from the puncture site.   *  Chills or fever over 101 degrees (by mouth).   *  Unrelieved pain.   *  Any new or severe symptoms.   *  If experiencing sudden / severe shortness of breath or chest pain go to the       nearest emergency room.   Following the procedure:     Follow-up with the ordering physician as directed.    Continue to take other medications as directed by your physician unless    otherwise instructed.   If applicable, resume taking your blood thinners or Aspirin on __Eliquis resumed 8/15/19 the evening dose_________.    If you have any concerns please call the Radiology Nurses Desk at [phone].  You are the most important factor in your recovery.  Follow the above instructions carefully.

## 2019-08-15 NOTE — TELEPHONE ENCOUNTER
11:40AM-- left a message on krishna's v/m that dr. Almanzar did get the message that dr. Gilbert was waiting to start RT after she gets the path report back and he is perfectly fine with that decision.

## 2019-08-15 NOTE — TELEPHONE ENCOUNTER
"Pt's daughter, Anitha Hogue called this morning to ask some questions. Had a long conversation with her this morning about her mother's treatment. Pt's appt for radiation therapy was cancelled d/t not having pathology results back from her bone biopsy that was preformed yesterday 8/15/19. These results usually take around 48 house but can sometimes take longer. The daughter was confused on why this treatment was cancelled because she had driven up from [place], [place] to bring her mother to tx. However, this information was conveyed to pt, pt's , and pt's son in consult with Dr. Gilbert. They were aware that her appt for today was tentative on her bone biopsy pathology being back.  Pt's daughter was also asking about what kind of radiation treatment she was having, as she has heard of \"other life saving radiation treatments\" including proton radiation and cyberknife treatments.. I explained to her that her mother will be receiving photon radiation and that this is for pain management from her bone mets. She was curious about other treatment options. I offered for her to look into other treatment options at other radiation treatment centers if she so wished. She has my number for any further questions and we will let her and the family know when the pathology results are back. Patients appt was changed from 1015am on 8/16/19 to 420pm the same day to allow for more time for pathology results to come back before her treatment time. This treatment will take place at the [facility].  Patients daughter was made aware of this, and verbalized understanding.  "

## 2019-08-15 NOTE — TELEPHONE ENCOUNTER
----- Message from Anna Montes sent at 8/15/2019 11:45 AM EDT -----  Contact: [phone]  Return call questions.

## 2019-08-15 NOTE — TELEPHONE ENCOUNTER
S/w pts. Daughter again about her concerns regarding all the different types of RT- photon, cyberknife etc.  Was told by Mary at dr. Olvera's office that dr. Gilbert is ok if pt wants to explore different types or if she wants to go for a second opinion.  Informed daughter that Mary is planning to leave dr. Gilbert  A note to call her.  Informed her dr. Almanzar is off the rest of the week and part of next week.  Understanding noted.

## 2019-08-15 NOTE — TELEPHONE ENCOUNTER
Lengthy conversation with pt. Daughter Anitha.  Concerned about pt. Radiation being cancelled today.  Was told that dr. gilbert wanted to wait until the path report came back.  S/w Mary in radiation ( she called pt. Daughter as I was on the phone with her)  Mary stated again that pts radiation is under the care of dr. Gilbert and this is how she wants to proceed.  Daughter had questions about the various types of radiation such as cyber knife etc.  Mary explained to pt That dr. gilbert is fine if she wants to seek other options.  Informed Anitha I will let dr. Almanzar know what is going on and that RT has not been started yet.  Mary is going to let dr. Gilbert know that daughter would like to s/w her concerning other options.

## 2019-08-15 NOTE — TELEPHONE ENCOUNTER
----- Message from Hellen Noonan sent at 8/15/2019  9:17 AM EDT -----  Regarding: daughter has some questions she says radiatin treatment had been cancelled   Contact: [phone]  She is on chart to be able to speak to nurse  Her name is Anitha I did tell her the appts for radiation were still on schedule and to call there too but still had a some other questions about treatment

## 2019-08-21 PROBLEM — C34.92 NON-SMALL CELL CANCER OF LEFT LUNG (HCC): Status: ACTIVE | Noted: 2019-01-01

## 2019-08-21 NOTE — PROGRESS NOTES
Subjective .     REASONS FOR FOLLOWUP:    1. History of stage I left breast cancer in 1998, ER/[place] positive, status post mastectomy followed by tamoxifen x 5 years, completed in 07/2003.    2. Limited stage small cell lung cancer with right lower lobe mass, right hilar adenopathy.  Initiation of cisplatin/-16 chemotherapy, cycle 1 initiated 11/17/10.  Concurrent radiation therapy initiated 11/19/10.  Radiation therapy completed 01/11/11.   3. Status post Mediport placement on 12/27/10.  Mediport removed in November 2011.    4. Cycle #6 of cisplatin/-16 chemotherapy initiated 3/1/11.  Complete response seen on subsequent CT scan dated 3/28/11.       5. 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   6. Status post PCI, completed 5-27-11.     7. Right thyroid nodule  8. Cancer related pain receiving hydrocodone 5/325  9. Metastatic non-small cell lung cancer:  · PET scan 7/11/2019 showing enlarging hypermetabolic left upper lobe/perihilar mass 2.8 cm (SUV 19) with additional 6 mm subpleural left upper lobe lesion (SUV 6.2), right medial 10th rib/costal vertebral activity (SUV 4.5), right iliac 3.5 cm lesion (SUV 11.8), left iliac activity (SUV 5.3).  Activity in distal rectum and collapsed colon of unclear significance.   · CT head (unable to obtain MRI) negative for metastatic disease on 7/24/2019  · Bronchoscopy/EBUS 7/22/2019 unable to reach perihilar mass.  Left upper lobe brushings/washings negative for malignancy.    · CT-guided biopsy left upper lobe mass 8/1/2019 negative for malignancy.    · CT-guided biopsy right iliac lesion on 8/14/19 with poorly differentiated carcinoma. PDL1 15%, insufficient material for molecular testing.  · Clinically felt to represent metastatic non-small cell lung cancer  · Foundation medicine liquid biopsy pending from 8/21/2019  · Initiated palliative radiation to right iliac lesion 8/15/2019, due to complete on 8/29/2019  · Patient is not a  candidate for chemotherapy due to comorbidities.  Discussed single agent palliative immunotherapy with Keytruda versus palliative/supportive care with hospice involvement.  Patient elected to pursue Keytruda.  Plans to initiate treatment 8/30/2019.    HISTORY OF PRESENT ILLNESS:  The patient is a 73 y.o. year old female who is here for follow-up with the above-mentioned history.    History of Present Illness patient returns today in follow-up with pathology results to review from right iliac crest biopsy 8/14/2019.  Fortunately we were able to obtain a tissue diagnosis from the biopsy confirming a poorly differentiated carcinoma that is clinically felt to represent metastatic non-small cell lung cancer.  She was able to then begin palliative radiation to her symptomatic right iliac metastatic lesion on 8/15/2019.  Unfortunately she has not experienced any significant improvement in her pain yet.  Despite our lengthy conversation at her last visit, he has been minimizing use of hydrocodone 5/325 due to fears of constipation and has not been using Senokot on a routine basis, using it only intermittently as needed.  She remains in a wheelchair today, mainly related to difficulty with ambulation due to pain from the lesion.  She notes a stable appetite although her weight today has decreased significantly from 194 down to 185 pounds, unclear whether this is accurate.  She does note chronic dyspnea on exertion which is unchanged.    PAST MEDICAL HISTORY:   1. Chronic obstructive pulmonary disease.    2. Right low thoracic dermatome shingles in 02/12.    3. Cystoscopy with bladder biopsy in 05/2012.    4. Psoriasis identified in the right heel in 2013.    5. Osteoarthritis.  6. Colonoscopy 8/4/15 with 5 mm benign rectal polyp.  7. Right sided thyroid nodule.  8. Atrial fibrillation on anticoagulation with Eliquis 5 mg twice a day.  Pacemaker placed 3/13/17.    SURGICAL HISTORY:    1. Left mastectomy in 1998, left breast  reconstruction and right breast implant placement in 1999.    2. Exploratory laparotomy.   3. Status post pacemaker placement left chest wall 3/13/17.     ONCOLOGIC HISTORY:  (History from previous dates can be found in the separate document.)  Past Medical History:   Diagnosis Date   • Aortic valve insufficiency    • Atrial fibrillation and flutter (CMS/HCC)    • Axillary lump    • Breast cancer (CMS/HCC)     Left, stage I; ER/[place] positive   • Chronic kidney disease     Stage 3   • COPD (chronic obstructive pulmonary disease) (CMS/HCC)    • Dizziness     upon standing   • Fatigue    • Hypertension    • Lung cancer (CMS/HCC)    • Neutropenia (CMS/HCC)    • Osteoarthritis    • Paroxysmal atrial fibrillation (CMS/HCC)    • Peripheral neuropathy     Secondary to cisplatin.   • Pneumonia 06/2019   • Psoriasis     Identified in the right heel in 2013.   • Rectal polyp    • Shingles 02/2012    RIGHT LOW THORACIC    • Small cell lung cancer (CMS/HCC)     Limited stage   • Thyroid nodule     RIGHT   • Wheezing      Past Surgical History:   Procedure Laterality Date   • BREAST IMPLANT SURGERY Right 1999   • BREAST RECONSTRUCTION Left 1999   • BRONCHOSCOPY N/A 7/22/2019    Procedure: BRONCHOSCOPY WITH ENDOBRONCHIAL ULTRASOUND WITH BAL AND BRUSHINGS;  Surgeon: Johnathan Elliott MD;  Location: [facility] ENDOSCOPY;  Service: Pulmonary   • CARDIAC ELECTROPHYSIOLOGY PROCEDURE N/A 3/13/2017    Procedure: Device Implant  DDD pacer  medtronic;  Surgeon: Armani Kennedy MD;  Location: [facility] CATH INVASIVE LOCATION;  Service:    • CATARACT EXTRACTION Bilateral    • COLONOSCOPY     • CYSTOSCOPY BLADDER BIOPSY  05/2012   • EXPLORATORY LAPAROTOMY      Several years ago.   • MASTECTOMY Left 1998   • MEDIPORT INSERTION, SINGLE     • MEDIPORT REMOVAL Right    • PACEMAKER IMPLANTATION           Current Outpatient Medications on File Prior to Visit   Medication Sig Dispense Refill   • albuterol (PROVENTIL) (5 MG/ML) 0.5% nebulizer solution  Take 2.5 mg by nebulization Every 6 (Six) Hours As Needed for Wheezing.     • apixaban (ELIQUIS) 5 MG tablet tablet Take 5 mg by mouth 2 (Two) Times a Day.     • bumetanide (BUMEX) 2 MG tablet Take 2 mg by mouth Daily.     • Fluticasone-Umeclidin-Vilant (TRELEGY ELLIPTA) 100-62.5-25 MCG/INH aerosol powder  Inhale 1 each Daily.     • HYDROcodone-acetaminophen (NORCO) 5-325 MG per tablet Take 1 tablet by mouth Every 6 (Six) Hours As Needed for Moderate Pain . 90 tablet 0   • metoprolol tartrate (LOPRESSOR) 25 MG tablet Take 37.5 mg by mouth 2 (Two) Times a Day.     • omeprazole (priLOSEC) 40 MG capsule Take 40 mg by mouth Daily.     • polyethylene glycol (MIRALAX) packet Take 17 g by mouth 2 (Two) Times a Day.     • potassium chloride (K-DUR,KLOR-CON) 20 MEQ CR tablet Take 20 mEq by mouth Daily.     • senna (SENOKOT) 8.6 MG tablet Take 1 tablet by mouth Daily.       No current facility-administered medications on file prior to visit.        ALLERGIES:     Allergies   Allergen Reactions   • Moxifloxacin Nausea Only     Passed out after taking       SOCIAL HISTORY:  .  Drinks approximately three beers every other day.  Long-standing smoking history. No HIV risk factors.  Social History     Socioeconomic History   • Marital status:      Spouse name: Dirk   • Number of children: Not on file   • Years of education: Not on file   • Highest education level: Not on file   Occupational History     Employer: RETIRED   Tobacco Use   • Smoking status: Former Smoker     Start date: 2011   • Smokeless tobacco: Never Used   • Tobacco comment: quit 6 years ago    //    caffeine use - one soft drink daily    Substance and Sexual Activity   • Alcohol use: No     Comment: Occasional beer, approximately 3 beers every other day.   • Drug use: No   • Sexual activity: Defer         FAMILY HISTORY:  Positive for melanoma in her mother, diagnosed at the age of 70 and  from heart attack at age of 80.  Father had  colon cancer at age 70 and  at age of 80 of abdominal carcinomatosis.    Family History   Problem Relation Age of Onset   • Heart disease Mother    • Heart attack Mother    • Melanoma Mother 70   • Colon cancer Father 70   • Cancer Father         Abdominal carcinomatosis   • Stroke Neg Hx        Review of Systems   Constitutional: Positive for activity change, fatigue and unexpected weight change. Negative for appetite change.   HENT: Negative for mouth sores, nosebleeds and voice change.    Eyes: Negative for itching.   Respiratory: Positive for shortness of breath. Negative for wheezing.    Cardiovascular: Negative for palpitations and leg swelling.   Gastrointestinal: Negative for abdominal distention, blood in stool, constipation and diarrhea.   Endocrine: Negative for cold intolerance and heat intolerance.   Genitourinary: Negative for difficulty urinating, dysuria, frequency and hematuria.   Musculoskeletal: Positive for arthralgias and gait problem. Negative for back pain and neck pain.   Neurological: Positive for weakness. Negative for dizziness, syncope, light-headedness and numbness.   Hematological: Negative for adenopathy. Does not bruise/bleed easily.   Psychiatric/Behavioral: Negative for confusion and sleep disturbance. The patient is not nervous/anxious.          Objective      Vitals:    19 1427   BP: 103/65   Pulse: 63   Resp: 16   Temp: 97.5 °F (36.4 °C)   SpO2: 94%      Current Status 2019   ECOG score 3   Pain 0/10    Physical Exam   Constitutional: She is oriented to person, place, and time. She appears well-developed and well-nourished.   Elderly female no distress seated in a wheelchair   HENT:   Mouth/Throat: Oropharynx is clear and moist.   Eyes: Conjunctivae are normal.   Neck: No thyromegaly present.   Cardiovascular: Normal rate and regular rhythm. Exam reveals no gallop and no friction rub.   No murmur heard.  Pulmonary/Chest: No respiratory distress. She has wheezes.  Right breast exhibits no mass. Left breast exhibits no mass.   A few scattered wheezes, mainly on the right   Abdominal: Soft. Bowel sounds are normal. She exhibits no distension. There is no tenderness.   Musculoskeletal: She exhibits no edema.   Lymphadenopathy:        Head (right side): No submandibular adenopathy present.     She has no cervical adenopathy.     She has no axillary adenopathy.        Right: No inguinal and no supraclavicular adenopathy present.        Left: No inguinal and no supraclavicular adenopathy present.   Neurological: She is alert and oriented to person, place, and time. She displays normal reflexes. No cranial nerve deficit. She exhibits normal muscle tone.   Skin: Skin is warm and dry. No rash noted.   Psychiatric: She has a normal mood and affect. Her behavior is normal.       RECENT LABS:  Hematology WBC   Date Value Ref Range Status   08/21/2019 7.72 3.40 - 10.80 10*3/mm3 Final     RBC   Date Value Ref Range Status   08/21/2019 3.95 3.77 - 5.28 10*6/mm3 Final     Hemoglobin   Date Value Ref Range Status   08/21/2019 11.6 (L) 12.0 - 15.9 g/dL Final     Hematocrit   Date Value Ref Range Status   08/21/2019 37.8 34.0 - 46.6 % Final     Platelets   Date Value Ref Range Status   08/21/2019 330 140 - 450 10*3/mm3 Final        Lab Results   Component Value Date    GLUCOSE 104 08/21/2019    BUN 22 (H) 08/21/2019    CREATININE 1.32 (H) 08/21/2019    EGFRIFNONA 39 (L) 08/21/2019    BCR 16.7 08/21/2019    CO2 27.3 08/21/2019    CALCIUM 9.8 08/21/2019    ALBUMIN 4.20 08/21/2019    AST 11 08/21/2019    ALT 8 08/21/2019     Reviewed pathology results from right iliac biopsy 8/14/2019 as described above and below.      Assessment/Plan      1. Limited stage small cell lung cancer:   · The patient completed concurrent chemoradiation with 6 cycles of cisplatin and -16 in March 2011 and achieved a complete response.   · She did receive PCI completing this in May 2011.    · The patient showed no  evidence of recurrent disease.  2. Metastatic non-small cell lung cancer:  · Annual low-dose screening CT scan of the chest.  Follow-up study from 6/6/18 showed new findings with small to moderate right pleural effusion as well as right pleural thickening and potential lymphadenopathy along the right lower lobe bronchovascular bundle although difficult to identify with certainty.    · Suspicion for potential underlying malignancy. PET scan 6/15/18 showed no significant change in the right pleural effusion with low level activity in the right lower lobe bronchovascular bundle SUV of only 3.7.  There was therefore no definitive suspicious hypermetabolic activity to explain the pleural effusion.    · Diagnostic right thoracentesis on 6/20/18 with 250 cc clear yellow fluid removed with LDH 90, pH 7.5, total protein 1.8, appearing transudative with pathology showing no evidence of malignant cells. Etiology of the findings and pleural effusion unclear.  Patient referred back to cardiology and was started by PCP on Lasix 40 mg daily in July 2018.    · CT chest 7/13/18 with no change and negative/low probability V/Q scan 7/16/18.  · Repeat CT 8/17/18 showed near complete resolution of pleural effusions with only a minimal right pleural effusion remaining and some atelectasis/scarring in the right lower lobe.   · Follow-up CT chest 11/30/2018 with probable scar right lower lobe.  · Patient was hospitalized 5/22-5/27/2019 with suspected viral and bacterial pneumonia.  She did have evidence of metapneumovirus on respiratory viral panel.  CT chest 5/23/2019 showed increased consolidation right base consistent with infiltrate.  There was evidence of scattered groundglass opacity (likely the viral component).  There was an enlarging nodule however with irregular margins left upper lobe associated with left hilum worrisome for malignancy.   · PET scan 7/11/2019 showed enlarging hypermetabolic left upper lobe/perihilar mass 2.8 cm  (SUV 19) with additional 6 mm subpleural left upper lobe lesion (SUV 6.2), right medial 10th rib/costal vertebral activity (SUV 4.5), right iliac 3.5 cm lesion (SUV 11.8), left iliac activity (SUV 5.3).  Activity in distal rectum and collapsed colon of unclear significance.   · CT head (unable to obtain MRI) negative for metastatic disease on 7/24/2019  · Bronchoscopy/EBUS 7/22/2019 unable to reach perihilar mass.  Left upper lobe brushings/washings negative for malignancy.    · CT-guided biopsy left upper lobe mass 8/1/2019- for malignancy.    · CT-guided biopsy right iliac lesion on 8/14/19 with poorly differentiated carcinoma. PDL1 15%, insufficient material for molecular testing.  · Clinically felt to represent metastatic non-small cell lung cancer  · Foundation medicine liquid biopsy pending from 8/21/2019  · Initiated palliative radiation to right iliac lesion 8/15/2019, due to complete on 8/29/2019  · Patient is not a candidate for chemotherapy due to comorbidities.  Discussed single agent palliative immunotherapy with Keytruda versus palliative/supportive care with hospice involvement.  Patient elected to pursue Keytruda.  Plans to initiate treatment 8/30/2019.  · The patient returns today to review results from her CT-guided right iliac biopsy on 8/14/2019 and to discuss options for systemic therapy.  As noted above, the biopsy did show poorly differentiated carcinoma.  While this could not be verified as lung origin by immunohistochemistry, clearly she clinically has metastatic non-small cell lung cancer.  We were able to obtain PDL 1 results on the pathology which is positive at 15%.  There was not sufficient material for molecular testing and we did draw a blood sample today for foundation medicine liquid biopsy with results that should be available in the next 2 to 3 weeks.  Fortunately she was able to begin palliative radiation to her painful right iliac bone lesion.  She has not yet experienced any  significant relief from radiation however.  She will complete this on 8/29/2019.  I had a lengthy discussion with the patient and her family today regarding her metastatic disease and the palliative intent of therapy.  With her comorbidities and compromised ECOG performance status of 2, I feel she is a poor candidate for chemotherapy.  With her PDL 1 at 15%, she would be a candidate for single agent immunotherapy (Keytruda currently FDA approved for patients with positive PDL 1 and contraindication for chemotherapy) although with somewhat limited expectation for significant response.  We did discuss side effects of immunotherapy today.  We discussed the option for palliative/supportive care alone with home hospice involvement.  Patient expressed some concerns regarding experiences with hospice in the past and we discussed their services and the role in managing symptoms.  She is aware that at some point she will likely require hospice involvement.  She does wish to proceed with immunotherapy at this point.  Therefore she will return for education and once she completes her radiation therapy next week she will return on 8/30/2019 to begin cycle 1 Keytruda.  We did discuss potential future use of Xgeva monthly given her bone involvement however we will consider this further when she is due for her second cycle of Keytruda depending on her status.  3. Cancer related pain:   · Continued significant pain due to metastatic lesion in the right iliac bone  · Patient is using hydrocodone 5/325, is somewhat reluctant to take the pain medication still due to concern for constipation despite extensive conversation at the last visit  · Patient is continuing on palliative radiation to the site and initiated on 8/15/2019.  Hopefully she will begin to experience some improvement from radiation soon.  · I had another conversation with the patient today encouraging her to use narcotic pain medication as prescribed and to manage her  constipation with routine administration of Senokot 1-2 twice daily and add MiraLAX as needed daily.  4. History of stage I breast cancer on the left:   · The patient underwent a left mastectomy and completed 5 years of tamoxifen in July 2003.    · Exam on 6/8/2019 was negative.    · Annual right mammogram 9/5/18 was negative, BI-RADS 1.    5. 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 This does affect her balance; it is unchanged.   6. Stage III chronic kidney disease:   · Baseline creatinine in the 1.1-1.2 range.  · Creatinine today 1.32  7. Atrial fibrillation:   · Pacemaker placement 3/13/17 after having a syncopal episode.  · Patient continuing on Eliquis 5 mg twice daily.   8. Mild dementia:   · Short-term memory is very poor.  This has been an ongoing issue for her.  Her scans do show significant progression of small vessel ischemic change.  It is felt that some of this has been precipitated by her previous PCI.  9. Sigmoid colon uptake on PET scan:   · PET scan 6/15/2018 did show activity in the distal sigmoid colon/rectum and the patient was referred back to her gastroenterologist Dr. Colton Watkins.    · He did perform a flexible sigmoidoscopy which was negative on 8/2/18 and likely the activity represents muscular activity in the bowel wall.    · Subsequent PET scan 7/11/2019 showed continued activity in the distal rectum SUV 15.9.  There was also activity throughout the colon, possibly suggestive of inflammation/colitis.    · Patient relatively asymptomatic from GI standpoint.    · Patient was not felt to require any additional endoscopic evaluation at this point.    PLAN:     1. Foundation medicine liquid biopsy sent today  2. Continue palliative radiation to right iliac crest lesion, due to be complete on 8/29/2019  3. Continue hydrocodone 5/325 as needed for cancer related pain  4. Patient will begin scheduled Senokot 1-2  twice daily along with MiraLAX daily as needed constipation  5. Education for upcoming immunotherapy with Keytruda  6. In 1 week nurse practitioner visit with CBC, CMP, TSH, free T4 and begin cycle 1 Keytruda 200 mg IV day 1 on a 21-day cycle  7. In 2 weeks nurse practitioner visit with CBC, CMP  8. 4 weeks MD visit with CBC, CMP.  Patient will be due for cycle 2 Keytruda.  We will discuss the potential use of monthly Xgeva as well.

## 2019-08-23 NOTE — TELEPHONE ENCOUNTER
----- Message from Asael Mills sent at 8/23/2019  9:12 AM EDT -----  Contact: [phone]  Pt is spitting up small of blood        Pt calling because she has been spitting up a small amount of blood after she uses her nebulizer. This has been going on for a few days. She called and s/w the on call MD last night and Dr. Jean told her to hold her Eliquis. D/W Dr. Almanzar. Per Dr. Almanzar, have pt hold her Eliquis until she is seen on 8/30. Pt questioned if the albuterol nebulizer would be causing the blood in sputum. Per Melany, pharmacist, this should not be causing the bleeding. Informed pt and she v/u.

## 2019-08-23 NOTE — PROGRESS NOTES
"  Physician Weekly Management Note    Diagnosis:   Bone metastasis (CMS/HCC) Cancer Staging  Stage IV (cT1c, cN0, cM1c)    Reason for Visit: Radiation (6/10)    Concurrent Chemo:   No    Notes on Treatment course, Films, Medical progress and Plan:  Doing about the same. Pain unchanged. Hurts with any weight bearing.  No problems or questions, cont on.    Performance Status:  (2) Ambulatory and capable of self care, unable to carry out work activity, up and about > 50% or waking hours    ROS - Other than as listed above, as follows:  Constitutional - Normal - no complaints of fatigue, denies lack of appetite, fever, night sweats or change in weight.  Cardiovascular - Normal - denies arrhythmias, chest pain, dyspnea, edema, orthopnea or palpitations.  Respiratory - Normal - denies cough, dyspnea, hemoptysis, hiccoughs, pleuritic chest pain or wheezing.  Gastrointestinal - Normal - no complaints of constipation, abdominal pain, diarrhea, heartburn/dyspepsia, hematemesis, hemorrhoids, melena or GI bleeding, nausea, pain or cramping or vomiting.    PHYSICAL EXAM - Other than as listed above, as follows:  Vitals:    08/23/19 1411   BP: 110/70   Pulse: 87   SpO2: 97%   PainSc: 0-No pain         Constitutional - Normal - no evidence of impaired alertness, inadequate appearance, premature or advanced chronologic age, uncooperativeness, altered mood, affect or disorientation.    Problem added:  No problems updated.  Medications added: No orders of the defined types were placed in this encounter.    Ancillary referrals made: No orders of the defined types were placed in this encounter.      Technical aspects reviewed:  Weekly OBI approved if applicable? Yes  Weekly port films approved?   Yes  Change requests noted if applicable?  Yes  Patient setup and plan reviewed?  Yes    Chart Reviewed:  Continue current treatment plan?  Yes  Treatment plan change requested?  No    I have reviewed and marked as \"reviewed\" the current " medications, allergies and problem list in the patients EMR.    I have reviewed the patient's medical, surgical  history in detail, reviewed any pertinent lab work  and updated the computerized patient record if needed.    Patient's Care Team:  Patient Care Team:  Anitha Haynes MD as PCP - General (Internal Medicine)  Anitha Haynes MD as PCP - Claims Attributed  Cornelio Almanzar Jr., MD as Consulting Physician (Hematology and Oncology)  Anitha Haynes MD as Referring Physician (Internal Medicine)

## 2019-08-23 NOTE — TELEPHONE ENCOUNTER
On-call note:    The patient called last night with some blood in her nebulizer only when she uses her nebulizers.  Otherwise she has no hemoptysis.  This has been ongoing for a couple of weeks.  She is anticoagulated for atrial fibrillation.  I advised her to hold her anticoagulant overnight and in the morning and follow-up with our office during business hours for further guidance.

## 2019-08-26 NOTE — PROGRESS NOTES
Subjective     PATIENT NAME:  Hui Workman  YOB: 1945  PATIENTS AGE:  73 y.o.  PATIENTS SEX:  female  DATE OF SERVICE:  08/26/2019  PROVIDER:  YEFRI Jean-Baptiste      ____________________PATIENT EDUCATION____________________    PATIENT EDUCATION:  Today I met with the patient to discuss the chemotherapy regimen recommended for treatment of her disease.  The patient was given explanation of treatment premed side effects including office policy that prohibits patients to drive if sedating medications are administered, MD explanation given regarding benefits, side effects, toxicities and goals of treatment.  The patient received a Chemotherapy/Biotherapy Plan Summary including diagnosis and specific treatment plan.    SIDE EFFECTS:  Common side effects were discussed with the patient and family.  Discussion included hair loss/discoloration, anemia/fatigue, infection/chills/fever, appetite, bleeding risk/precautions, constipation, diarrhea, mouth sores, taste alteration, loss of appetite,nausea/vomiting, peripheral neuropathy, skin/nail changes, rash, muscle aches/weakness, photosensitivity, weight gain/loss, hearing loss, dizziness, menopausal symptoms, menstrrual irregularity, sterility, high blood pressure, heart damage, liver damage, lung damage, kidney damage, DVT/PE risk, fluid retention, pleural/pericardial effusion, somnolence, electrolyte/LFT imbalance, vein exercises and/or the possible need for vascular access/port placement.  The patient was advice that although uncommon, leakage of an infused medication from the vein or venous access device (port) may lead to skin breakdown and/or other tissue damage.  The patient was advised that he/she may have pain, bleeding, and/or bruising from the insertion of a needle in their vein or venous access device (port).  The patient was further advised that, in spite of proper technique, infection with redness and irritation may rarely occur at the  site where the needle was inserted.  The patient was advised that if complications occur, additional medical treatment is available.    Discussion also included side effects specific to drugs in the treatment plan, specifically Keytruda.      A total of 60 minutes were spent with the patient, with 100% of time spent in education and counseling.

## 2019-08-29 NOTE — PROGRESS NOTES
Physician Weekly Management Note    Diagnosis: Bone Mets, treating right hip  Stage IV (cT1c, cN0, cM1c)    Reason for Visit: Radiation (10/10)    Concurrent Chemo:   No    Notes on Treatment course, Films, Medical progress and Plan:  Doing about the same.  No problems or questions, begins treatment with CBC tomorrow. Will see back prn and call if pain worsens or we can help further.     Performance Status:  (2) Ambulatory and capable of self care, unable to carry out work activity, up and about > 50% or waking hours    ROS - Other than as listed above, as follows:  Constitutional - Normal - no complaints of fatigue, denies lack of appetite, fever, night sweats or change in weight.  Cardiovascular - Normal - denies arrhythmias, chest pain, dyspnea, edema, orthopnea or palpitations.  Respiratory - Normal - denies cough, dyspnea, hemoptysis, hiccoughs, pleuritic chest pain or wheezing.  Gastrointestinal - Normal - no complaints of constipation, abdominal pain, diarrhea, heartburn/dyspepsia, hematemesis, hemorrhoids, melena or GI bleeding, nausea, pain or cramping or vomiting.    PHYSICAL EXAM - Other than as listed above, as follows:  Vitals:    08/29/19 1358   Pulse: 82   SpO2: 99%   PainSc: 0-No pain         Constitutional - Normal - no evidence of impaired alertness, inadequate appearance, premature or advanced chronologic age, uncooperativeness, altered mood, affect or disorientation.    Problem added:  No problems updated.  Medications added: No orders of the defined types were placed in this encounter.    Ancillary referrals made: No orders of the defined types were placed in this encounter.      Technical aspects reviewed:  Weekly OBI approved if applicable? Yes  Weekly port films approved?   Yes  Change requests noted if applicable?  Yes  Patient setup and plan reviewed?  Yes    Chart Reviewed:  Continue current treatment plan?  Yes  Treatment plan change requested?  No    I have reviewed and marked as  "\"reviewed\" the current medications, allergies and problem list in the patients EMR.    I have reviewed the patient's medical, surgical  history in detail, reviewed any pertinent lab work  and updated the computerized patient record if needed.    Patient's Care Team:  Patient Care Team:  Anitha Haynes MD as PCP - General (Internal Medicine)  Cornelio Almanzar Jr., MD as PCP - Claims Attributed  Cornelio Almanzar Jr., MD as Consulting Physician (Hematology and Oncology)  Anitha Haynes MD as Referring Physician (Internal Medicine)        "

## 2019-09-06 NOTE — PROGRESS NOTES
Subjective .     REASONS FOR FOLLOWUP:    1. History of stage I left breast cancer in 1998, ER/NH positive, status post mastectomy followed by tamoxifen x 5 years, completed in 07/2003.    2. Limited stage small cell lung cancer with right lower lobe mass, right hilar adenopathy.  Initiation of cisplatin/-16 chemotherapy, cycle 1 initiated 11/17/10.  Concurrent radiation therapy initiated 11/19/10.  Radiation therapy completed 01/11/11.   3. Status post Mediport placement on 12/27/10.  Mediport removed in November 2011.    4. Cycle #6 of cisplatin/-16 chemotherapy initiated 3/1/11.  Complete response seen on subsequent CT scan dated 3/28/11.       5. 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   6. Status post PCI, completed 5-27-11.     7. Right thyroid nodule  8. Cancer related pain receiving hydrocodone 5/325  9. Metastatic non-small cell lung cancer:  · PET scan 7/11/2019 showing enlarging hypermetabolic left upper lobe/perihilar mass 2.8 cm (SUV 19) with additional 6 mm subpleural left upper lobe lesion (SUV 6.2), right medial 10th rib/costal vertebral activity (SUV 4.5), right iliac 3.5 cm lesion (SUV 11.8), left iliac activity (SUV 5.3).  Activity in distal rectum and collapsed colon of unclear significance.   · CT head (unable to obtain MRI) negative for metastatic disease on 7/24/2019  · Bronchoscopy/EBUS 7/22/2019 unable to reach perihilar mass.  Left upper lobe brushings/washings negative for malignancy.    · CT-guided biopsy left upper lobe mass 8/1/2019 negative for malignancy.    · CT-guided biopsy right iliac lesion on 8/14/19 with poorly differentiated carcinoma. PDL1 15%, insufficient material for molecular testing.  · Clinically felt to represent metastatic non-small cell lung cancer  · Foundation medicine liquid biopsy pending from 8/21/2019  · Initiated palliative radiation to right iliac lesion 8/15/2019, due to complete on 8/29/2019  · Patient is not a  candidate for chemotherapy due to comorbidities.  Keytruda initiated 8/30/2019..    HISTORY OF PRESENT ILLNESS:  The patient is a 73 y.o. year old female who is here for follow-up with the above-mentioned history she is 1 week out from her initial Keytruda.  She has known changes to her current symptoms.  She did restart her Eliquis last week with no resumption of hemoptysis.  She denies fevers, new pain, nausea, or bowel issues.  Her  states he has titrated her Senokot to help with constipation from the pain medication.  Her appetite is stable she reports.  She is drinking plenty of fluids.      History of Present Illness patient returns today in follow-up with pathology results to review from right iliac crest biopsy 8/14/2019.  Fortunately we were able to obtain a tissue diagnosis from the biopsy confirming a poorly differentiated carcinoma that is clinically felt to represent metastatic non-small cell lung cancer.  She was able to then begin palliative radiation to her symptomatic right iliac metastatic lesion on 8/15/2019.  Unfortunately she has not experienced any significant improvement in her pain yet.  Despite our lengthy conversation at her last visit, he has been minimizing use of hydrocodone 5/325 due to fears of constipation and has not been using Senokot on a routine basis, using it only intermittently as needed.  She remains in a wheelchair today, mainly related to difficulty with ambulation due to pain from the lesion.  She notes a stable appetite although her weight today has decreased significantly from 194 down to 185 pounds, unclear whether this is accurate.  She does note chronic dyspnea on exertion which is unchanged.    PAST MEDICAL HISTORY:   1. Chronic obstructive pulmonary disease.    2. Right low thoracic dermatome shingles in 02/12.    3. Cystoscopy with bladder biopsy in 05/2012.    4. Psoriasis identified in the right heel in 2013.    5. Osteoarthritis.  6. Colonoscopy 8/4/15  with 5 mm benign rectal polyp.  7. Right sided thyroid nodule.  8. Atrial fibrillation on anticoagulation with Eliquis 5 mg twice a day.  Pacemaker placed 3/13/17.    SURGICAL HISTORY:    1. Left mastectomy in 1998, left breast reconstruction and right breast implant placement in 1999.    2. Exploratory laparotomy.   3. Status post pacemaker placement left chest wall 3/13/17.     ONCOLOGIC HISTORY:  (History from previous dates can be found in the separate document.)  Past Medical History:   Diagnosis Date   • Aortic valve insufficiency    • Atrial fibrillation and flutter (CMS/HCC)    • Axillary lump    • Breast cancer (CMS/HCC)     Left, stage I; ER/NH positive   • Chronic kidney disease     Stage 3   • COPD (chronic obstructive pulmonary disease) (CMS/HCC)    • Dizziness     upon standing   • Fatigue    • Hypertension    • Lung cancer (CMS/HCC)    • Neutropenia (CMS/HCC)    • Osteoarthritis    • Paroxysmal atrial fibrillation (CMS/HCC)    • Peripheral neuropathy     Secondary to cisplatin.   • Pneumonia 06/2019   • Psoriasis     Identified in the right heel in 2013.   • Rectal polyp    • Shingles 02/2012    RIGHT LOW THORACIC    • Small cell lung cancer (CMS/HCC)     Limited stage   • Thyroid nodule     RIGHT   • Wheezing      Past Surgical History:   Procedure Laterality Date   • BREAST IMPLANT SURGERY Right 1999   • BREAST RECONSTRUCTION Left 1999   • BRONCHOSCOPY N/A 7/22/2019    Procedure: BRONCHOSCOPY WITH ENDOBRONCHIAL ULTRASOUND WITH BAL AND BRUSHINGS;  Surgeon: Johnathan Elliott MD;  Location: [facility] ENDOSCOPY;  Service: Pulmonary   • CARDIAC ELECTROPHYSIOLOGY PROCEDURE N/A 3/13/2017    Procedure: Device Implant  DDD pacer  medtronic;  Surgeon: Armani Kennedy MD;  Location: [facility] CATH INVASIVE LOCATION;  Service:    • CATARACT EXTRACTION Bilateral    • COLONOSCOPY     • CYSTOSCOPY BLADDER BIOPSY  05/2012   • EXPLORATORY LAPAROTOMY      Several years ago.   • MASTECTOMY Left 1998   • MEDIPORT  INSERTION, SINGLE     • MEDIPORT REMOVAL Right    • PACEMAKER IMPLANTATION           Current Outpatient Medications on File Prior to Visit   Medication Sig Dispense Refill   • albuterol (PROVENTIL) (5 MG/ML) 0.5% nebulizer solution Take 2.5 mg by nebulization Every 6 (Six) Hours As Needed for Wheezing.     • apixaban (ELIQUIS) 5 MG tablet tablet Take 5 mg by mouth 2 (Two) Times a Day.     • bumetanide (BUMEX) 2 MG tablet Take 2 mg by mouth Daily.     • Fluticasone-Umeclidin-Vilant (TRELEGY ELLIPTA) 100-62.5-25 MCG/INH aerosol powder  Inhale 1 each Daily.     • HYDROcodone-acetaminophen (NORCO)  MG per tablet Take 1 tablet by mouth Every 6 (Six) Hours As Needed for Moderate Pain . 90 tablet 0   • metoprolol tartrate (LOPRESSOR) 25 MG tablet Take 37.5 mg by mouth 2 (Two) Times a Day.     • omeprazole (priLOSEC) 40 MG capsule Take 40 mg by mouth Daily.     • ondansetron (ZOFRAN) 8 MG tablet Take 1 tablet by mouth 3 (Three) Times a Day As Needed for Nausea or Vomiting. 30 tablet 5   • polyethylene glycol (MIRALAX) packet Take 17 g by mouth 2 (Two) Times a Day.     • potassium chloride (K-DUR,KLOR-CON) 20 MEQ CR tablet Take 20 mEq by mouth Daily.     • senna (SENOKOT) 8.6 MG tablet Take 1 tablet by mouth Daily.       No current facility-administered medications on file prior to visit.        ALLERGIES:     Allergies   Allergen Reactions   • Moxifloxacin Nausea Only     Passed out after taking       SOCIAL HISTORY:  .  Drinks approximately three beers every other day.  Long-standing smoking history. No HIV risk factors.  Social History     Socioeconomic History   • Marital status:      Spouse name: Dirk   • Number of children: Not on file   • Years of education: Not on file   • Highest education level: Not on file   Occupational History     Employer: RETIRED   Tobacco Use   • Smoking status: Former Smoker     Start date: 5/26/2011   • Smokeless tobacco: Never Used   • Tobacco comment: quit 6 years  ago    //    caffeine use - one soft drink daily    Substance and Sexual Activity   • Alcohol use: No     Comment: Occasional beer, approximately 3 beers every other day.   • Drug use: No   • Sexual activity: Defer         FAMILY HISTORY:  Positive for melanoma in her mother, diagnosed at the age of 70 and  from heart attack at age of 80.  Father had colon cancer at age 70 and  at age of 80 of abdominal carcinomatosis.    Family History   Problem Relation Age of Onset   • Heart disease Mother    • Heart attack Mother    • Melanoma Mother 70   • Colon cancer Father 70   • Cancer Father         Abdominal carcinomatosis   • Stroke Neg Hx        Review of Systems   Constitutional: Positive for activity change and fatigue. Negative for appetite change.   HENT: Negative for mouth sores, nosebleeds and voice change.    Eyes: Negative for itching.   Respiratory: Positive for shortness of breath (stable). Negative for wheezing.    Cardiovascular: Negative for palpitations and leg swelling.   Gastrointestinal: Negative for abdominal distention, blood in stool, constipation and diarrhea.   Endocrine: Negative for cold intolerance and heat intolerance.   Genitourinary: Negative for difficulty urinating, dysuria, frequency and hematuria.   Musculoskeletal: Positive for arthralgias and gait problem (stable). Negative for back pain and neck pain.   Neurological: Positive for weakness (stable). Negative for dizziness, syncope, light-headedness and numbness.   Hematological: Negative for adenopathy. Does not bruise/bleed easily.   Psychiatric/Behavioral: Negative for confusion and sleep disturbance. The patient is not nervous/anxious.          Objective      Vitals:    19 1140   BP: 95/64   Pulse: 88   Resp: 14   Temp: 97.8 °F (36.6 °C)   SpO2: 91%      Current Status 2019   ECOG score 3     Pain Score    19 1140   PainSc:   8   PainLoc: Leg  Comment: right leg upon standing         Physical Exam    Constitutional: She is oriented to person, place, and time. She appears well-developed and well-nourished.   Elderly female no distress seated in a wheelchair accompanied by her    HENT:   Mouth/Throat: Oropharynx is clear and moist.   Eyes: Conjunctivae are normal.   Neck: No thyromegaly present.   Cardiovascular: Normal rate and regular rhythm. Exam reveals no gallop and no friction rub.   No murmur heard.  Pulmonary/Chest: Effort normal. She has wheezes (expiratory). Right breast exhibits no mass. Left breast exhibits no mass.   Abdominal: Soft. Bowel sounds are normal. She exhibits no distension. There is no tenderness.   Musculoskeletal: She exhibits no edema.   Neurological: She is alert and oriented to person, place, and time. She displays normal reflexes. No cranial nerve deficit. She exhibits normal muscle tone.   Skin: Skin is warm and dry. No rash noted.   Psychiatric: She has a normal mood and affect. Her behavior is normal.       RECENT LABS:  Hematology WBC   Date Value Ref Range Status   09/06/2019 7.70 3.40 - 10.80 10*3/mm3 Final     RBC   Date Value Ref Range Status   09/06/2019 3.66 (L) 3.77 - 5.28 10*6/mm3 Final     Hemoglobin   Date Value Ref Range Status   09/06/2019 10.9 (L) 12.0 - 15.9 g/dL Final     Hematocrit   Date Value Ref Range Status   09/06/2019 35.2 34.0 - 46.6 % Final     Platelets   Date Value Ref Range Status   09/06/2019 258 140 - 450 10*3/mm3 Final        Lab Results   Component Value Date    GLUCOSE 171 (H) 09/06/2019    BUN 20 09/06/2019    CREATININE 1.14 (H) 09/06/2019    EGFRIFNONA 46 (L) 09/06/2019    BCR 17.5 09/06/2019    CO2 25.1 09/06/2019    CALCIUM 9.7 09/06/2019    ALBUMIN 4.00 09/06/2019    AST 12 09/06/2019    ALT 6 09/06/2019         Assessment/Plan      1. Limited stage small cell lung cancer:   · The patient completed concurrent chemoradiation with 6 cycles of cisplatin and -16 in March 2011 and achieved a complete response.   · She did receive PCI  completing this in May 2011.    · The patient showed no evidence of recurrent disease.  2. Metastatic non-small cell lung cancer:  · Annual low-dose screening CT scan of the chest.  Follow-up study from 6/6/18 showed new findings with small to moderate right pleural effusion as well as right pleural thickening and potential lymphadenopathy along the right lower lobe bronchovascular bundle although difficult to identify with certainty.    · Suspicion for potential underlying malignancy. PET scan 6/15/18 showed no significant change in the right pleural effusion with low level activity in the right lower lobe bronchovascular bundle SUV of only 3.7.  There was therefore no definitive suspicious hypermetabolic activity to explain the pleural effusion.    · Diagnostic right thoracentesis on 6/20/18 with 250 cc clear yellow fluid removed with LDH 90, pH 7.5, total protein 1.8, appearing transudative with pathology showing no evidence of malignant cells. Etiology of the findings and pleural effusion unclear.  Patient referred back to cardiology and was started by PCP on Lasix 40 mg daily in July 2018.    · CT chest 7/13/18 with no change and negative/low probability V/Q scan 7/16/18.  · Repeat CT 8/17/18 showed near complete resolution of pleural effusions with only a minimal right pleural effusion remaining and some atelectasis/scarring in the right lower lobe.   · Follow-up CT chest 11/30/2018 with probable scar right lower lobe.  · Patient was hospitalized 5/22-5/27/2019 with suspected viral and bacterial pneumonia.  She did have evidence of metapneumovirus on respiratory viral panel.  CT chest 5/23/2019 showed increased consolidation right base consistent with infiltrate.  There was evidence of scattered groundglass opacity (likely the viral component).  There was an enlarging nodule however with irregular margins left upper lobe associated with left hilum worrisome for malignancy.   · PET scan 7/11/2019 showed  enlarging hypermetabolic left upper lobe/perihilar mass 2.8 cm (SUV 19) with additional 6 mm subpleural left upper lobe lesion (SUV 6.2), right medial 10th rib/costal vertebral activity (SUV 4.5), right iliac 3.5 cm lesion (SUV 11.8), left iliac activity (SUV 5.3).  Activity in distal rectum and collapsed colon of unclear significance.   · CT head (unable to obtain MRI) negative for metastatic disease on 7/24/2019  · Bronchoscopy/EBUS 7/22/2019 unable to reach perihilar mass.  Left upper lobe brushings/washings negative for malignancy.    · CT-guided biopsy left upper lobe mass 8/1/2019- for malignancy.    · CT-guided biopsy right iliac lesion on 8/14/19 with poorly differentiated carcinoma. PDL1 15%, insufficient material for molecular testing.  · Clinically felt to represent metastatic non-small cell lung cancer  · Foundation medicine liquid biopsy pending from 8/21/2019  · Initiated palliative radiation to right iliac lesion 8/15/2019, due to complete on 8/29/2019  · Patient is not a candidate for chemotherapy due to comorbidities.  Discussed single agent palliative immunotherapy with Keytruda versus palliative/supportive care with hospice involvement.  Patient elected to pursue Keytruda.  Plans to initiate treatment 8/30/2019.  · Keytruda initiated 8/30/2019.  3. Cancer related pain:   · Continued significant pain due to metastatic lesion in the right iliac bone  · Completed radiation 8/29/2019.    · Pain medication increased to hydrocodone  on 8/30/2019 with improvement.  4. History of stage I breast cancer on the left:   · The patient underwent a left mastectomy and completed 5 years of tamoxifen in July 2003.    · Exam on 6/8/2019 was negative.    · Annual right mammogram 9/5/18 was negative, BI-RADS 1.    5. 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 This does affect her balance; it is unchanged.   6. Stage III chronic  kidney disease:   · Baseline creatinine in the 1.1-1.2 range.  · Creatinine today 1.14.  7. Atrial fibrillation:   · Pacemaker placement 3/13/17 after having a syncopal episode.  · Patient continuing on Eliquis 5 mg twice daily.  She was instructed to hold her Eliquis secondary to an episode of hemoptysis during a breathing treatment she was seen in the office today.  8. Mild dementia:   · Short-term memory is very poor.  This has been an ongoing issue for her.  Her scans do show significant progression of small vessel ischemic change.  It is felt that some of this has been precipitated by her previous PCI.  9. Sigmoid colon uptake on PET scan:   · PET scan 6/15/2018 did show activity in the distal sigmoid colon/rectum and the patient was referred back to her gastroenterologist Dr. Colton Watkins.    · He did perform a flexible sigmoidoscopy which was negative on 8/2/18 and likely the activity represents muscular activity in the bowel wall.    · Subsequent PET scan 7/11/2019 showed continued activity in the distal rectum SUV 15.9.  There was also activity throughout the colon, possibly suggestive of inflammation/colitis.    · Patient relatively asymptomatic from GI standpoint.    · Patient was not felt to require any additional endoscopic evaluation at this point.  10.  Single episode of hemoptysis.  This occurred during a breathing treatment.  She held her Eliquis and omitted her sodium chloride from her breathing treatment and has not had any further episodes.  She restarted Eliquis without incident.    PLAN:   1. Will restart Eliquis twice daily.  Should she experience additional hemoptysis, she will discontinue and call the office.  2. Continue hydrocodone 10/3/2025 every 6 hours as needed.    3. 9/20/2019 MD visit with CBC, CMP.  Patient will be due for cycle 2 Keytruda.  We will discuss the potential use of monthly Xgeva as well.

## 2019-09-20 NOTE — PROGRESS NOTES
Order placed for referral to HH for PT per VO Dr Almanzar for decreased mobility issues/pain right leg and right shoulder/bone mets/peripheral neuropathy

## 2019-09-21 NOTE — PROGRESS NOTES
Subjective .     REASONS FOR FOLLOWUP:    1. History of stage I left breast cancer in 1998, ER/CO positive, status post mastectomy followed by tamoxifen x 5 years, completed in 07/2003.    2. Limited stage small cell lung cancer with right lower lobe mass, right hilar adenopathy.  Initiation of cisplatin/-16 chemotherapy, cycle 1 initiated 11/17/10.  Concurrent radiation therapy initiated 11/19/10.  Radiation therapy completed 01/11/11.   3. Status post Mediport placement on 12/27/10.  Mediport removed in November 2011.    4. Cycle #6 of cisplatin/-16 chemotherapy initiated 3/1/11.  Complete response seen on subsequent CT scan dated 3/28/11.       5. 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   6. Status post PCI, completed 5-27-11.     7. Right thyroid nodule  8. Cancer related pain receiving hydrocodone 10/325  9. Metastatic non-small cell lung cancer:  · PET scan 7/11/2019 showing enlarging hypermetabolic left upper lobe/perihilar mass 2.8 cm (SUV 19) with additional 6 mm subpleural left upper lobe lesion (SUV 6.2), right medial 10th rib/costal vertebral activity (SUV 4.5), right iliac 3.5 cm lesion (SUV 11.8), left iliac activity (SUV 5.3).  Activity in distal rectum and collapsed colon of unclear significance.   · CT head (unable to obtain MRI) negative for metastatic disease on 7/24/2019  · Bronchoscopy/EBUS 7/22/2019 unable to reach perihilar mass.  Left upper lobe brushings/washings negative for malignancy.    · CT-guided biopsy left upper lobe mass 8/1/2019 negative for malignancy.    · CT-guided biopsy right iliac lesion on 8/14/19 with poorly differentiated carcinoma. PDL1 15%, insufficient material for molecular testing.  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  Will need to discuss possible referral to genetics clinic to help determine  if REEMA mutation is germline for benefit of other family members.  · Initiated palliative radiation to right iliac lesion 8/15/2019, completed on 8/29/2019  · Patient is not a candidate for chemotherapy due to comorbidities.  Discussed single agent palliative immunotherapy with Keytruda versus palliative/supportive care with hospice involvement.  Patient elected to pursue Keytruda, initiated treatment on 8/30/2019.  · Addition of Xgeva 9/28/2019 with plans to administer every 6 weeks to coincide with Keytruda schedule.    HISTORY OF PRESENT ILLNESS:  The patient is a 73 y.o. year old female who is here for follow-up with the above-mentioned history.    History of Present Illness the patient returns today in follow-up due for cycle 2 Keytruda and also to initiate Xgeva.  The patient has had a number of issues since the last visit.  He remains in a wheelchair today.  She reports difficulty ambulating due to pain.  It is somewhat difficult to determine the exact location of the pain.  She reports that previous pain around her right iliac region has nearly resolved following radiation therapy however her current pain seems to be more around the right thigh.  Reports that it is increased when standing.  She also has left shoulder pain that has worsened over the past few weeks and notes that this is worse when trying to get out of her wheelchair or recliner.  She does continue on anticoagulation with Eliquis 5 mg twice daily due to atrial fibrillation.  She notes the recent development of epistaxis, lasting usually 5-10 minutes and occurring every day for the past 3 days, before that a few times per week. She cannot tell if it is coming from one side in particular. She continues to take intermittent hydrocodone 10/325, usually 4 doses per day with partial relief of pain. Constipation currently controlled with coconut oil intermittently.    PAST MEDICAL HISTORY:   1. Chronic obstructive pulmonary disease.    2. Right low  thoracic dermatome shingles in 02/12.    3. Cystoscopy with bladder biopsy in 05/2012.    4. Psoriasis identified in the right heel in 2013.    5. Osteoarthritis.  6. Colonoscopy 8/4/15 with 5 mm benign rectal polyp.  7. Right sided thyroid nodule.  8. Atrial fibrillation on anticoagulation with Eliquis 5 mg twice a day.  Pacemaker placed 3/13/17.    SURGICAL HISTORY:    1. Left mastectomy in 1998, left breast reconstruction and right breast implant placement in 1999.    2. Exploratory laparotomy.   3. Status post pacemaker placement left chest wall 3/13/17.     ONCOLOGIC HISTORY:  (History from previous dates can be found in the separate document.)  Past Medical History:   Diagnosis Date   • Aortic valve insufficiency    • Atrial fibrillation and flutter (CMS/HCC)    • Axillary lump    • Breast cancer (CMS/HCC)     Left, stage I; ER/MI positive   • Chronic kidney disease     Stage 3   • COPD (chronic obstructive pulmonary disease) (CMS/HCC)    • Dizziness     upon standing   • Fatigue    • Hypertension    • Lung cancer (CMS/HCC)    • Neutropenia (CMS/HCC)    • Osteoarthritis    • Paroxysmal atrial fibrillation (CMS/HCC)    • Peripheral neuropathy     Secondary to cisplatin.   • Pneumonia 06/2019   • Psoriasis     Identified in the right heel in 2013.   • Rectal polyp    • Shingles 02/2012    RIGHT LOW THORACIC    • Small cell lung cancer (CMS/HCC)     Limited stage   • Thyroid nodule     RIGHT   • Wheezing      Past Surgical History:   Procedure Laterality Date   • BREAST IMPLANT SURGERY Right 1999   • BREAST RECONSTRUCTION Left 1999   • BRONCHOSCOPY N/A 7/22/2019    Procedure: BRONCHOSCOPY WITH ENDOBRONCHIAL ULTRASOUND WITH BAL AND BRUSHINGS;  Surgeon: Johnathan Elliott MD;  Location: [facility] ENDOSCOPY;  Service: Pulmonary   • CARDIAC ELECTROPHYSIOLOGY PROCEDURE N/A 3/13/2017    Procedure: Device Implant  DDD pacer  medtronic;  Surgeon: Armani Kennedy MD;  Location: [facility] CATH INVASIVE LOCATION;  Service:     • CATARACT EXTRACTION Bilateral    • COLONOSCOPY     • CYSTOSCOPY BLADDER BIOPSY  05/2012   • EXPLORATORY LAPAROTOMY      Several years ago.   • MASTECTOMY Left 1998   • MEDIPORT INSERTION, SINGLE     • MEDIPORT REMOVAL Right    • PACEMAKER IMPLANTATION           Current Outpatient Medications on File Prior to Visit   Medication Sig Dispense Refill   • albuterol (PROVENTIL) (5 MG/ML) 0.5% nebulizer solution Take 2.5 mg by nebulization Every 6 (Six) Hours As Needed for Wheezing.     • apixaban (ELIQUIS) 5 MG tablet tablet Take 5 mg by mouth 2 (Two) Times a Day.     • bumetanide (BUMEX) 2 MG tablet Take 2 mg by mouth Daily.     • Fluticasone-Umeclidin-Vilant (TRELEGY ELLIPTA) 100-62.5-25 MCG/INH aerosol powder  Inhale 1 each Daily.     • metoprolol tartrate (LOPRESSOR) 25 MG tablet Take 37.5 mg by mouth 2 (Two) Times a Day.     • omeprazole (priLOSEC) 40 MG capsule Take 40 mg by mouth Daily.     • ondansetron (ZOFRAN) 8 MG tablet Take 1 tablet by mouth 3 (Three) Times a Day As Needed for Nausea or Vomiting. 30 tablet 5   • polyethylene glycol (MIRALAX) packet Take 17 g by mouth 2 (Two) Times a Day.     • potassium chloride (K-DUR,KLOR-CON) 20 MEQ CR tablet Take 20 mEq by mouth Daily.     • senna (SENOKOT) 8.6 MG tablet Take 1 tablet by mouth Daily.     • [DISCONTINUED] HYDROcodone-acetaminophen (NORCO)  MG per tablet Take 1 tablet by mouth Every 6 (Six) Hours As Needed for Moderate Pain . 90 tablet 0     No current facility-administered medications on file prior to visit.        ALLERGIES:     Allergies   Allergen Reactions   • Moxifloxacin Nausea Only     Passed out after taking       SOCIAL HISTORY:  .  Drinks approximately three beers every other day.  Long-standing smoking history. No HIV risk factors.  Social History     Socioeconomic History   • Marital status:      Spouse name: Dirk   • Number of children: Not on file   • Years of education: Not on file   • Highest education level:  Not on file   Occupational History     Employer: RETIRED   Tobacco Use   • Smoking status: Former Smoker     Start date: 2011   • Smokeless tobacco: Never Used   • Tobacco comment: quit 6 years ago    //    caffeine use - one soft drink daily    Substance and Sexual Activity   • Alcohol use: No     Comment: Occasional beer, approximately 3 beers every other day.   • Drug use: No   • Sexual activity: Defer         FAMILY HISTORY:  Positive for melanoma in her mother, diagnosed at the age of 70 and  from heart attack at age of 80.  Father had colon cancer at age 70 and  at age of 80 of abdominal carcinomatosis.    Family History   Problem Relation Age of Onset   • Heart disease Mother    • Heart attack Mother    • Melanoma Mother 70   • Colon cancer Father 70   • Cancer Father         Abdominal carcinomatosis   • Stroke Neg Hx        Review of Systems   Constitutional: Positive for activity change, fatigue and unexpected weight change. Negative for appetite change.   HENT: Positive for nosebleeds. Negative for mouth sores and voice change.    Eyes: Negative for itching.   Respiratory: Positive for shortness of breath. Negative for wheezing.    Cardiovascular: Negative for palpitations and leg swelling.   Gastrointestinal: Positive for constipation. Negative for abdominal distention, blood in stool and diarrhea.   Endocrine: Negative for cold intolerance and heat intolerance.   Genitourinary: Negative for difficulty urinating, dysuria, frequency and hematuria.   Musculoskeletal: Positive for arthralgias and gait problem. Negative for back pain and neck pain.   Neurological: Positive for weakness. Negative for dizziness, syncope, light-headedness and numbness.   Hematological: Negative for adenopathy. Does not bruise/bleed easily.   Psychiatric/Behavioral: Negative for confusion and sleep disturbance. The patient is not nervous/anxious.          Objective      Vitals:    19 1103   BP: 110/70   Pulse:  93   Resp: 16   Temp: 98.3 °F (36.8 °C)   SpO2: 93%      Current Status 9/20/2019   ECOG score 1   Pain 0/10    Physical Exam   Constitutional: She is oriented to person, place, and time. She appears well-developed and well-nourished.   Elderly female no distress seated in a wheelchair   HENT:   Mouth/Throat: Oropharynx is clear and moist.   Eyes: Conjunctivae are normal.   Neck: No thyromegaly present.   Cardiovascular: Normal rate and regular rhythm. Exam reveals no gallop and no friction rub.   No murmur heard.  Pulmonary/Chest: No respiratory distress. She has wheezes. Right breast exhibits no mass. Left breast exhibits no mass.   A few scattered wheezes, mainly on the right   Abdominal: Soft. Bowel sounds are normal. She exhibits no distension. There is no tenderness.   Musculoskeletal: She exhibits no edema.   Lymphadenopathy:        Head (right side): No submandibular adenopathy present.     She has no cervical adenopathy.     She has no axillary adenopathy.        Right: No inguinal and no supraclavicular adenopathy present.        Left: No inguinal and no supraclavicular adenopathy present.   Neurological: She is alert and oriented to person, place, and time. She displays normal reflexes. No cranial nerve deficit. She exhibits normal muscle tone.   Skin: Skin is warm and dry. No rash noted.   Psychiatric: She has a normal mood and affect. Her behavior is normal.   The patient was examined today, unchanged from above.    RECENT LABS:  Hematology WBC   Date Value Ref Range Status   09/20/2019 10.63 3.40 - 10.80 10*3/mm3 Final     RBC   Date Value Ref Range Status   09/20/2019 3.56 (L) 3.77 - 5.28 10*6/mm3 Final     Hemoglobin   Date Value Ref Range Status   09/20/2019 10.4 (L) 12.0 - 15.9 g/dL Final     Hematocrit   Date Value Ref Range Status   09/20/2019 34.1 34.0 - 46.6 % Final     Platelets   Date Value Ref Range Status   09/20/2019 318 140 - 450 10*3/mm3 Final        Lab Results   Component Value Date     GLUCOSE 137 (H) 09/20/2019    BUN 18 09/20/2019    CREATININE 1.16 (H) 09/20/2019    EGFRIFNONA 46 (L) 09/20/2019    BCR 15.5 09/20/2019    CO2 25.6 09/20/2019    CALCIUM 9.7 09/20/2019    ALBUMIN 4.10 09/20/2019    AST 10 09/20/2019    ALT 7 09/20/2019     Plain film left shoulder and right femur normal today      Assessment/Plan      1. Limited stage small cell lung cancer:   · The patient completed concurrent chemoradiation with 6 cycles of cisplatin and -16 in March 2011 and achieved a complete response.   · She did receive PCI completing this in May 2011.    · The patient showed no evidence of recurrent disease.  2. Metastatic non-small cell lung cancer:  · Annual low-dose screening CT scan of the chest.  Follow-up study from 6/6/18 showed new findings with small to moderate right pleural effusion as well as right pleural thickening and potential lymphadenopathy along the right lower lobe bronchovascular bundle although difficult to identify with certainty.    · Suspicion for potential underlying malignancy. PET scan 6/15/18 showed no significant change in the right pleural effusion with low level activity in the right lower lobe bronchovascular bundle SUV of only 3.7.  There was therefore no definitive suspicious hypermetabolic activity to explain the pleural effusion.    · Diagnostic right thoracentesis on 6/20/18 with 250 cc clear yellow fluid removed with LDH 90, pH 7.5, total protein 1.8, appearing transudative with pathology showing no evidence of malignant cells. Etiology of the findings and pleural effusion unclear.  Patient referred back to cardiology and was started by PCP on Lasix 40 mg daily in July 2018.    · CT chest 7/13/18 with no change and negative/low probability V/Q scan 7/16/18.  · Repeat CT 8/17/18 showed near complete resolution of pleural effusions with only a minimal right pleural effusion remaining and some atelectasis/scarring in the right lower lobe.   · Follow-up CT chest  11/30/2018 with probable scar right lower lobe.  · Patient was hospitalized 5/22-5/27/2019 with suspected viral and bacterial pneumonia.  She did have evidence of metapneumovirus on respiratory viral panel.  CT chest 5/23/2019 showed increased consolidation right base consistent with infiltrate.  There was evidence of scattered groundglass opacity (likely the viral component).  There was an enlarging nodule however with irregular margins left upper lobe associated with left hilum worrisome for malignancy.   · PET scan 7/11/2019 showed enlarging hypermetabolic left upper lobe/perihilar mass 2.8 cm (SUV 19) with additional 6 mm subpleural left upper lobe lesion (SUV 6.2), right medial 10th rib/costal vertebral activity (SUV 4.5), right iliac 3.5 cm lesion (SUV 11.8), left iliac activity (SUV 5.3).  Activity in distal rectum and collapsed colon of unclear significance.   · CT head (unable to obtain MRI) negative for metastatic disease on 7/24/2019  · Bronchoscopy/EBUS 7/22/2019 unable to reach perihilar mass.  Left upper lobe brushings/washings negative for malignancy.    · CT-guided biopsy left upper lobe mass 8/1/2019- for malignancy.    · CT-guided biopsy right iliac lesion on 8/14/19 with poorly differentiated carcinoma. PDL1 15%, insufficient material for molecular testing.  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  Will need to discuss possible referral to genetics clinic to help determine if REEMA mutation is germline for benefit of other family members.  · Initiated palliative radiation to right iliac lesion 8/15/2019, completed on 8/29/2019  · Patient is not a candidate for chemotherapy due to comorbidities.  Discussed single agent palliative immunotherapy with Keytruda versus palliative/supportive care with hospice involvement.  Patient elected to pursue Keytruda, initiated  treatment 8/30/2019.  · Added Xgeva 9/20/2019, plan to treat every 6 weeks to coincide with Keytruda schedule.  · The patient returns today in follow-up due for cycle 2 Keytruda and to initiate Xgeva.  She has not experienced any side effects from Keytruda at this point.  She has had continued difficulty with her mobility related to pain.  As noted above it is somewhat difficult to determine the exact location of pain.  It does seem that her right iliac pain has improved significantly after radiation.  Her current pain seems to be more focused around her upper right leg.  We did obtain plain film of the right femur today which showed no significant bony abnormalities.  She is also experiencing pain in her left shoulder, again with plain film today showing no significant bony abnormalities.  Her left shoulder pain occurs mainly when trying to get out of the wheelchair or recliner.  She is inquiring about home physical therapy and this may be of benefit and we will make a referral today.  As noted above, we will add in Xgeva today and we did discuss risk of osteonecrosis of the jaw.  We will plan to treat every 6 weeks to coincide with Keytruda dosing.  She will return in 3 weeks for nurse practitioner visit and cycle 3 Keytruda.  In 5 weeks she will undergo repeat PET scan and in 6 weeks I will see her back in follow-up when she will be due for cycle 4.  3. Cancer related pain:   · Continued significant pain due to metastatic lesion in the right iliac bone  · Completed palliative radiation right iliac lesion 8/29/2019  · Improvement in right iliac pain following radiation.  Currently, patient with pain involving shoulder.  We did pursue plain film of both areas today showing no significant bony abnormalities.  Patient is requiring currently or doses hydrocodone 10/325/day with we discussed her code own somewhat ahead of schedule to keep pain under improved control.  Discussed potential use of long-acting narcotics if  the pain worsens would likely begin with low-dose 12 mcg Duragesic patch if needed.  For now, continue hydrocodone and refill provided, #90.  4. Narcotic induced constipation:  · Currently controlled with coconut oil alone  5. History of stage I breast cancer on the left:   · The patient underwent a left mastectomy and completed 5 years of tamoxifen in July 2003.    · Exam on 6/8/2019 was negative.    · Annual right mammogram 9/5/18 was negative, BI-RADS 1.    · We are not pursuing further routine mammography due to patient's metastatic lung cancer.  6. 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 This does affect her balance; it is unchanged.   7. Stage III chronic kidney disease:   · Baseline creatinine in the 1.1-1.2 range.  · Creatinine today 1.16  8. Atrial fibrillation:   · Pacemaker placement 3/13/17 after having a syncopal episode.  · Patient continuing on Eliquis 5 mg twice daily.   9. Mild dementia:   · Short-term memory is very poor.  This has been an ongoing issue for her.  Her scans do show significant progression of small vessel ischemic change.  It is felt that some of this has been precipitated by her previous PCI.  10. Sigmoid colon uptake on PET scan:   · PET scan 6/15/2018 did show activity in the distal sigmoid colon/rectum and the patient was referred back to her gastroenterologist Dr. Colton Watkins.    · He did perform a flexible sigmoidoscopy which was negative on 8/2/18 and likely the activity represents muscular activity in the bowel wall.    · Subsequent PET scan 7/11/2019 showed continued activity in the distal rectum SUV 15.9.  There was also activity throughout the colon, possibly suggestive of inflammation/colitis.    · Patient relatively asymptomatic from GI standpoint.    · Patient was not felt to require any additional endoscopic evaluation at this point.  11. Recurrent epistaxis:  · Patient is continuing  on Eliquis for atrial fibrillation, 5 mg twice daily  · Patient reports recent difficulty with recurrent epistaxis unclear whether unilateral or bilateral.  · We will refer the patient to ENT to evaluate need for cauterization.  She has previously seen Dr. Dos Santos.    PLAN:     1. Proceed with cycle 2 Keytruda today.  2. Initiate Xgeva today with plans to administer every 6 weeks to coincide with Keytruda schedule.  3. Refill prescription for hydrocodone 10/325, #90  4. Referral for home physical therapy  5. We did obtain plain films today of left shoulder and right humerus as outlined above we will notify patient of results.  6. Referral to ENT regarding recurrent epistaxis.  7. We will plan to discuss foundation medicine liquid biopsy results at next visit in regards to potential implications for germline REEMA mutation and possible need for evaluation in genetics clinic.  8. In 3 weeks nurse practitioner visit with CBC, CMP, TSH, free T4 and cycle 3 Keytruda.  9. In 5 weeks PET scan  10. In 6 weeks MD visit with CBC, CMP, magnesium, phosphorus and cycle 4 Keytruda pending PET scan results.  Patient will also be due for Xgeva (every 6 weeks).    I did spend 45 minutes with the patient today, 40 minutes in face-to-face consultation and coordination of care reviewing issues outlined in the assessment and plan above.  Patient and family members had multiple questions.  I answered them to their satisfaction.

## 2019-10-04 NOTE — ED PROVIDER NOTES
EMERGENCY DEPARTMENT ENCOUNTER    Room Number:  38/38  Date seen:  10/5/2019  Time seen: 7:55 PM  PCP: Anitha Haynes MD   Oncology: Dr. Almanzar  Historian: patient      HPI:  Chief Complaint: shortness of breath  A complete HPI/ROS/PMH/PSH/SH/FH are unobtainable due to: nothing  Context: Hui Workman is a 73 y.o. female who presents to the ED c/o shortness of breath that began yesterday and has gradually worsened. The patient also complains of a productive cough with green or yellow sputum. The patient denies fever or chest pain. The patient denies any recent ill contact. The patient reports she called her PMD earlier today who called in Cefdinir. The patient reports she has taken one dosage of the Cefdinir. The patient reports she has a hx of lung cancer for which she is actively receiving Keytruda. The patient reports she also has a hx of COPD. The patient is not chronically on supplemental oxygen. The patient reports she is a former smoker. There are no other complaints at this time. Her family is bedside.    Location: respiratory  Quality: shortness of breath  Intensity/Severity: moderate  Duration: since yesterday  Onset quality: gradual  Timing: constant  Progression: gradually worsened  Aggravating Factors: none specified  Alleviating Factors: none specified  Previous Episodes: none specified  Treatment before arrival: The patient reports she called her PMD earlier today who called in Cefdinir. The patient reports she has taken one dosage of the Cefdinir.  Associated Symptoms: a productive cough with green or yellow sputum    PAST MEDICAL HISTORY  Active Ambulatory Problems     Diagnosis Date Noted   • Examination 07/06/2016   • History of left breast cancer 07/06/2016   • Small cell carcinoma of right lung (CMS/HCC) 07/06/2016   • Chemotherapy-induced neuropathy (CMS/HCC) 07/06/2016   • Thyroid nodule 07/06/2016   • Neck pain on right side 12/14/2016   • Aortic valve insufficiency 05/26/2017   •  Paroxysmal atrial fibrillation (CMS/HCC) 05/26/2017   • Chronic fatigue 05/31/2017   • Pleural effusion, right 06/27/2018   • Pneumonia due to infectious organism 05/22/2019   • History of lung cancer 05/24/2019   • Bone metastasis (CMS/HCC) 08/07/2019   • Non-small cell cancer of left lung (CMS/HCC) 08/21/2019   • High risk medication use      Resolved Ambulatory Problems     Diagnosis Date Noted   • No Resolved Ambulatory Problems     Past Medical History:   Diagnosis Date   • Aortic valve insufficiency    • Atrial fibrillation and flutter (CMS/HCC)    • Axillary lump    • Breast cancer (CMS/HCC)    • Chronic kidney disease    • COPD (chronic obstructive pulmonary disease) (CMS/HCC)    • Dizziness    • Fatigue    • Hypertension    • Lung cancer (CMS/HCC)    • Neutropenia (CMS/HCC)    • Osteoarthritis    • Paroxysmal atrial fibrillation (CMS/HCC)    • Peripheral neuropathy    • Pneumonia 06/2019   • Psoriasis    • Rectal polyp    • Shingles 02/2012   • Small cell lung cancer (CMS/HCC)    • Thyroid nodule    • Wheezing          PAST SURGICAL HISTORY  Past Surgical History:   Procedure Laterality Date   • BREAST IMPLANT SURGERY Right 1999   • BREAST RECONSTRUCTION Left 1999   • BRONCHOSCOPY N/A 7/22/2019    Procedure: BRONCHOSCOPY WITH ENDOBRONCHIAL ULTRASOUND WITH BAL AND BRUSHINGS;  Surgeon: Johnathan Elliott MD;  Location: [facility] ENDOSCOPY;  Service: Pulmonary   • CARDIAC ELECTROPHYSIOLOGY PROCEDURE N/A 3/13/2017    Procedure: Device Implant  DDD pacer  medtronic;  Surgeon: Armani Kennedy MD;  Location: [facility] CATH INVASIVE LOCATION;  Service:    • CATARACT EXTRACTION Bilateral    • COLONOSCOPY     • CYSTOSCOPY BLADDER BIOPSY  05/2012   • EXPLORATORY LAPAROTOMY      Several years ago.   • MASTECTOMY Left 1998   • MEDIPORT INSERTION, SINGLE     • MEDIPORT REMOVAL Right    • PACEMAKER IMPLANTATION           FAMILY HISTORY  Family History   Problem Relation Age of Onset   • Heart disease Mother    • Heart  attack Mother    • Melanoma Mother 70   • Colon cancer Father 70   • Cancer Father         Abdominal carcinomatosis   • Stroke Neg Hx          SOCIAL HISTORY  Social History     Socioeconomic History   • Marital status:      Spouse name: Dirk   • Number of children: Not on file   • Years of education: Not on file   • Highest education level: Not on file   Occupational History     Employer: RETIRED   Tobacco Use   • Smoking status: Former Smoker     Start date: 5/26/2011   • Smokeless tobacco: Never Used   • Tobacco comment: quit 6 years ago    //    caffeine use - one soft drink daily    Substance and Sexual Activity   • Alcohol use: No     Comment: Occasional beer, approximately 3 beers every other day.   • Drug use: No   • Sexual activity: Defer         ALLERGIES  Moxifloxacin        REVIEW OF SYSTEMS  Review of Systems     All systems reviewed and negative except for those discussed in HPI.       PHYSICAL EXAM  ED Triage Vitals [10/04/19 1904]   Temp Heart Rate Resp BP SpO2   98.4 °F (36.9 °C) 104 20 -- 96 %      Temp src Heart Rate Source Patient Position BP Location FiO2 (%)   -- -- -- -- --         GENERAL: not distressed  HENT: nares patent  EYES: no scleral icterus  CV: regular rhythm, regular rate  RESPIRATORY: normal effort, inspiratory and expiratory wheezing bilaterally  ABDOMEN: soft, nontender   MUSCULOSKELETAL: no deformity, no LE edema  NEURO: alert, moves all extremities, follows commands  SKIN: warm, dry    Vital signs and nursing notes reviewed.      LAB RESULTS  Recent Results (from the past 24 hour(s))   Respiratory Panel, PCR - Swab, Nasopharynx    Collection Time: 10/04/19  8:19 PM   Result Value Ref Range    ADENOVIRUS, PCR Not Detected Not Detected    Coronavirus 229E Not Detected Not Detected    Coronavirus HKU1 Not Detected Not Detected    Coronavirus NL63 Not Detected Not Detected    Coronavirus OC43 Not Detected Not Detected    Human Metapneumovirus Not Detected Not Detected     Human Rhinovirus/Enterovirus Not Detected Not Detected    Influenza B PCR Not Detected Not Detected    Parainfluenza Virus 1 Not Detected Not Detected    Parainfluenza Virus 2 Not Detected Not Detected    Parainfluenza Virus 3 Detected (A) Not Detected    Parainfluenza Virus 4 Not Detected Not Detected    Bordetella pertussis pcr Not Detected Not Detected    Influenza A H1 2009 PCR Not Detected Not Detected    Chlamydophila pneumoniae PCR Not Detected Not Detected    Mycoplasma pneumo by PCR Not Detected Not Detected    Influenza A PCR Not Detected Not Detected    Influenza A H3 Not Detected Not Detected    Influenza A H1 Not Detected Not Detected    RSV, PCR Not Detected Not Detected    Bordetella parapertussis PCR Not Detected Not Detected   Comprehensive Metabolic Panel    Collection Time: 10/04/19  8:34 PM   Result Value Ref Range    Glucose 119 (H) 65 - 99 mg/dL    BUN 19 8 - 23 mg/dL    Creatinine 1.07 (H) 0.57 - 1.00 mg/dL    Sodium 133 (L) 136 - 145 mmol/L    Potassium 3.7 3.5 - 5.2 mmol/L    Chloride 94 (L) 98 - 107 mmol/L    CO2 24.9 22.0 - 29.0 mmol/L    Calcium 9.0 8.6 - 10.5 mg/dL    Total Protein 7.8 6.0 - 8.5 g/dL    Albumin 4.30 3.50 - 5.20 g/dL    ALT (SGPT) 8 1 - 33 U/L    AST (SGOT) 9 1 - 32 U/L    Alkaline Phosphatase 117 39 - 117 U/L    Total Bilirubin 0.4 0.2 - 1.2 mg/dL    eGFR Non African Amer 50 (L) >60 mL/min/1.73    Globulin 3.5 gm/dL    A/G Ratio 1.2 g/dL    BUN/Creatinine Ratio 17.8 7.0 - 25.0    Anion Gap 14.1 5.0 - 15.0 mmol/L   BNP    Collection Time: 10/04/19  8:34 PM   Result Value Ref Range    proBNP 2,054.0 (H) 5.0-1,800.0 pg/mL   Troponin    Collection Time: 10/04/19  8:34 PM   Result Value Ref Range    Troponin T <0.010 0.000 - 0.030 ng/mL   CBC Auto Differential    Collection Time: 10/04/19  8:34 PM   Result Value Ref Range    WBC 6.31 3.40 - 10.80 10*3/mm3    RBC 3.28 (L) 3.77 - 5.28 10*6/mm3    Hemoglobin 9.5 (L) 12.0 - 15.9 g/dL    Hematocrit 29.8 (L) 34.0 - 46.6 %    MCV  90.9 79.0 - 97.0 fL    MCH 29.0 26.6 - 33.0 pg    MCHC 31.9 31.5 - 35.7 g/dL    RDW 14.6 12.3 - 15.4 %    RDW-SD 49.1 37.0 - 54.0 fl    MPV 8.9 6.0 - 12.0 fL    Platelets 314 140 - 450 10*3/mm3    Neutrophil % 75.3 42.7 - 76.0 %    Lymphocyte % 12.8 (L) 19.6 - 45.3 %    Monocyte % 8.1 5.0 - 12.0 %    Eosinophil % 2.5 0.3 - 6.2 %    Basophil % 0.5 0.0 - 1.5 %    Immature Grans % 0.8 (H) 0.0 - 0.5 %    Neutrophils, Absolute 4.75 1.70 - 7.00 10*3/mm3    Lymphocytes, Absolute 0.81 0.70 - 3.10 10*3/mm3    Monocytes, Absolute 0.51 0.10 - 0.90 10*3/mm3    Eosinophils, Absolute 0.16 0.00 - 0.40 10*3/mm3    Basophils, Absolute 0.03 0.00 - 0.20 10*3/mm3    Immature Grans, Absolute 0.05 0.00 - 0.05 10*3/mm3    nRBC 0.0 0.0 - 0.2 /100 WBC   Blood Gas, Arterial    Collection Time: 10/04/19 10:41 PM   Result Value Ref Range    Site Arterial: right radial     Lio's Test Positive     pH, Arterial 7.420 7.350 - 7.450 pH units    pCO2, Arterial 38.8 35.0 - 45.0 mm Hg    pO2, Arterial 78.9 (L) 80.0 - 100.0 mm Hg    HCO3, Arterial 25.2 22.0 - 28.0 mmol/L    Base Excess, Arterial 0.7 0.0 - 2.0 mmol/L    O2 Saturation Calculated 95.8 92.0 - 99.0 %    Barometric Pressure for Blood Gas 755.2 mmHg    Modality Room Air     Rate 24 Breaths/minute       Ordered the above labs and reviewed the results.        RADIOLOGY  Xr Chest 2 View    Result Date: 10/4/2019  XR CHEST 2 VW-  10/04/2019  HISTORY: Shortness of breath, cough.  FINDINGS: Heart size is within normal limits. Bilateral breast implants are faintly visualized. There appears to be some chronic atelectasis of the right lower lung also seen on the previous chest radiograph of 08/01/2019. Small left pleural effusion is seen. There is some ill-defined increased density in the right suprahilar region which appeared more masslike on CT of the chest from 07/11/2019. Cardiac pacemaker is seen in good position.      1. Atelectasis of the medial aspect of the right lung base. 2. Masslike  opacity in the left suprahilar region. 3. Small left pleural effusion. 4. Please see additional dictation for the PET/CT scan from 07/11/2019.         I ordered the above noted radiological studies. Reviewed by me and discussed with radiologist.  See dictation for official radiology interpretation.        PROCEDURES  Procedures      MEDICATIONS GIVEN IN ER  Medications   sodium chloride 0.9 % flush 10 mL (not administered)   albuterol (PROVENTIL) nebulizer solution 0.5% 2.5 mg/0.5mL (not administered)   methylPREDNISolone sodium succinate (SOLU-Medrol) injection 125 mg (125 mg Intravenous Given 10/4/19 2035)   ipratropium-albuterol (DUO-NEB) nebulizer solution 3 mL (3 mL Nebulization Given 10/4/19 2151)   magnesium sulfate 2g/50 mL (PREMIX) infusion (0 g Intravenous Stopped 10/4/19 2319)   azithromycin (ZITHROMAX) tablet 500 mg (500 mg Oral Given 10/4/19 2317)         PROGRESS, DATA ANALYSIS, CONSULTS, AND MEDICAL DECISION MAKING    All labs have been independently reviewed by me.  All radiology studies have been reviewed by me and discussed with radiologist dictating the report.   EKG's independently viewed and interpreted by me.  Discussion below represents my analysis of pertinent findings related to patient's condition, differential diagnosis, treatment plan and final disposition.      ED Course as of Oct 05 0017   Fri Oct 04, 2019   2024 EKG interpreted by myself.  Time 2012.  Atrial fibrillation.  Heart rate 84.  Low voltage in the precordial leads.  Prolonged R wave progression.  No acute ST changes.  [TD]   2025 On medical chart review, and old EKG was obtained from 5/27/2019.  This shows atrial fibrillation.  Heart rate 93.  Low voltage.  [TD]   2053 Chest x-ray shows masslike opacity in the left suprahilar region.  Small left pleural effusion.  There is atelectasis in the medial aspect of the right lung base.  [TD]   2055 On medical chart review, patient seen 9/20/2019 by Dr. Almanzar.  She has limited stage  small cell lung cancer, metastatic non-small cell lung cancer.  Patient is on Keytruda and Xgeva.  [TD]   2055 Patient is on Eliquis.  [TD]   2150 Parainfluenza Virus 3: (!) Detected [TD]   2220 proBNP: (!) 2,054.0 [TD]   2248 I placed a consult for [facility].  [TD]   2304 Patient wants to go home.  I recommend admission to the patient given her positive parainfluenza test, COPD exacerbation and known malignancy.  However, she is of sound mind and judgment.  She is a risk for decompensation.  She will return to emergency department should she develop any worsening symptoms.  [TD]   2304 Family states that she is not ambulatory at baseline.  [TD]   2305 Patient looks quite a bit better after repeat evaluation.  She seemed to be wheezing more at first but now has better breathing at this time.  I suspect either rebound from her DuoNeb treatment  [TD]   2305  or increased air movement which caused increased wheezing sounding.  [TD]      ED Course User Index  [TD] Titus Rand II, MD       AS OF 12:17 AM VITALS:    BP - 110/77  HR - 87  TEMP - 98.4 °F (36.9 °C)  02 SATS - 91%        DIAGNOSIS  Final diagnoses:   Infection due to parainfluenza virus 3   Primary malignant neoplasm of lung metastatic to other site, unspecified laterality (CMS/HCC)   COPD with acute exacerbation (CMS/HCC)   Hyponatremia         DISPOSITION  DISCHARGE    FOLLOW-UP  Anitha Haynes MD  [address] [address]  [phone]    Schedule an appointment as soon as possible for a visit   If symptoms worsen         Medication List      New Prescriptions    azithromycin 250 MG tablet  Commonly known as:  ZITHROMAX  Take 1 tablet by mouth Daily for 4 days. Take 2 tablets the first day,   then 1 tablet daily for 4 days.     predniSONE 50 MG tablet  Commonly known as:  DELTASONE  Take 1 tablet by mouth Daily for 5 days.            --  Documentation assistance provided by chata Lan for Dr. Titus Rand MD.   Information recorded by the scribe was done at my direction and has been verified and validated by me.       Korin Lan  10/04/19 2051       Titus Rand II, MD  10/05/19 0018

## 2019-10-04 NOTE — TELEPHONE ENCOUNTER
Daughter calling stating that mother is congested, SOA, and she is afraid she may have pneumonia.  Encouraged daughter to take mother to ER.  She was requesting Dr. Almanzar.  Explained he is rounding in the hospital.  Daughter is going to try to call another MD familiar with her mother.  If they cannot get in to be seen there, then they will proceed to the ER.

## 2019-10-04 NOTE — TELEPHONE ENCOUNTER
----- Message from Hellen Noonan sent at 10/4/2019  3:16 PM EDT -----  Regarding: afraid her mom may have Pneumonia   Contact: [phone]  Anitha is her daughter  She said   Her Mom  Called earlier

## 2019-10-11 NOTE — PROGRESS NOTES
Subjective .     REASONS FOR FOLLOWUP:    1. History of stage I left breast cancer in 1998, ER/OH positive, status post mastectomy followed by tamoxifen x 5 years, completed in 07/2003.    2. Limited stage small cell lung cancer with right lower lobe mass, right hilar adenopathy.  Initiation of cisplatin/-16 chemotherapy, cycle 1 initiated 11/17/10.  Concurrent radiation therapy initiated 11/19/10.  Radiation therapy completed 01/11/11.   3. Status post Mediport placement on 12/27/10.  Mediport removed in November 2011.    4. Cycle #6 of cisplatin/-16 chemotherapy initiated 3/1/11.  Complete response seen on subsequent CT scan dated 3/28/11.       5. 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   6. Status post PCI, completed 5-27-11.     7. Right thyroid nodule  8. Cancer related pain receiving hydrocodone 10/325  9. Metastatic non-small cell lung cancer:  · PET scan 7/11/2019 showing enlarging hypermetabolic left upper lobe/perihilar mass 2.8 cm (SUV 19) with additional 6 mm subpleural left upper lobe lesion (SUV 6.2), right medial 10th rib/costal vertebral activity (SUV 4.5), right iliac 3.5 cm lesion (SUV 11.8), left iliac activity (SUV 5.3).  Activity in distal rectum and collapsed colon of unclear significance.   · CT head (unable to obtain MRI) negative for metastatic disease on 7/24/2019  · Bronchoscopy/EBUS 7/22/2019 unable to reach perihilar mass.  Left upper lobe brushings/washings negative for malignancy.    · CT-guided biopsy left upper lobe mass 8/1/2019 negative for malignancy.    · CT-guided biopsy right iliac lesion on 8/14/19 with poorly differentiated carcinoma. PDL1 15%, insufficient material for molecular testing.  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  Will need to discuss possible referral to genetics clinic to help determine  if REEMA mutation is germline for benefit of other family members.  · Initiated palliative radiation to right iliac lesion 8/15/2019, completed on 8/29/2019  · Patient is not a candidate for chemotherapy due to comorbidities.  Discussed single agent palliative immunotherapy with Keytruda versus palliative/supportive care with hospice involvement.  Patient elected to pursue Keytruda, initiated treatment on 8/30/2019.  · Addition of Xgeva 9/28/2019 with plans to administer every 6 weeks to coincide with Keytruda schedule.    HISTORY OF PRESENT ILLNESS:  The patient is a 73 y.o. year old female who is here for follow-up with the above-mentioned history.    History of Present Illness the patient returns today in follow-up due for cycle 3 Keytruda.  She comes in today feeling fatigued.  She has not been drinking as well the past few days she reports.  Denies fevers, bleeding, or new pain.  She does continue intermittent hydrocodone.  She denies any worsening shortness of breath or edema.      PAST MEDICAL HISTORY:   1. Chronic obstructive pulmonary disease.    2. Right low thoracic dermatome shingles in 02/12.    3. Cystoscopy with bladder biopsy in 05/2012.    4. Psoriasis identified in the right heel in 2013.    5. Osteoarthritis.  6. Colonoscopy 8/4/15 with 5 mm benign rectal polyp.  7. Right sided thyroid nodule.  8. Atrial fibrillation on anticoagulation with Eliquis 5 mg twice a day.  Pacemaker placed 3/13/17.    SURGICAL HISTORY:    1. Left mastectomy in 1998, left breast reconstruction and right breast implant placement in 1999.    2. Exploratory laparotomy.   3. Status post pacemaker placement left chest wall 3/13/17.     ONCOLOGIC HISTORY:  (History from previous dates can be found in the separate document.)  Past Medical History:   Diagnosis Date   • Aortic valve insufficiency    • Atrial fibrillation and flutter (CMS/HCC)    • Axillary lump    • Breast cancer (CMS/HCC)     Left, stage I; ER/FL positive   •  Chronic kidney disease     Stage 3   • COPD (chronic obstructive pulmonary disease) (CMS/HCC)    • Dizziness     upon standing   • Fatigue    • Hypertension    • Lung cancer (CMS/HCC)    • Neutropenia (CMS/HCC)    • Osteoarthritis    • Paroxysmal atrial fibrillation (CMS/HCC)    • Peripheral neuropathy     Secondary to cisplatin.   • Pneumonia 06/2019   • Psoriasis     Identified in the right heel in 2013.   • Rectal polyp    • Shingles 02/2012    RIGHT LOW THORACIC    • Small cell lung cancer (CMS/HCC)     Limited stage   • Thyroid nodule     RIGHT   • Wheezing      Past Surgical History:   Procedure Laterality Date   • BREAST IMPLANT SURGERY Right 1999   • BREAST RECONSTRUCTION Left 1999   • BRONCHOSCOPY N/A 7/22/2019    Procedure: BRONCHOSCOPY WITH ENDOBRONCHIAL ULTRASOUND WITH BAL AND BRUSHINGS;  Surgeon: Johnathan Elliott MD;  Location: [facility] ENDOSCOPY;  Service: Pulmonary   • CARDIAC ELECTROPHYSIOLOGY PROCEDURE N/A 3/13/2017    Procedure: Device Implant  DDD pacer  medtronic;  Surgeon: Armani Kennedy MD;  Location: [facility] CATH INVASIVE LOCATION;  Service:    • CATARACT EXTRACTION Bilateral    • COLONOSCOPY     • CYSTOSCOPY BLADDER BIOPSY  05/2012   • EXPLORATORY LAPAROTOMY      Several years ago.   • MASTECTOMY Left 1998   • MEDIPORT INSERTION, SINGLE     • MEDIPORT REMOVAL Right    • PACEMAKER IMPLANTATION           Current Outpatient Medications on File Prior to Visit   Medication Sig Dispense Refill   • albuterol (PROVENTIL) (5 MG/ML) 0.5% nebulizer solution Take 2.5 mg by nebulization Every 6 (Six) Hours As Needed for Wheezing.     • apixaban (ELIQUIS) 5 MG tablet tablet Take 5 mg by mouth 2 (Two) Times a Day.     • bumetanide (BUMEX) 2 MG tablet Take 2 mg by mouth Daily.     • Fluticasone-Umeclidin-Vilant (TRELEGY ELLIPTA) 100-62.5-25 MCG/INH aerosol powder  Inhale 1 each Daily.     • HYDROcodone-acetaminophen (NORCO)  MG per tablet Take 1 tablet by mouth Every 6 (Six) Hours As Needed for  Moderate Pain . 90 tablet 0   • metoprolol tartrate (LOPRESSOR) 25 MG tablet Take 37.5 mg by mouth 2 (Two) Times a Day.     • omeprazole (priLOSEC) 40 MG capsule Take 40 mg by mouth Daily.     • ondansetron (ZOFRAN) 8 MG tablet Take 1 tablet by mouth 3 (Three) Times a Day As Needed for Nausea or Vomiting. 30 tablet 5   • polyethylene glycol (MIRALAX) packet Take 17 g by mouth 2 (Two) Times a Day.     • potassium chloride (K-DUR,KLOR-CON) 20 MEQ CR tablet Take 20 mEq by mouth Daily.     • Probiotic Product (PROBIOTIC-10 PO) Take  by mouth. florastor     • senna (SENOKOT) 8.6 MG tablet Take 1 tablet by mouth Daily.       No current facility-administered medications on file prior to visit.        ALLERGIES:     Allergies   Allergen Reactions   • Moxifloxacin Nausea Only     Passed out after taking       SOCIAL HISTORY:  .  Drinks approximately three beers every other day.  Long-standing smoking history. No HIV risk factors.  Social History     Socioeconomic History   • Marital status:      Spouse name: Dirk   • Number of children: Not on file   • Years of education: Not on file   • Highest education level: Not on file   Occupational History     Employer: RETIRED   Tobacco Use   • Smoking status: Former Smoker     Start date: 2011   • Smokeless tobacco: Never Used   • Tobacco comment: quit 6 years ago    //    caffeine use - one soft drink daily    Substance and Sexual Activity   • Alcohol use: No     Comment: Occasional beer, approximately 3 beers every other day.   • Drug use: No   • Sexual activity: Defer         FAMILY HISTORY:  Positive for melanoma in her mother, diagnosed at the age of 70 and  from heart attack at age of 80.  Father had colon cancer at age 70 and  at age of 80 of abdominal carcinomatosis.    Family History   Problem Relation Age of Onset   • Heart disease Mother    • Heart attack Mother    • Melanoma Mother 70   • Colon cancer Father 70   • Cancer Father          Abdominal carcinomatosis   • Stroke Neg Hx        Review of Systems   Constitutional: Positive for activity change, fatigue and unexpected weight change. Negative for appetite change.   HENT: Negative for mouth sores, nosebleeds and voice change.    Eyes: Negative for itching.   Respiratory: Positive for shortness of breath. Negative for wheezing.    Cardiovascular: Negative for palpitations and leg swelling.   Gastrointestinal: Negative for abdominal distention, blood in stool, constipation and diarrhea.   Endocrine: Negative for cold intolerance and heat intolerance.   Genitourinary: Negative for difficulty urinating, dysuria, frequency and hematuria.   Musculoskeletal: Positive for arthralgias and gait problem. Negative for back pain and neck pain.   Neurological: Positive for weakness. Negative for dizziness, syncope, light-headedness and numbness.   Hematological: Negative for adenopathy (). Does not bruise/bleed easily.   Psychiatric/Behavioral: Negative for confusion and sleep disturbance. The patient is not nervous/anxious.          Objective      Vitals:    10/11/19 1040   BP: 93/56   Pulse: 91   Resp: 16   Temp: 98.3 °F (36.8 °C)   SpO2: 94%      Current Status 10/11/2019   ECOG score 0       Physical Exam   Constitutional: She is oriented to person, place, and time. She appears well-developed and well-nourished.   Elderly female no distress seated in a wheelchair   HENT:   Mouth/Throat: Oropharynx is clear and moist.   Eyes: Conjunctivae are normal.   Neck: No thyromegaly present.   Cardiovascular: Normal rate and regular rhythm. Exam reveals no gallop and no friction rub.   No murmur heard.  Pulmonary/Chest: No respiratory distress. Right breast exhibits no mass. Left breast exhibits no mass.   Abdominal: Soft. Bowel sounds are normal. She exhibits no distension. There is no tenderness.   Musculoskeletal: She exhibits no edema.   Lymphadenopathy:     She has no cervical adenopathy.        Right: No  supraclavicular adenopathy present.        Left: No supraclavicular adenopathy present.   Neurological: She is alert and oriented to person, place, and time. She displays normal reflexes. No cranial nerve deficit. She exhibits normal muscle tone.   Skin: Skin is warm and dry. No rash noted.   Psychiatric: She has a normal mood and affect. Her behavior is normal.   The patient was examined today, unchanged from above.    RECENT LABS:  Hematology WBC   Date Value Ref Range Status   10/11/2019 11.91 (H) 3.40 - 10.80 10*3/mm3 Final     RBC   Date Value Ref Range Status   10/11/2019 4.18 3.77 - 5.28 10*6/mm3 Final     Hemoglobin   Date Value Ref Range Status   10/11/2019 12.1 12.0 - 15.9 g/dL Final     Hematocrit   Date Value Ref Range Status   10/11/2019 38.3 34.0 - 46.6 % Final     Platelets   Date Value Ref Range Status   10/11/2019 405 140 - 450 10*3/mm3 Final        Lab Results   Component Value Date    GLUCOSE 119 (H) 10/04/2019    BUN 19 10/04/2019    CREATININE 1.07 (H) 10/04/2019    EGFRIFNONA 50 (L) 10/04/2019    BCR 17.8 10/04/2019    CO2 24.9 10/04/2019    CALCIUM 9.0 10/04/2019    ALBUMIN 4.30 10/04/2019    AST 9 10/04/2019    ALT 8 10/04/2019           Assessment/Plan      1. Limited stage small cell lung cancer:   · The patient completed concurrent chemoradiation with 6 cycles of cisplatin and -16 in March 2011 and achieved a complete response.   · She did receive PCI completing this in May 2011.    · The patient showed no evidence of recurrent disease.  2. Metastatic non-small cell lung cancer:  · Annual low-dose screening CT scan of the chest.  Follow-up study from 6/6/18 showed new findings with small to moderate right pleural effusion as well as right pleural thickening and potential lymphadenopathy along the right lower lobe bronchovascular bundle although difficult to identify with certainty.    · Suspicion for potential underlying malignancy. PET scan 6/15/18 showed no significant change in the  right pleural effusion with low level activity in the right lower lobe bronchovascular bundle SUV of only 3.7.  There was therefore no definitive suspicious hypermetabolic activity to explain the pleural effusion.    · Diagnostic right thoracentesis on 6/20/18 with 250 cc clear yellow fluid removed with LDH 90, pH 7.5, total protein 1.8, appearing transudative with pathology showing no evidence of malignant cells. Etiology of the findings and pleural effusion unclear.  Patient referred back to cardiology and was started by PCP on Lasix 40 mg daily in July 2018.    · CT chest 7/13/18 with no change and negative/low probability V/Q scan 7/16/18.  · Repeat CT 8/17/18 showed near complete resolution of pleural effusions with only a minimal right pleural effusion remaining and some atelectasis/scarring in the right lower lobe.   · Follow-up CT chest 11/30/2018 with probable scar right lower lobe.  · Patient was hospitalized 5/22-5/27/2019 with suspected viral and bacterial pneumonia.  She did have evidence of metapneumovirus on respiratory viral panel.  CT chest 5/23/2019 showed increased consolidation right base consistent with infiltrate.  There was evidence of scattered groundglass opacity (likely the viral component).  There was an enlarging nodule however with irregular margins left upper lobe associated with left hilum worrisome for malignancy.   · PET scan 7/11/2019 showed enlarging hypermetabolic left upper lobe/perihilar mass 2.8 cm (SUV 19) with additional 6 mm subpleural left upper lobe lesion (SUV 6.2), right medial 10th rib/costal vertebral activity (SUV 4.5), right iliac 3.5 cm lesion (SUV 11.8), left iliac activity (SUV 5.3).  Activity in distal rectum and collapsed colon of unclear significance.   · CT head (unable to obtain MRI) negative for metastatic disease on 7/24/2019  · Bronchoscopy/EBUS 7/22/2019 unable to reach perihilar mass.  Left upper lobe brushings/washings negative for malignancy.     · CT-guided biopsy left upper lobe mass 8/1/2019- for malignancy.    · CT-guided biopsy right iliac lesion on 8/14/19 with poorly differentiated carcinoma. PDL1 15%, insufficient material for molecular testing.  · Clinically felt to represent metastatic non-small cell lung cancer  · [facility] liquid biopsy from 8/21/2019 showed REEMA mutation [id number] and TP53 mutation V216M.  Will need to discuss possible referral to genetics clinic to help determine if REEMA mutation is germline for benefit of other family members.  · Initiated palliative radiation to right iliac lesion 8/15/2019, completed on 8/29/2019  · Patient is not a candidate for chemotherapy due to comorbidities.  Discussed single agent palliative immunotherapy with Keytruda versus palliative/supportive care with hospice involvement.  Patient elected to pursue Keytruda, initiated treatment 8/30/2019.  · Added Xgeva 9/20/2019, plan to treat every 6 weeks to coincide with Keytruda schedule.  · The patient returns today in follow-up due for cycle 3 Keytruda.    ·  In 2 weeks she will undergo repeat PET scan and in 3 weeks Dr. Almanzar will see her back in follow-up when she will be due for cycle 4.  3. Cancer related pain:   · Continued significant pain due to metastatic lesion in the right iliac bone  · Completed palliative radiation right iliac lesion 8/29/2019  · Improvement in right iliac pain following radiation.  Currently, patient with pain involving shoulder.  We did pursue plain film of both areas today showing no significant bony abnormalities.  Patient is requiring currently or doses hydrocodone 10/325/day with we discussed her code own somewhat ahead of schedule to keep pain under improved control.  Discussed potential use of long-acting narcotics if the pain worsens would likely begin with low-dose 12 mcg Duragesic patch if needed.  For now, continue hydrocodone and refill provided, #90.  4. Narcotic induced constipation:  · Currently  controlled with coconut oil alone  5. History of stage I breast cancer on the left:   · The patient underwent a left mastectomy and completed 5 years of tamoxifen in July 2003.    · Exam on 6/8/2019 was negative.    · Annual right mammogram 9/5/18 was negative, BI-RADS 1.    · We are not pursuing further routine mammography due to patient's metastatic lung cancer.  6. 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 This does affect her balance; it is unchanged.   7. Stage III chronic kidney disease:   · Baseline creatinine in the 1.1-1.2 range.  · Creatinine today 1.16  8. Atrial fibrillation:   · Pacemaker placement 3/13/17 after having a syncopal episode.  · Patient continuing on Eliquis 5 mg twice daily.   9. Mild dementia:   · Short-term memory is very poor.  This has been an ongoing issue for her.  Her scans do show significant progression of small vessel ischemic change.  It is felt that some of this has been precipitated by her previous PCI.  10. Sigmoid colon uptake on PET scan:   · PET scan 6/15/2018 did show activity in the distal sigmoid colon/rectum and the patient was referred back to her gastroenterologist Dr. Colton Watkins.    · He did perform a flexible sigmoidoscopy which was negative on 8/2/18 and likely the activity represents muscular activity in the bowel wall.    · Subsequent PET scan 7/11/2019 showed continued activity in the distal rectum SUV 15.9.  There was also activity throughout the colon, possibly suggestive of inflammation/colitis.    · Patient relatively asymptomatic from GI standpoint.    · Patient was not felt to require any additional endoscopic evaluation at this point.  11. Recurrent epistaxis:  · Patient is continuing on Eliquis for atrial fibrillation, 5 mg twice daily  · Patient reports recent difficulty with recurrent epistaxis unclear whether unilateral or bilateral.  · We will refer the patient to  ENT to evaluate need for cauterization.  She has previously seen Dr. Dos Santos.  12. Bone metastatsis:  · Xgeva added with cycle2 Keytruda and we did discuss risk of osteonecrosis of the jaw.  We will plan to treat every 6 weeks to coincide with Keytruda dosing.     PLAN:     1. Proceed with cycle 3 Keytruda today.  2. Continue for home physical therapy  3. We will plan to discuss foundation medicine liquid biopsy results at next visit in regards to potential implications for germline REEMA mutation and possible need for evaluation in genetics clinic.  4. In 2 weeks PET scan  5. In 3 weeks MD visit with CBC, CMP, magnesium, phosphorus and cycle 4 Keytruda pending PET scan results.  Patient will also be due for Xgeva (every 6 weeks).

## 2019-10-21 NOTE — TELEPHONE ENCOUNTER
Script routed to     ----- Message from Anna Montes sent at 10/21/2019  2:56 PM EDT -----  Contact: [phone]  Pt needs refill on Hydrocodone .

## 2019-11-02 NOTE — PROGRESS NOTES
Subjective .     REASONS FOR FOLLOWUP:    1. History of stage I left breast cancer in 1998, ER/SC positive, status post mastectomy followed by tamoxifen x 5 years, completed in 07/2003.    2. Limited stage small cell lung cancer with right lower lobe mass, right hilar adenopathy.  Initiation of cisplatin/-16 chemotherapy, cycle 1 initiated 11/17/10.  Concurrent radiation therapy initiated 11/19/10.  Radiation therapy completed 01/11/11.   3. Status post Mediport placement on 12/27/10.  Mediport removed in November 2011.    4. Cycle #6 of cisplatin/-16 chemotherapy initiated 3/1/11.  Complete response seen on subsequent CT scan dated 3/28/11.       5. 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   6. Status post PCI, completed 5-27-11.     7. Right thyroid nodule  8. Cancer related pain receiving hydrocodone 10/325  9. Metastatic non-small cell lung cancer:  · PET scan 7/11/2019 showing enlarging hypermetabolic left upper lobe/perihilar mass 2.8 cm (SUV 19) with additional 6 mm subpleural left upper lobe lesion (SUV 6.2), right medial 10th rib/costal vertebral activity (SUV 4.5), right iliac 3.5 cm lesion (SUV 11.8), left iliac activity (SUV 5.3).  Activity in distal rectum and collapsed colon of unclear significance.   · CT head (unable to obtain MRI) negative for metastatic disease on 7/24/2019  · Bronchoscopy/EBUS 7/22/2019 unable to reach perihilar mass.  Left upper lobe brushings/washings negative for malignancy.    · CT-guided biopsy left upper lobe mass 8/1/2019 negative for malignancy.    · CT-guided biopsy right iliac lesion on 8/14/19 with poorly differentiated carcinoma. PDL1 15%, insufficient material for molecular testing.  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  Will need to discuss possible referral to genetics clinic to help determine  if REEMA mutation is germline for benefit of other family members.  · Initiated palliative radiation to right iliac lesion 8/15/2019, completed on 8/29/2019  · Patient is not a candidate for chemotherapy due to comorbidities.  Discussed single agent palliative immunotherapy with Keytruda versus palliative/supportive care with hospice involvement.  Patient elected to pursue Keytruda, initiated treatment on 8/30/2019.  · Addition of Xgeva 9/28/2019 with plans to administer every 6 weeks to coincide with Keytruda schedule.  · PET scan following 3 cycles Keytruda 10/28/2019 with slight increase left upper lobe mass, slight increase left upper lobe pleural lesion, too small left upper lobe nodules, improvement right iliac lesion (was radiated), new small area of activity right acetabulum (no corresponding CT lesion).  Given length of time between previous PET scan and initiation of Keytruda and possibility of some areas representing pseudo-progression along with lack of alternative therapies elected to continue on Keytruda.  10. Immunotherapy induced skin rash:  · Developed at end of cycle 3 Keytruda involving forearms and chest, mild, grade 1  · Topical hydrocortisone 2.5% twice daily    HISTORY OF PRESENT ILLNESS:  The patient is a 74 y.o. year old female who is here for follow-up with the above-mentioned history.    History of Present Illness patient returns today in follow-up with laboratory studies and PET scan to review continuing on Keytruda and monthly Xgeva, due for both treatments today.  PET scan is being reviewed after 3 cycles Keytruda, due for cycle 4 today.  The patient had been tolerating treatment very well.  Over the past few days however she has noted the onset of a mildly pruritic skin rash involving her upper extremities and to a lesser extent her upper chest.  The rash is faint.  She notes some improvement in fatigue.  He has lost weight, decreasing from 195 down to 186 pounds.  She reports a slightly  diminished appetite.  She notes overall improvement in her cough and dyspnea.  She remains in a wheelchair much of the time but has been more ambulatory recently, using a walker between rooms.  She was undergoing home physical therapy which she feels helped significantly.  She has no other complaints today.    PAST MEDICAL HISTORY:   1. Chronic obstructive pulmonary disease.    2. Right low thoracic dermatome shingles in 02/12.    3. Cystoscopy with bladder biopsy in 05/2012.    4. Psoriasis identified in the right heel in 2013.    5. Osteoarthritis.  6. Colonoscopy 8/4/15 with 5 mm benign rectal polyp.  7. Right sided thyroid nodule.  8. Atrial fibrillation on anticoagulation with Eliquis 5 mg twice a day.  Pacemaker placed 3/13/17.    SURGICAL HISTORY:    1. Left mastectomy in 1998, left breast reconstruction and right breast implant placement in 1999.    2. Exploratory laparotomy.   3. Status post pacemaker placement left chest wall 3/13/17.     ONCOLOGIC HISTORY:  (History from previous dates can be found in the separate document.)  Past Medical History:   Diagnosis Date   • Aortic valve insufficiency    • Atrial fibrillation and flutter (CMS/HCC)    • Axillary lump    • Breast cancer (CMS/HCC)     Left, stage I; ER/RI positive   • Chronic kidney disease     Stage 3   • COPD (chronic obstructive pulmonary disease) (CMS/HCC)    • Dizziness     upon standing   • Fatigue    • Hypertension    • Lung cancer (CMS/HCC)    • Neutropenia (CMS/HCC)    • Osteoarthritis    • Paroxysmal atrial fibrillation (CMS/HCC)    • Peripheral neuropathy     Secondary to cisplatin.   • Pneumonia 06/2019   • Psoriasis     Identified in the right heel in 2013.   • Rectal polyp    • Shingles 02/2012    RIGHT LOW THORACIC    • Small cell lung cancer (CMS/HCC)     Limited stage   • Thyroid nodule     RIGHT   • Wheezing      Past Surgical History:   Procedure Laterality Date   • BREAST IMPLANT SURGERY Right 1999   • BREAST RECONSTRUCTION Left  1999   • BRONCHOSCOPY N/A 7/22/2019    Procedure: BRONCHOSCOPY WITH ENDOBRONCHIAL ULTRASOUND WITH BAL AND BRUSHINGS;  Surgeon: Johnathan Elliott MD;  Location: [facility] ENDOSCOPY;  Service: Pulmonary   • CARDIAC ELECTROPHYSIOLOGY PROCEDURE N/A 3/13/2017    Procedure: Device Implant  DDD pacer  medtronic;  Surgeon: Armani Kennedy MD;  Location: [facility] CATH INVASIVE LOCATION;  Service:    • CATARACT EXTRACTION Bilateral    • COLONOSCOPY     • CYSTOSCOPY BLADDER BIOPSY  05/2012   • EXPLORATORY LAPAROTOMY      Several years ago.   • MASTECTOMY Left 1998   • MEDIPORT INSERTION, SINGLE     • MEDIPORT REMOVAL Right    • PACEMAKER IMPLANTATION           Current Outpatient Medications on File Prior to Visit   Medication Sig Dispense Refill   • albuterol (PROVENTIL) (5 MG/ML) 0.5% nebulizer solution Take 2.5 mg by nebulization Every 6 (Six) Hours As Needed for Wheezing.     • apixaban (ELIQUIS) 5 MG tablet tablet Take 5 mg by mouth 2 (Two) Times a Day.     • bumetanide (BUMEX) 2 MG tablet Take 2 mg by mouth Daily.     • Fluticasone-Umeclidin-Vilant (TRELEGY ELLIPTA) 100-62.5-25 MCG/INH aerosol powder  Inhale 1 each Daily.     • HYDROcodone-acetaminophen (NORCO)  MG per tablet Take 1 tablet by mouth Every 6 (Six) Hours As Needed for Moderate Pain . 90 tablet 0   • metoprolol tartrate (LOPRESSOR) 25 MG tablet Take 37.5 mg by mouth 2 (Two) Times a Day.     • omeprazole (priLOSEC) 40 MG capsule Take 40 mg by mouth Daily.     • ondansetron (ZOFRAN) 8 MG tablet Take 1 tablet by mouth 3 (Three) Times a Day As Needed for Nausea or Vomiting. 30 tablet 5   • polyethylene glycol (MIRALAX) packet Take 17 g by mouth 2 (Two) Times a Day.     • potassium chloride (K-DUR,KLOR-CON) 20 MEQ CR tablet Take 20 mEq by mouth Daily.     • senna (SENOKOT) 8.6 MG tablet Take 1 tablet by mouth Daily.     • [DISCONTINUED] Probiotic Product (PROBIOTIC-10 PO) Take  by mouth. florastor       Current Facility-Administered Medications on File  Prior to Visit   Medication Dose Route Frequency Provider Last Rate Last Dose   • [COMPLETED] denosumab (XGEVA) injection 120 mg  120 mg Subcutaneous Once Anitha Cyr APRN   120 mg at 19 1504   • [COMPLETED] Pembrolizumab (KEYTRUDA) 200 mg in sodium chloride 0.9 % 100 mL chemo IVPB  200 mg Intravenous Once Cornelio Almanzar Jr., MD   Stopped at 19 1546   • [COMPLETED] sodium chloride 0.9 % infusion 250 mL  250 mL Intravenous Once Cornelio Almanzar Jr., MD   Stopped at 19 1548       ALLERGIES:     Allergies   Allergen Reactions   • Moxifloxacin Nausea Only     Passed out after taking       SOCIAL HISTORY:  .  Drinks approximately three beers every other day.  Long-standing smoking history. No HIV risk factors.  Social History     Socioeconomic History   • Marital status:      Spouse name: Dirk   • Number of children: Not on file   • Years of education: Not on file   • Highest education level: Not on file   Occupational History     Employer: RETIRED   Tobacco Use   • Smoking status: Former Smoker     Start date: 2011   • Smokeless tobacco: Never Used   • Tobacco comment: quit 6 years ago    //    caffeine use - one soft drink daily    Substance and Sexual Activity   • Alcohol use: No     Comment: Occasional beer, approximately 3 beers every other day.   • Drug use: No   • Sexual activity: Defer         FAMILY HISTORY:  Positive for melanoma in her mother, diagnosed at the age of 70 and  from heart attack at age of 80.  Father had colon cancer at age 70 and  at age of 80 of abdominal carcinomatosis.    Family History   Problem Relation Age of Onset   • Heart disease Mother    • Heart attack Mother    • Melanoma Mother 70   • Colon cancer Father 70   • Cancer Father         Abdominal carcinomatosis   • Stroke Neg Hx        Review of Systems   Constitutional: Positive for activity change, fatigue and unexpected weight change. Negative for appetite change.   HENT:  Negative for mouth sores, nosebleeds and voice change.    Eyes: Negative for itching.   Respiratory: Positive for shortness of breath. Negative for wheezing.    Cardiovascular: Negative for palpitations and leg swelling.   Gastrointestinal: Negative for abdominal distention, blood in stool, constipation and diarrhea.   Endocrine: Negative for cold intolerance and heat intolerance.   Genitourinary: Negative for difficulty urinating, dysuria, frequency and hematuria.   Musculoskeletal: Positive for arthralgias. Negative for back pain, gait problem and neck pain.   Neurological: Negative for dizziness, syncope, weakness, light-headedness and numbness.   Hematological: Negative for adenopathy. Does not bruise/bleed easily.   Psychiatric/Behavioral: Negative for confusion and sleep disturbance. The patient is not nervous/anxious.          Objective      Vitals:    11/01/19 1312   BP: 98/66   Pulse: 89   Resp: 16   Temp: 97.5 °F (36.4 °C)   SpO2: 93%      Current Status 11/1/2019   ECOG score 1   Pain 0/10    Physical Exam   Constitutional: She is oriented to person, place, and time. She appears well-developed and well-nourished.   Elderly female no distress seated in a wheelchair   HENT:   Mouth/Throat: Oropharynx is clear and moist.   Eyes: Conjunctivae are normal.   Neck: No thyromegaly present.   Cardiovascular: Normal rate and regular rhythm. Exam reveals no gallop and no friction rub.   No murmur heard.  Pulmonary/Chest: No respiratory distress. She has wheezes. Right breast exhibits no mass. Left breast exhibits no mass.   A few faint wheezes, improved from previous exam   Abdominal: Soft. Bowel sounds are normal. She exhibits no distension. There is no tenderness.   Musculoskeletal: She exhibits no edema.   Lymphadenopathy:        Head (right side): No submandibular adenopathy present.     She has no cervical adenopathy.     She has no axillary adenopathy.        Right: No inguinal and no supraclavicular  adenopathy present.        Left: No inguinal and no supraclavicular adenopathy present.   Neurological: She is alert and oriented to person, place, and time. She displays normal reflexes. No cranial nerve deficit. She exhibits normal muscle tone.   Skin: Skin is warm and dry. Rash noted.   New skin rash bilateral upper extremities and to a lesser extent upper chest with small erythematous macular areas 0.5 cm or less   Psychiatric: She has a normal mood and affect. Her behavior is normal.       RECENT LABS:  Hematology WBC   Date Value Ref Range Status   11/01/2019 9.20 3.40 - 10.80 10*3/mm3 Final     RBC   Date Value Ref Range Status   11/01/2019 3.59 (L) 3.77 - 5.28 10*6/mm3 Final     Hemoglobin   Date Value Ref Range Status   11/01/2019 10.1 (L) 12.0 - 15.9 g/dL Final     Hematocrit   Date Value Ref Range Status   11/01/2019 33.4 (L) 34.0 - 46.6 % Final     Platelets   Date Value Ref Range Status   11/01/2019 383 140 - 450 10*3/mm3 Final        Lab Results   Component Value Date    GLUCOSE 117 11/01/2019    BUN 21 (H) 11/01/2019    CREATININE 1.19 (H) 11/01/2019    EGFRIFNONA 44 (L) 11/01/2019    BCR 17.6 11/01/2019    CO2 28.1 11/01/2019    CALCIUM 9.5 11/01/2019    ALBUMIN 4.20 11/01/2019    AST 12 11/01/2019    ALT 14 11/01/2019     PET scan 10/28/2019: I did personally review PET scan images today.  Results as summarized above and below.      Assessment/Plan      1. Limited stage small cell lung cancer:   · The patient completed concurrent chemoradiation with 6 cycles of cisplatin and -16 in March 2011 and achieved a complete response.   · She did receive PCI completing this in May 2011.    · The patient showed no evidence of recurrent disease.  2. Metastatic non-small cell lung cancer:  · Annual low-dose screening CT scan of the chest.  Follow-up study from 6/6/18 showed new findings with small to moderate right pleural effusion as well as right pleural thickening and potential lymphadenopathy along the  right lower lobe bronchovascular bundle although difficult to identify with certainty.    · Suspicion for potential underlying malignancy. PET scan 6/15/18 showed no significant change in the right pleural effusion with low level activity in the right lower lobe bronchovascular bundle SUV of only 3.7.  There was therefore no definitive suspicious hypermetabolic activity to explain the pleural effusion.    · Diagnostic right thoracentesis on 6/20/18 with 250 cc clear yellow fluid removed with LDH 90, pH 7.5, total protein 1.8, appearing transudative with pathology showing no evidence of malignant cells. Etiology of the findings and pleural effusion unclear.  Patient referred back to cardiology and was started by PCP on Lasix 40 mg daily in July 2018.    · CT chest 7/13/18 with no change and negative/low probability V/Q scan 7/16/18.  · Repeat CT 8/17/18 showed near complete resolution of pleural effusions with only a minimal right pleural effusion remaining and some atelectasis/scarring in the right lower lobe.   · Follow-up CT chest 11/30/2018 with probable scar right lower lobe.  · Patient was hospitalized 5/22-5/27/2019 with suspected viral and bacterial pneumonia.  She did have evidence of metapneumovirus on respiratory viral panel.  CT chest 5/23/2019 showed increased consolidation right base consistent with infiltrate.  There was evidence of scattered groundglass opacity (likely the viral component).  There was an enlarging nodule however with irregular margins left upper lobe associated with left hilum worrisome for malignancy.   · PET scan 7/11/2019 showed enlarging hypermetabolic left upper lobe/perihilar mass 2.8 cm (SUV 19) with additional 6 mm subpleural left upper lobe lesion (SUV 6.2), right medial 10th rib/costal vertebral activity (SUV 4.5), right iliac 3.5 cm lesion (SUV 11.8), left iliac activity (SUV 5.3).  Activity in distal rectum and collapsed colon of unclear significance.   · CT head (unable  to obtain MRI) negative for metastatic disease on 7/24/2019  · Bronchoscopy/EBUS 7/22/2019 unable to reach perihilar mass.  Left upper lobe brushings/washings negative for malignancy.    · CT-guided biopsy left upper lobe mass 8/1/2019- for malignancy.    · CT-guided biopsy right iliac lesion on 8/14/19 with poorly differentiated carcinoma. PDL1 15%, insufficient material for molecular testing.  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  Will need to discuss possible referral to genetics clinic to help determine if REEMA mutation is germline for benefit of other family members.  · Initiated palliative radiation to right iliac lesion 8/15/2019, completed on 8/29/2019  · Patient is not a candidate for chemotherapy due to comorbidities.  Discussed single agent palliative immunotherapy with Keytruda versus palliative/supportive care with hospice involvement.  Patient elected to pursue Keytruda, initiated treatment 8/30/2019.  · Added Xgeva 9/20/2019, plan to treat every 6 weeks to coincide with Keytruda schedule.  · PET scan following 3 cycles Keytruda 10/28/2019 with slight increase left upper lobe mass, slight increase left upper lobe pleural lesion, too small left upper lobe nodules, improvement right iliac lesion (was radiated), new small area of activity right acetabulum (no corresponding CT lesion).  Given length of time between previous PET scan and initiation of Keytruda and possibility of some areas representing pseudo-progression along with lack of alternative therapies elected to continue on Keytruda.  · As above, patient returns today to review the PET scan from 10/28/2019 following 3 cycles Keytruda and is due to begin cycle 4 Keytruda today along with continue monthly Xgeva.  The patient has developed immunotherapy related skin rash just over the past few days.  This is mild,  grade 1 with minimal pruritus.  We will attempt to treat topically with hydrocortisone.  Patient was instructed to contact us if her rash worsens.  In reviewing her PET scan as above, there is some evidence of growth in the 2 left lung lesions and question of 2 small new lesions left upper lobe as well as new activity in the right acetabulum however no corresponding CT correlate.  We discussed the labs and time between reviewed his PET scan and July 2019 and initiation of Keytruda on 8/30/2019 and whether some progression could have occurred in this timeframe.  The right iliac lesion has improved and this was radiated.  Her pain has improved as well.  Clinically she is doing quite well with the exception of her weight loss.  As she appears to have gained benefit clinically and there is some question whether progression indeed occurred on treatment the patient would like to continue Keytruda.  This is reasonable as she is not a candidate for chemotherapy and we would be pursuing palliative care alone if we stopped Keytruda at this point.  She is experiencing slight toxicity from treatment with her skin rash however it appears manageable at this point.  I will see the patient back in 3 weeks when she is due again for Keytruda cycle 5.  We discussed obtaining repeat PET scan at the end of cycle 6  3. Cancer related pain:   · Previous significant pain due to metastatic lesion in the right iliac bone  · Completed palliative radiation right iliac lesion 8/29/2019  · Improvement in right iliac pain following radiation.  · Patient is not requiring significant pain medication but does have hydrocodone 10/325 to use as needed  4. Narcotic induced constipation:  · Currently controlled with coconut oil alone  5. History of stage I breast cancer on the left:   · The patient underwent a left mastectomy and completed 5 years of tamoxifen in July 2003.    · Exam on 6/8/2019 was negative.    · Annual right mammogram 9/5/18 was  negative, BI-RADS 1.    · We are not pursuing further routine mammography due to patient's metastatic lung cancer.  6. 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 This does affect her balance; it is unchanged.   7. Stage III chronic kidney disease:   · Baseline creatinine in the 1.1-1.2 range.  · Creatinine today 1.19  8. Atrial fibrillation:   · Pacemaker placement 3/13/17 after having a syncopal episode.  · Patient continuing on Eliquis 5 mg twice daily.   9. Mild dementia:   · Short-term memory is very poor.  This has been an ongoing issue for her.  Her scans do show significant progression of small vessel ischemic change.  It is felt that some of this has been precipitated by her previous PCI.  10. Sigmoid colon uptake on PET scan:   · PET scan 6/15/2018 did show activity in the distal sigmoid colon/rectum and the patient was referred back to her gastroenterologist Dr. Colton Watkins.    · He did perform a flexible sigmoidoscopy which was negative on 8/2/18 and likely the activity represents muscular activity in the bowel wall.    · Subsequent PET scan 7/11/2019 showed continued activity in the distal rectum SUV 15.9.  There was also activity throughout the colon, possibly suggestive of inflammation/colitis.    · Patient relatively asymptomatic from GI standpoint.    · Patient was not felt to require any additional endoscopic evaluation at this point.  · PET scan 10/28/2019 with unchanged activity in the distal rectum.  11. Recurrent epistaxis:  · Patient is continuing on Eliquis for atrial fibrillation, 5 mg twice daily  · Patient developed recurrent epistaxis unclear whether unilateral or bilateral.  · Patient referred to Dr. Dos Santos in ENT, refused cauterization  · Symptoms currently improved  12. Immunotherapy induced skin rash:  · Mild grade 1 immunotherapy induced skin rash developed with macular erythematous lesions forearms and  upper chest at end of cycle 3 Keytruda  · Prescribed topical hydrocortisone cream 2.5% twice daily on 11/1/2019.  Patient instructed to call with any worsening symptoms    PLAN:     1. Proceed with cycle 4 Keytruda today.  2. Continue with monthly Xgeva today  3. We will defer discussion regarding genetics evaluation for REEMA mutation until next visit.  Patient will be traveling to [place] over the next few weeks.  4. Prescription sent for hydrocortisone 2.5% topical cream twice daily for immunotherapy induced skin rash, grade 1 involving arms and chest.  5. MD visit in 3 weeks with CBC, CMP, TSH, free T4 and cycle 5 Keytruda.  Plan repeat PET scan at the end of cycle 6.    I did spend 45 minutes with the patient today, 35 minutes of face-to-face consultation and coordination of care reviewing issues outlined in the assessment and plan above.    The patient continues on high risk medication requiring intensive monitoring.

## 2019-11-22 NOTE — PROGRESS NOTES
Subjective .     REASONS FOR FOLLOWUP:    1. History of stage I left breast cancer in 1998, ER/MA positive, status post mastectomy followed by tamoxifen x 5 years, completed in 07/2003.    2. Limited stage small cell lung cancer with right lower lobe mass, right hilar adenopathy.  Initiation of cisplatin/-16 chemotherapy, cycle 1 initiated 11/17/10.  Concurrent radiation therapy initiated 11/19/10.  Radiation therapy completed 01/11/11.   3. Status post Mediport placement on 12/27/10.  Mediport removed in November 2011.    4. Cycle #6 of cisplatin/-16 chemotherapy initiated 3/1/11.  Complete response seen on subsequent CT scan dated 3/28/11.       5. 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   6. Status post PCI, completed 5-27-11.     7. Right thyroid nodule  8. Cancer related pain receiving hydrocodone 10/325  9. Metastatic non-small cell lung cancer:  · PET scan 7/11/2019 showing enlarging hypermetabolic left upper lobe/perihilar mass 2.8 cm (SUV 19) with additional 6 mm subpleural left upper lobe lesion (SUV 6.2), right medial 10th rib/costal vertebral activity (SUV 4.5), right iliac 3.5 cm lesion (SUV 11.8), left iliac activity (SUV 5.3).  Activity in distal rectum and collapsed colon of unclear significance.   · CT head (unable to obtain MRI) negative for metastatic disease on 7/24/2019  · Bronchoscopy/EBUS 7/22/2019 unable to reach perihilar mass.  Left upper lobe brushings/washings negative for malignancy.    · CT-guided biopsy left upper lobe mass 8/1/2019 negative for malignancy.    · CT-guided biopsy right iliac lesion on 8/14/19 with poorly differentiated carcinoma. PDL1 15%, insufficient material for molecular testing.  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  Will need to discuss possible referral to genetics clinic to help determine  if REEMA mutation is germline for benefit of other family members.  · Initiated palliative radiation to right iliac lesion 8/15/2019, completed on 8/29/2019  · Patient is not a candidate for chemotherapy due to comorbidities.  Discussed single agent palliative immunotherapy with Keytruda versus palliative/supportive care with hospice involvement.  Patient elected to pursue Keytruda, initiated treatment on 8/30/2019.  · Addition of Xgeva 9/28/2019 with plans to administer every 6 weeks to coincide with Keytruda schedule.  · PET scan following 3 cycles Keytruda 10/28/2019 with slight increase left upper lobe mass, slight increase left upper lobe pleural lesion, too small left upper lobe nodules, improvement right iliac lesion (was radiated), new small area of activity right acetabulum (no corresponding CT lesion).  Given length of time between previous PET scan and initiation of Keytruda and possibility of some areas representing pseudo-progression along with lack of alternative therapies elected to continue on Keytruda.  10. Immunotherapy induced skin rash:  · Developed at end of cycle 3 Keytruda involving forearms and chest, mild, grade 1  · Topical hydrocortisone 2.5% twice daily    HISTORY OF PRESENT ILLNESS:  The patient is a 74 y.o. year old female who is here for follow-up with the above-mentioned history.    History of Present Illness patient returns today in follow-up with laboratory studies to review continuing on Keytruda and monthly Xgeva, due for both treatments today.  Today she is due for cycle 5 Keytruda.  The patient was able to spend the past 3 weeks in [place].  She feels that this helped her quite a bit.  She has gained weight increasing from 186 up to 190 pounds.  She notes that her skin rash is stable to slightly improved, involving primarily her forearms she has been using hydrocortisone 2.5% cream twice daily.  She has been taking hydrocodone 10/325 intermittently however reports that this dose  leads to increased sedation and is requesting that we lower the dose to 5/325.  Constipation is well controlled on Senokot twice daily.  She has been more active, using her walker more often.  She remains in a wheelchair today.  She was in to see pulmonary yesterday and apparently was experiencing some wheezing.  She was placed on a 7-day course of prednisone 20 mg/day as well as a Brovana nebulizer.    PAST MEDICAL HISTORY:   1. Chronic obstructive pulmonary disease.    2. Right low thoracic dermatome shingles in 02/12.    3. Cystoscopy with bladder biopsy in 05/2012.    4. Psoriasis identified in the right heel in 2013.    5. Osteoarthritis.  6. Colonoscopy 8/4/15 with 5 mm benign rectal polyp.  7. Right sided thyroid nodule.  8. Atrial fibrillation on anticoagulation with Eliquis 5 mg twice a day.  Pacemaker placed 3/13/17.    SURGICAL HISTORY:    1. Left mastectomy in 1998, left breast reconstruction and right breast implant placement in 1999.    2. Exploratory laparotomy.   3. Status post pacemaker placement left chest wall 3/13/17.     ONCOLOGIC HISTORY:  (History from previous dates can be found in the separate document.)  Past Medical History:   Diagnosis Date   • Aortic valve insufficiency    • Atrial fibrillation and flutter (CMS/HCC)    • Axillary lump    • Breast cancer (CMS/HCC)     Left, stage I; ER/MA positive   • Chronic kidney disease     Stage 3   • COPD (chronic obstructive pulmonary disease) (CMS/HCC)    • Dizziness     upon standing   • Fatigue    • Hypertension    • Lung cancer (CMS/HCC)    • Neutropenia (CMS/HCC)    • Osteoarthritis    • Paroxysmal atrial fibrillation (CMS/HCC)    • Peripheral neuropathy     Secondary to cisplatin.   • Pneumonia 06/2019   • Psoriasis     Identified in the right heel in 2013.   • Rectal polyp    • Shingles 02/2012    RIGHT LOW THORACIC    • Small cell lung cancer (CMS/HCC)     Limited stage   • Thyroid nodule     RIGHT   • Wheezing      Past Surgical History:    Procedure Laterality Date   • BREAST IMPLANT SURGERY Right 1999   • BREAST RECONSTRUCTION Left 1999   • BRONCHOSCOPY N/A 7/22/2019    Procedure: BRONCHOSCOPY WITH ENDOBRONCHIAL ULTRASOUND WITH BAL AND BRUSHINGS;  Surgeon: Johnathan Elliott MD;  Location: [facility] ENDOSCOPY;  Service: Pulmonary   • CARDIAC ELECTROPHYSIOLOGY PROCEDURE N/A 3/13/2017    Procedure: Device Implant  DDD pacer  medtronic;  Surgeon: Armani Kennedy MD;  Location: [facility] CATH INVASIVE LOCATION;  Service:    • CATARACT EXTRACTION Bilateral    • COLONOSCOPY     • CYSTOSCOPY BLADDER BIOPSY  05/2012   • EXPLORATORY LAPAROTOMY      Several years ago.   • MASTECTOMY Left 1998   • MEDIPORT INSERTION, SINGLE     • MEDIPORT REMOVAL Right    • PACEMAKER IMPLANTATION           Current Outpatient Medications on File Prior to Visit   Medication Sig Dispense Refill   • albuterol (PROVENTIL) (5 MG/ML) 0.5% nebulizer solution Take 2.5 mg by nebulization Every 6 (Six) Hours As Needed for Wheezing.     • bumetanide (BUMEX) 2 MG tablet Take 2 mg by mouth Daily.     • hydrocortisone 2.5 % cream Apply  topically to the appropriate area as directed 2 (Two) Times a Day. 20 g 2   • metoprolol tartrate (LOPRESSOR) 25 MG tablet Take 37.5 mg by mouth 2 (Two) Times a Day.     • omeprazole (priLOSEC) 40 MG capsule Take 40 mg by mouth Daily.     • ondansetron (ZOFRAN) 8 MG tablet Take 1 tablet by mouth 3 (Three) Times a Day As Needed for Nausea or Vomiting. 30 tablet 5   • polyethylene glycol (MIRALAX) packet Take 17 g by mouth 2 (Two) Times a Day.     • potassium chloride (K-DUR,KLOR-CON) 20 MEQ CR tablet Take 20 mEq by mouth Daily.     • predniSONE (DELTASONE) 20 MG tablet      • senna (SENOKOT) 8.6 MG tablet Take 1 tablet by mouth Daily.     • [DISCONTINUED] apixaban (ELIQUIS) 5 MG tablet tablet Take 5 mg by mouth 2 (Two) Times a Day.     • [DISCONTINUED] HYDROcodone-acetaminophen (NORCO)  MG per tablet Take 1 tablet by mouth Every 6 (Six) Hours As Needed  for Moderate Pain . 90 tablet 0   • ELIQUIS 5 MG tablet tablet TAKE 1 TABLET BY MOUTH TWICE DAILY 180 tablet 2   • Fluticasone-Umeclidin-Vilant (TRELEGY ELLIPTA) 100-62.5-25 MCG/INH aerosol powder  Inhale 1 each Daily.       No current facility-administered medications on file prior to visit.        ALLERGIES:     Allergies   Allergen Reactions   • Moxifloxacin Nausea Only     Passed out after taking       SOCIAL HISTORY:  .  Drinks approximately three beers every other day.  Long-standing smoking history. No HIV risk factors.  Social History     Socioeconomic History   • Marital status:      Spouse name: Dirk   • Number of children: Not on file   • Years of education: Not on file   • Highest education level: Not on file   Occupational History     Employer: RETIRED   Tobacco Use   • Smoking status: Former Smoker     Start date: 2011   • Smokeless tobacco: Never Used   • Tobacco comment: quit 6 years ago    //    caffeine use - one soft drink daily    Substance and Sexual Activity   • Alcohol use: No     Comment: Occasional beer, approximately 3 beers every other day.   • Drug use: No   • Sexual activity: Defer         FAMILY HISTORY:  Positive for melanoma in her mother, diagnosed at the age of 70 and  from heart attack at age of 80.  Father had colon cancer at age 70 and  at age of 80 of abdominal carcinomatosis.    Family History   Problem Relation Age of Onset   • Heart disease Mother    • Heart attack Mother    • Melanoma Mother 70   • Colon cancer Father 70   • Cancer Father         Abdominal carcinomatosis   • Stroke Neg Hx        Review of Systems   Constitutional: Positive for activity change and fatigue. Negative for appetite change and unexpected weight change.   HENT: Negative for mouth sores, nosebleeds and voice change.    Eyes: Negative for itching.   Respiratory: Positive for cough and shortness of breath. Negative for wheezing.    Cardiovascular: Negative for  palpitations and leg swelling.   Gastrointestinal: Negative for abdominal distention, blood in stool, constipation and diarrhea.   Endocrine: Negative for cold intolerance and heat intolerance.   Genitourinary: Negative for difficulty urinating, dysuria, frequency and hematuria.   Musculoskeletal: Positive for arthralgias. Negative for back pain, gait problem and neck pain.   Skin: Positive for rash.   Neurological: Negative for dizziness, syncope, weakness, light-headedness and numbness.   Hematological: Negative for adenopathy. Does not bruise/bleed easily.   Psychiatric/Behavioral: Negative for confusion and sleep disturbance. The patient is not nervous/anxious.          Objective      Vitals:    11/22/19 0940   BP: 111/74   Pulse: 77   Resp: 16   Temp: 98.1 °F (36.7 °C)   SpO2: 96%      Current Status 11/22/2019   ECOG score 2   Pain 0/10    Physical Exam   Constitutional: She is oriented to person, place, and time. She appears well-developed and well-nourished.   Elderly female no distress seated in a wheelchair   HENT:   Mouth/Throat: Oropharynx is clear and moist.   Eyes: Conjunctivae are normal.   Neck: No thyromegaly present.   Cardiovascular: Normal rate and regular rhythm. Exam reveals no gallop and no friction rub.   No murmur heard.  Pulmonary/Chest: No respiratory distress. She has wheezes. Right breast exhibits no mass. Left breast exhibits no mass.   A few faint scattered wheezes   Abdominal: Soft. Bowel sounds are normal. She exhibits no distension. There is no tenderness.   Musculoskeletal: She exhibits no edema.   Lymphadenopathy:        Head (right side): No submandibular adenopathy present.     She has no cervical adenopathy.     She has no axillary adenopathy.        Right: No inguinal and no supraclavicular adenopathy present.        Left: No inguinal and no supraclavicular adenopathy present.   Neurological: She is alert and oriented to person, place, and time. She displays normal reflexes.  No cranial nerve deficit. She exhibits normal muscle tone.   Skin: Skin is warm and dry. Rash noted.   Faint skin rash involving bilateral upper extremities and to a lesser extent upper chest with small erythematous macular areas 0.5 cm or less.  Similar to previous exam.   Psychiatric: She has a normal mood and affect. Her behavior is normal.       RECENT LABS:  Hematology WBC   Date Value Ref Range Status   11/22/2019 8.93 3.40 - 10.80 10*3/mm3 Final     RBC   Date Value Ref Range Status   11/22/2019 3.39 (L) 3.77 - 5.28 10*6/mm3 Final     Hemoglobin   Date Value Ref Range Status   11/22/2019 9.5 (L) 12.0 - 15.9 g/dL Final     Hematocrit   Date Value Ref Range Status   11/22/2019 31.5 (L) 34.0 - 46.6 % Final     Platelets   Date Value Ref Range Status   11/22/2019 397 140 - 450 10*3/mm3 Final        Lab Results   Component Value Date    GLUCOSE 132 (H) 11/22/2019    BUN 20 11/22/2019    CREATININE 1.09 11/22/2019    EGFRIFNONA 49 (L) 11/22/2019    BCR 18.3 11/22/2019    CO2 25.7 11/22/2019    CALCIUM 10.1 11/22/2019    ALBUMIN 4.20 11/22/2019    AST 10 11/22/2019    ALT 6 11/22/2019           Assessment/Plan      1. Limited stage small cell lung cancer:   · The patient completed concurrent chemoradiation with 6 cycles of cisplatin and -16 in March 2011 and achieved a complete response.   · She did receive PCI completing this in May 2011.    · The patient showed no evidence of recurrent disease.  2. Metastatic non-small cell lung cancer:  · Annual low-dose screening CT scan of the chest.  Follow-up study from 6/6/18 showed new findings with small to moderate right pleural effusion as well as right pleural thickening and potential lymphadenopathy along the right lower lobe bronchovascular bundle although difficult to identify with certainty.    · Suspicion for potential underlying malignancy. PET scan 6/15/18 showed no significant change in the right pleural effusion with low level activity in the right lower lobe  bronchovascular bundle SUV of only 3.7.  There was therefore no definitive suspicious hypermetabolic activity to explain the pleural effusion.    · Diagnostic right thoracentesis on 6/20/18 with 250 cc clear yellow fluid removed with LDH 90, pH 7.5, total protein 1.8, appearing transudative with pathology showing no evidence of malignant cells. Etiology of the findings and pleural effusion unclear.  Patient referred back to cardiology and was started by PCP on Lasix 40 mg daily in July 2018.    · CT chest 7/13/18 with no change and negative/low probability V/Q scan 7/16/18.  · Repeat CT 8/17/18 showed near complete resolution of pleural effusions with only a minimal right pleural effusion remaining and some atelectasis/scarring in the right lower lobe.   · Follow-up CT chest 11/30/2018 with probable scar right lower lobe.  · Patient was hospitalized 5/22-5/27/2019 with suspected viral and bacterial pneumonia.  She did have evidence of metapneumovirus on respiratory viral panel.  CT chest 5/23/2019 showed increased consolidation right base consistent with infiltrate.  There was evidence of scattered groundglass opacity (likely the viral component).  There was an enlarging nodule however with irregular margins left upper lobe associated with left hilum worrisome for malignancy.   · PET scan 7/11/2019 showed enlarging hypermetabolic left upper lobe/perihilar mass 2.8 cm (SUV 19) with additional 6 mm subpleural left upper lobe lesion (SUV 6.2), right medial 10th rib/costal vertebral activity (SUV 4.5), right iliac 3.5 cm lesion (SUV 11.8), left iliac activity (SUV 5.3).  Activity in distal rectum and collapsed colon of unclear significance.   · CT head (unable to obtain MRI) negative for metastatic disease on 7/24/2019  · Bronchoscopy/EBUS 7/22/2019 unable to reach perihilar mass.  Left upper lobe brushings/washings negative for malignancy.    · CT-guided biopsy left upper lobe mass 8/1/2019- for malignancy.     · CT-guided biopsy right iliac lesion on 8/14/19 with poorly differentiated carcinoma. PDL1 15%, insufficient material for molecular testing.  · Clinically felt to represent metastatic non-small cell lung cancer  · [facility] liquid biopsy from 8/21/2019 showed REEMA mutation [id number] and TP53 mutation V216M.  Will need to discuss possible referral to genetics clinic to help determine if REEMA mutation is germline for benefit of other family members.  · Initiated palliative radiation to right iliac lesion 8/15/2019, completed on 8/29/2019  · Patient is not a candidate for chemotherapy due to comorbidities.  Discussed single agent palliative immunotherapy with Keytruda versus palliative/supportive care with hospice involvement.  Patient elected to pursue Keytruda, initiated treatment 8/30/2019.  · Added Xgeva 9/20/2019, plan to treat every 6 weeks to coincide with Keytruda schedule.  · PET scan following 3 cycles Keytruda 10/28/2019 with slight increase left upper lobe mass, slight increase left upper lobe pleural lesion, too small left upper lobe nodules, improvement right iliac lesion (was radiated), new small area of activity right acetabulum (no corresponding CT lesion).  Given length of time between previous PET scan and initiation of Keytruda and possibility of some areas representing pseudo-progression along with lack of alternative therapies elected to continue on Keytruda.  · The patient returns today due for cycle 5 Keytruda.  She has improved considerably over the past 3 weeks having spent time in [place].  Her appetite is improved, weight is improved.  Her skin rash is stable on topical hydrocortisone 2.5% twice daily.  She has been ambulating more with her walker.  Overall she has experienced medical improvement.  We will therefore proceed with treatment today.  In 3 weeks she will be due for Xgeva as we are administering this on a 6-week cycle.  This will be along with cycle 6 Keytruda.  In 5 weeks  she will undergo PET scan and I will see her again in 6 weeks when she is due for cycle 7.  TSH and free T4 were normal today, recheck in 6 weeks per protocol.  3. Cancer related pain:   · Previous significant pain due to metastatic lesion in the right iliac bone  · Completed palliative radiation right iliac lesion 8/29/2019  · Improvement in right iliac pain following radiation.  · Patient requires only intermittent dosing of hydrocodone however feels that the 10/325 is sedating and has requested that we provide a 5/325 dose.  New prescription was sent today, number 90.  4. Narcotic induced constipation:  · Currently controlled with coconut oil alone  5. History of stage I breast cancer on the left:   · The patient underwent a left mastectomy and completed 5 years of tamoxifen in July 2003.    · Exam on 6/8/2019 was negative.    · Annual right mammogram 9/5/18 was negative, BI-RADS 1.    · We are not pursuing further routine mammography due to patient's metastatic lung cancer.  6. 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 This does affect her balance; it is unchanged.   7. Stage III chronic kidney disease:   · Baseline creatinine in the 1.1-1.2 range.  · Creatinine today 1.09  8. Atrial fibrillation:   · Pacemaker placement 3/13/17 after having a syncopal episode.  · Patient continuing on Eliquis 5 mg twice daily.   9. Mild dementia:   · Short-term memory is very poor.  This has been an ongoing issue for her.  Her scans do show significant progression of small vessel ischemic change.  It is felt that some of this has been precipitated by her previous PCI.  10. Sigmoid colon uptake on PET scan:   · PET scan 6/15/2018 did show activity in the distal sigmoid colon/rectum and the patient was referred back to her gastroenterologist Dr. Colton Watkins.    · He did perform a flexible sigmoidoscopy which was negative on 8/2/18 and likely the  activity represents muscular activity in the bowel wall.    · Subsequent PET scan 7/11/2019 showed continued activity in the distal rectum SUV 15.9.  There was also activity throughout the colon, possibly suggestive of inflammation/colitis.    · Patient relatively asymptomatic from GI standpoint.    · Patient was not felt to require any additional endoscopic evaluation at this point.  · PET scan 10/28/2019 with unchanged activity in the distal rectum.  11. Recurrent epistaxis:  · Patient is continuing on Eliquis for atrial fibrillation, 5 mg twice daily  · Patient developed recurrent epistaxis unclear whether unilateral or bilateral.  · Patient referred to Dr. Dos Santos in ENT, refused cauterization  · Symptoms currently resolved  12. Immunotherapy induced skin rash:  · Mild grade 1 immunotherapy induced skin rash developed with macular erythematous lesions forearms and upper chest at end of cycle 3 Keytruda  · Prescribed topical hydrocortisone cream 2.5% twice daily on 11/1/2019.    · Skin rash stable today.  Patient instructed to call with any worsening symptoms    PLAN:     1. Proceed with cycle 5 Keytruda today.  2. We will defer discussion regarding genetics evaluation for REEMA mutation until next visit.   3. Prescription sent for hydrocodone 5/325, #90.  4. Continue hydrocortisone 2.5% cream twice daily to affected areas of immunotherapy induced skin rash.  5. Nurse practitioner visit in 3 weeks with CBC, CMP, magnesium, phosphorus and cycle 6 Keytruda along with Xgeva (every 6-week dosing).  6. PET scan in 5 weeks  7. MD visit in 6 weeks with CBC, CMP, TSH, free T4 and cycle 7 Keytruda pending scan results.    The patient continues on high risk medication requiring intensive monitoring.

## 2019-12-12 NOTE — PROGRESS NOTES
Subjective .     REASONS FOR FOLLOWUP:    1. History of stage I left breast cancer in 1998, ER/GA positive, status post mastectomy followed by tamoxifen x 5 years, completed in 07/2003.    2. Limited stage small cell lung cancer with right lower lobe mass, right hilar adenopathy.  Initiation of cisplatin/-16 chemotherapy, cycle 1 initiated 11/17/10.  Concurrent radiation therapy initiated 11/19/10.  Radiation therapy completed 01/11/11.   3. Status post Mediport placement on 12/27/10.  Mediport removed in November 2011.    4. Cycle #6 of cisplatin/-16 chemotherapy initiated 3/1/11.  Complete response seen on subsequent CT scan dated 3/28/11.       5. 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   6. Status post PCI, completed 5-27-11.     7. Right thyroid nodule  8. Cancer related pain receiving hydrocodone 10/325  9. Metastatic non-small cell lung cancer:  · PET scan 7/11/2019 showing enlarging hypermetabolic left upper lobe/perihilar mass 2.8 cm (SUV 19) with additional 6 mm subpleural left upper lobe lesion (SUV 6.2), right medial 10th rib/costal vertebral activity (SUV 4.5), right iliac 3.5 cm lesion (SUV 11.8), left iliac activity (SUV 5.3).  Activity in distal rectum and collapsed colon of unclear significance.   · CT head (unable to obtain MRI) negative for metastatic disease on 7/24/2019  · Bronchoscopy/EBUS 7/22/2019 unable to reach perihilar mass.  Left upper lobe brushings/washings negative for malignancy.    · CT-guided biopsy left upper lobe mass 8/1/2019 negative for malignancy.    · CT-guided biopsy right iliac lesion on 8/14/19 with poorly differentiated carcinoma. PDL1 15%, insufficient material for molecular testing.  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  Will need to discuss possible referral to genetics clinic to help determine  if REEMA mutation is germline for benefit of other family members.  · Initiated palliative radiation to right iliac lesion 8/15/2019, completed on 8/29/2019  · Patient is not a candidate for chemotherapy due to comorbidities.  Discussed single agent palliative immunotherapy with Keytruda versus palliative/supportive care with hospice involvement.  Patient elected to pursue Keytruda, initiated treatment on 8/30/2019.  · Addition of Xgeva 9/28/2019 with plans to administer every 6 weeks to coincide with Keytruda schedule.  · PET scan following 3 cycles Keytruda 10/28/2019 with slight increase left upper lobe mass, slight increase left upper lobe pleural lesion, too small left upper lobe nodules, improvement right iliac lesion (was radiated), new small area of activity right acetabulum (no corresponding CT lesion).  Given length of time between previous PET scan and initiation of Keytruda and possibility of some areas representing pseudo-progression along with lack of alternative therapies elected to continue on Keytruda.  10. Immunotherapy induced skin rash:  · Developed at end of cycle 3 Keytruda involving forearms and chest, mild, grade 1  · Topical hydrocortisone 2.5% twice daily    HISTORY OF PRESENT ILLNESS:  The patient is a 74 y.o. year old female who is here for follow-up with the above-mentioned history.    History of Present Illness patient returns today in follow-up with laboratory studies to review continuing on Keytruda and monthly Xgeva.    She continues with a cough but denies shortness of breath.  She denies fevers or bleeding.  Her appetite is stable, though not great.  She is looking forward to her trip to [place] in January.  She continues using the hydrocortisone cream to her rash on her arms which is faint currently.    PAST MEDICAL HISTORY:   1. Chronic obstructive pulmonary disease.    2. Right low thoracic dermatome shingles in 02/12.    3. Cystoscopy with bladder biopsy in 05/2012.     4. Psoriasis identified in the right heel in 2013.    5. Osteoarthritis.  6. Colonoscopy 8/4/15 with 5 mm benign rectal polyp.  7. Right sided thyroid nodule.  8. Atrial fibrillation on anticoagulation with Eliquis 5 mg twice a day.  Pacemaker placed 3/13/17.    SURGICAL HISTORY:    1. Left mastectomy in 1998, left breast reconstruction and right breast implant placement in 1999.    2. Exploratory laparotomy.   3. Status post pacemaker placement left chest wall 3/13/17.     ONCOLOGIC HISTORY:  (History from previous dates can be found in the separate document.)  Past Medical History:   Diagnosis Date   • Aortic valve insufficiency    • Atrial fibrillation and flutter (CMS/HCC)    • Axillary lump    • Breast cancer (CMS/HCC)     Left, stage I; ER/IN positive   • Chronic kidney disease     Stage 3   • COPD (chronic obstructive pulmonary disease) (CMS/HCC)    • Dizziness     upon standing   • Fatigue    • Hypertension    • Lung cancer (CMS/HCC)    • Neutropenia (CMS/HCC)    • Osteoarthritis    • Paroxysmal atrial fibrillation (CMS/HCC)    • Peripheral neuropathy     Secondary to cisplatin.   • Pneumonia 06/2019   • Psoriasis     Identified in the right heel in 2013.   • Rectal polyp    • Shingles 02/2012    RIGHT LOW THORACIC    • Small cell lung cancer (CMS/HCC)     Limited stage   • Thyroid nodule     RIGHT   • Wheezing      Past Surgical History:   Procedure Laterality Date   • BREAST IMPLANT SURGERY Right 1999   • BREAST RECONSTRUCTION Left 1999   • BRONCHOSCOPY N/A 7/22/2019    Procedure: BRONCHOSCOPY WITH ENDOBRONCHIAL ULTRASOUND WITH BAL AND BRUSHINGS;  Surgeon: Johnathan Elliott MD;  Location: [facility] ENDOSCOPY;  Service: Pulmonary   • CARDIAC ELECTROPHYSIOLOGY PROCEDURE N/A 3/13/2017    Procedure: Device Implant  DDD pacer  medtronic;  Surgeon: Armani Kennedy MD;  Location: [facility] CATH INVASIVE LOCATION;  Service:    • CATARACT EXTRACTION Bilateral    • COLONOSCOPY     • CYSTOSCOPY BLADDER BIOPSY   05/2012   • EXPLORATORY LAPAROTOMY      Several years ago.   • MASTECTOMY Left 1998   • MEDIPORT INSERTION, SINGLE     • MEDIPORT REMOVAL Right    • PACEMAKER IMPLANTATION           Current Outpatient Medications on File Prior to Visit   Medication Sig Dispense Refill   • bumetanide (BUMEX) 2 MG tablet Take 2 mg by mouth Daily.     • ELIQUIS 5 MG tablet tablet TAKE 1 TABLET BY MOUTH TWICE DAILY 180 tablet 2   • HYDROcodone-acetaminophen (NORCO) 5-325 MG per tablet Take 1 tablet by mouth Every 6 (Six) Hours As Needed for Moderate Pain . 90 tablet 0   • hydrocortisone 2.5 % cream Apply  topically to the appropriate area as directed 2 (Two) Times a Day. 20 g 2   • ipratropium-albuterol (DUO-NEB) 0.5-2.5 mg/3 ml nebulizer VVN Q 4 H PRN  6   • metoprolol tartrate (LOPRESSOR) 25 MG tablet Take 37.5 mg by mouth 2 (Two) Times a Day.     • omeprazole (priLOSEC) 40 MG capsule Take 40 mg by mouth Daily.     • ondansetron (ZOFRAN) 8 MG tablet Take 1 tablet by mouth 3 (Three) Times a Day As Needed for Nausea or Vomiting. 30 tablet 5   • polyethylene glycol (MIRALAX) packet Take 17 g by mouth 2 (Two) Times a Day.     • potassium chloride (K-DUR,KLOR-CON) 20 MEQ CR tablet Take 20 mEq by mouth Daily.     • predniSONE (DELTASONE) 20 MG tablet      • senna (SENOKOT) 8.6 MG tablet Take 1 tablet by mouth Daily.     • [DISCONTINUED] albuterol (PROVENTIL) (5 MG/ML) 0.5% nebulizer solution Take 2.5 mg by nebulization Every 6 (Six) Hours As Needed for Wheezing.     • [DISCONTINUED] Fluticasone-Umeclidin-Vilant (TRELEGY ELLIPTA) 100-62.5-25 MCG/INH aerosol powder  Inhale 1 each Daily.       No current facility-administered medications on file prior to visit.        ALLERGIES:     Allergies   Allergen Reactions   • Moxifloxacin Nausea Only     Passed out after taking       SOCIAL HISTORY:  .  Drinks approximately three beers every other day.  Long-standing smoking history. No HIV risk factors.  Social History     Socioeconomic  History   • Marital status:      Spouse name: Dirk   • Number of children: Not on file   • Years of education: Not on file   • Highest education level: Not on file   Occupational History     Employer: RETIRED   Tobacco Use   • Smoking status: Former Smoker     Start date: 2011   • Smokeless tobacco: Never Used   • Tobacco comment: quit 6 years ago    //    caffeine use - one soft drink daily    Substance and Sexual Activity   • Alcohol use: No     Comment: Occasional beer, approximately 3 beers every other day.   • Drug use: No   • Sexual activity: Defer         FAMILY HISTORY:  Positive for melanoma in her mother, diagnosed at the age of 70 and  from heart attack at age of 80.  Father had colon cancer at age 70 and  at age of 80 of abdominal carcinomatosis.    Family History   Problem Relation Age of Onset   • Heart disease Mother    • Heart attack Mother    • Melanoma Mother 70   • Colon cancer Father 70   • Cancer Father         Abdominal carcinomatosis   • Stroke Neg Hx        Review of Systems   Constitutional: Positive for fatigue. Negative for activity change, appetite change, fever and unexpected weight change.   HENT: Negative for mouth sores, nosebleeds and voice change.    Eyes: Negative for itching.   Respiratory: Positive for cough. Negative for shortness of breath and wheezing.    Cardiovascular: Negative for palpitations and leg swelling.   Gastrointestinal: Negative for abdominal distention, blood in stool, constipation and diarrhea.   Endocrine: Negative for cold intolerance and heat intolerance.   Genitourinary: Negative for difficulty urinating, dysuria, frequency and hematuria.   Musculoskeletal: Positive for arthralgias. Negative for back pain, gait problem and neck pain.   Skin: Positive for rash.   Neurological: Negative for dizziness, syncope, weakness, light-headedness and numbness.   Hematological: Negative for adenopathy. Does not bruise/bleed easily.    Psychiatric/Behavioral: Negative for confusion and sleep disturbance. The patient is not nervous/anxious.          Objective      Vitals:    12/13/19 0908   BP: 106/71   Pulse: 110   Resp: 16   Temp: 98 °F (36.7 °C)   SpO2: 95%      Current Status 12/13/2019   ECOG score 1     Pain Score    12/13/19 0908   PainSc: 0-No pain         Physical Exam   Constitutional: She is oriented to person, place, and time. She appears well-developed and well-nourished.   Elderly female no distress seated in a wheelchair   HENT:   Head: Normocephalic.   Mouth/Throat: Oropharynx is clear and moist.   Eyes: Pupils are equal, round, and reactive to light. Conjunctivae and EOM are normal.   Neck: No thyromegaly present.   Cardiovascular: Normal rate and regular rhythm. Exam reveals no gallop and no friction rub.   No murmur heard.  Pulmonary/Chest: Effort normal and breath sounds normal. No respiratory distress. Right breast exhibits no mass. Left breast exhibits no mass.   Abdominal: Soft. Bowel sounds are normal. She exhibits no distension. There is no tenderness.   Musculoskeletal: She exhibits no edema.   Seated in wheelchair   Lymphadenopathy:     She has no cervical adenopathy.        Right: No supraclavicular adenopathy present.        Left: No supraclavicular adenopathy present.   Neurological: She is alert and oriented to person, place, and time. No cranial nerve deficit.   Skin: Skin is warm and dry. Rash noted.   Faint skin rash involving bilateral upper extremities and to a lesser extent upper chest with small erythematous macular areas 0.5 cm or less.  Similar to previous exam.   Psychiatric: She has a normal mood and affect. Her behavior is normal.   Vitals reviewed.      RECENT LABS:  Hematology WBC   Date Value Ref Range Status   12/13/2019 9.85 3.40 - 10.80 10*3/mm3 Final     RBC   Date Value Ref Range Status   12/13/2019 3.72 (L) 3.77 - 5.28 10*6/mm3 Final     Hemoglobin   Date Value Ref Range Status   12/13/2019 10.6  (L) 12.0 - 15.9 g/dL Final     Hematocrit   Date Value Ref Range Status   12/13/2019 32.6 (L) 34.0 - 46.6 % Final     Platelets   Date Value Ref Range Status   12/13/2019 381 140 - 450 10*3/mm3 Final        Lab Results   Component Value Date    GLUCOSE 132 (H) 11/22/2019    BUN 20 11/22/2019    CREATININE 1.09 11/22/2019    EGFRIFNONA 49 (L) 11/22/2019    BCR 18.3 11/22/2019    CO2 25.7 11/22/2019    CALCIUM 10.1 11/22/2019    ALBUMIN 4.20 11/22/2019    AST 10 11/22/2019    ALT 6 11/22/2019           Assessment/Plan      1. Limited stage small cell lung cancer:   · The patient completed concurrent chemoradiation with 6 cycles of cisplatin and -16 in March 2011 and achieved a complete response.   · She did receive PCI completing this in May 2011.    · The patient showed no evidence of recurrent disease.  2. Metastatic non-small cell lung cancer:  · Annual low-dose screening CT scan of the chest.  Follow-up study from 6/6/18 showed new findings with small to moderate right pleural effusion as well as right pleural thickening and potential lymphadenopathy along the right lower lobe bronchovascular bundle although difficult to identify with certainty.    · Suspicion for potential underlying malignancy. PET scan 6/15/18 showed no significant change in the right pleural effusion with low level activity in the right lower lobe bronchovascular bundle SUV of only 3.7.  There was therefore no definitive suspicious hypermetabolic activity to explain the pleural effusion.    · Diagnostic right thoracentesis on 6/20/18 with 250 cc clear yellow fluid removed with LDH 90, pH 7.5, total protein 1.8, appearing transudative with pathology showing no evidence of malignant cells. Etiology of the findings and pleural effusion unclear.  Patient referred back to cardiology and was started by PCP on Lasix 40 mg daily in July 2018.    · CT chest 7/13/18 with no change and negative/low probability V/Q scan 7/16/18.  · Repeat CT 8/17/18  showed near complete resolution of pleural effusions with only a minimal right pleural effusion remaining and some atelectasis/scarring in the right lower lobe.   · Follow-up CT chest 11/30/2018 with probable scar right lower lobe.  · Patient was hospitalized 5/22-5/27/2019 with suspected viral and bacterial pneumonia.  She did have evidence of metapneumovirus on respiratory viral panel.  CT chest 5/23/2019 showed increased consolidation right base consistent with infiltrate.  There was evidence of scattered groundglass opacity (likely the viral component).  There was an enlarging nodule however with irregular margins left upper lobe associated with left hilum worrisome for malignancy.   · PET scan 7/11/2019 showed enlarging hypermetabolic left upper lobe/perihilar mass 2.8 cm (SUV 19) with additional 6 mm subpleural left upper lobe lesion (SUV 6.2), right medial 10th rib/costal vertebral activity (SUV 4.5), right iliac 3.5 cm lesion (SUV 11.8), left iliac activity (SUV 5.3).  Activity in distal rectum and collapsed colon of unclear significance.   · CT head (unable to obtain MRI) negative for metastatic disease on 7/24/2019  · Bronchoscopy/EBUS 7/22/2019 unable to reach perihilar mass.  Left upper lobe brushings/washings negative for malignancy.    · CT-guided biopsy left upper lobe mass 8/1/2019- for malignancy.    · CT-guided biopsy right iliac lesion on 8/14/19 with poorly differentiated carcinoma. PDL1 15%, insufficient material for molecular testing.  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  Will need to discuss possible referral to genetics clinic to help determine if REEMA mutation is germline for benefit of other family members.  · Initiated palliative radiation to right iliac lesion 8/15/2019, completed on 8/29/2019  · Patient is not a candidate for chemotherapy due to  comorbidities.  Discussed single agent palliative immunotherapy with Keytruda versus palliative/supportive care with hospice involvement.  Patient elected to pursue Keytruda, initiated treatment 8/30/2019.  · Added Xgeva 9/20/2019, plan to treat every 6 weeks to coincide with Keytruda schedule.  · PET scan following 3 cycles Keytruda 10/28/2019 with slight increase left upper lobe mass, slight increase left upper lobe pleural lesion, too small left upper lobe nodules, improvement right iliac lesion (was radiated), new small area of activity right acetabulum (no corresponding CT lesion).  Given length of time between previous PET scan and initiation of Keytruda and possibility of some areas representing pseudo-progression along with lack of alternative therapies elected to continue on Keytruda.  · When seen for cycle 5 the patient had improved considerably over the past 3 weeks having spent time in [place].  Her appetite is improved, weight is improved.  Her skin rash is stable on topical hydrocortisone 2.5% twice daily.  She has been ambulating more with her walker.  Overall she has experienced medical improvement.    · She returns today for cycle 6, due both for Keytruda and Xgeva.  We are administering the Xgeva every 6 weeks to limit trips to the office.  She overall is feeling stable.  · In 2 weeks she will undergo PET scan and will follow-up with Dr. Almanzar in 3 weeks when she is due for cycle 7.  TSH and free T4 were normal today, recheck in 6 weeks per protocol.  3. Cancer related pain:   · Previous significant pain due to metastatic lesion in the right iliac bone  · Completed palliative radiation right iliac lesion 8/29/2019  · Improvement in right iliac pain following radiation.  · Patient requires only intermittent dosing of hydrocodone however feels that the 10/325 is sedating and has requested that we provide a 5/325 dose.    4. Narcotic induced constipation:  · Currently controlled with coconut oil  alone  5. History of stage I breast cancer on the left:   · The patient underwent a left mastectomy and completed 5 years of tamoxifen in July 2003.    · Exam on 6/8/2019 was negative.    · Annual right mammogram 9/5/18 was negative, BI-RADS 1.    · We are not pursuing further routine mammography due to patient's metastatic lung cancer.  6. 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 This does affect her balance; it is unchanged.   7. Stage III chronic kidney disease:   · Baseline creatinine in the 1.1-1.2 range.  · Creatinine today 1.09  8. Atrial fibrillation:   · Pacemaker placement 3/13/17 after having a syncopal episode.  · Patient continuing on Eliquis 5 mg twice daily.   9. Mild dementia:   · Short-term memory is very poor.  This has been an ongoing issue for her.  Her scans do show significant progression of small vessel ischemic change.  It is felt that some of this has been precipitated by her previous PCI.  10. Sigmoid colon uptake on PET scan:   · PET scan 6/15/2018 did show activity in the distal sigmoid colon/rectum and the patient was referred back to her gastroenterologist Dr. Colton Watkins.    · He did perform a flexible sigmoidoscopy which was negative on 8/2/18 and likely the activity represents muscular activity in the bowel wall.    · Subsequent PET scan 7/11/2019 showed continued activity in the distal rectum SUV 15.9.  There was also activity throughout the colon, possibly suggestive of inflammation/colitis.    · Patient relatively asymptomatic from GI standpoint.    · Patient was not felt to require any additional endoscopic evaluation at this point.  · PET scan 10/28/2019 with unchanged activity in the distal rectum.  11. Recurrent epistaxis:  · Patient is continuing on Eliquis for atrial fibrillation, 5 mg twice daily  · Patient developed recurrent epistaxis unclear whether unilateral or bilateral.  · Patient  referred to Dr. Dos Santos in ENT, refused cauterization  · Symptoms currently resolved  12. Immunotherapy induced skin rash:  · Mild grade 1 immunotherapy induced skin rash developed with macular erythematous lesions forearms and upper chest at end of cycle 3 Keytruda  · Prescribed topical hydrocortisone cream 2.5% twice daily on 11/1/2019.    · Skin rash stable today.  Patient instructed to call with any worsening symptoms    PLAN:     1. Proceed with cycle 6 Keytruda today.  2. Proceed with Xgeva today.  3. We will defer discussion regarding genetics evaluation for REEMA mutation until next visit.   4. Prescription sent for hydrocodone 5/325, #90.  5. Continue hydrocortisone 2.5% cream twice daily to affected areas of immunotherapy induced skin rash.  6. PET scan in 2 weeks  7. MD visit in 3 weeks with CBC, CMP, TSH, free T4 and cycle 7 Keytruda pending scan results.    The patient continues on high risk medication requiring intensive monitoring.

## 2019-12-17 NOTE — TELEPHONE ENCOUNTER
Patient seen in the clinic for pacemaker check with normal function. She c/o occasional episodes of chest pain. She states they come at random and only last a few seconds. They are not related to exertion. You are scheduled to see her next in May.

## 2020-01-01 ENCOUNTER — INFUSION (OUTPATIENT)
Dept: ONCOLOGY | Facility: HOSPITAL | Age: 75
End: 2020-01-01

## 2020-01-01 ENCOUNTER — APPOINTMENT (OUTPATIENT)
Dept: PET IMAGING | Facility: HOSPITAL | Age: 75
End: 2020-01-01

## 2020-01-01 ENCOUNTER — DOCUMENTATION (OUTPATIENT)
Dept: ONCOLOGY | Facility: CLINIC | Age: 75
End: 2020-01-01

## 2020-01-01 ENCOUNTER — TELEPHONE (OUTPATIENT)
Dept: ONCOLOGY | Facility: CLINIC | Age: 75
End: 2020-01-01

## 2020-01-01 ENCOUNTER — LAB (OUTPATIENT)
Dept: LAB | Facility: HOSPITAL | Age: 75
End: 2020-01-01

## 2020-01-01 ENCOUNTER — OFFICE VISIT (OUTPATIENT)
Dept: ONCOLOGY | Facility: CLINIC | Age: 75
End: 2020-01-01

## 2020-01-01 ENCOUNTER — APPOINTMENT (OUTPATIENT)
Dept: ONCOLOGY | Facility: HOSPITAL | Age: 75
End: 2020-01-01

## 2020-01-01 ENCOUNTER — HOSPITAL ENCOUNTER (OUTPATIENT)
Dept: PET IMAGING | Facility: HOSPITAL | Age: 75
Discharge: HOME | End: 2020-04-02
Admitting: INTERNAL MEDICINE

## 2020-01-01 ENCOUNTER — TELEPHONE (OUTPATIENT)
Dept: ONCOLOGY | Facility: HOSPITAL | Age: 75
End: 2020-01-01

## 2020-01-01 ENCOUNTER — HOSPITAL ENCOUNTER (INPATIENT)
Facility: HOSPITAL | Age: 75
LOS: 5 days | Discharge: HOSPICE | End: 2020-04-08
Attending: EMERGENCY MEDICINE | Admitting: INTERNAL MEDICINE

## 2020-01-01 ENCOUNTER — APPOINTMENT (OUTPATIENT)
Dept: GENERAL RADIOLOGY | Facility: HOSPITAL | Age: 75
End: 2020-01-01

## 2020-01-01 ENCOUNTER — SPECIALTY PHARMACY (OUTPATIENT)
Dept: PHARMACY | Facility: HOSPITAL | Age: 75
End: 2020-01-01

## 2020-01-01 ENCOUNTER — CLINICAL SUPPORT NO REQUIREMENTS (OUTPATIENT)
Dept: CARDIOLOGY | Facility: CLINIC | Age: 75
End: 2020-01-01

## 2020-01-01 ENCOUNTER — TELEPHONE (OUTPATIENT)
Dept: OTHER | Facility: HOSPITAL | Age: 75
End: 2020-01-01

## 2020-01-01 ENCOUNTER — APPOINTMENT (OUTPATIENT)
Dept: OTHER | Facility: HOSPITAL | Age: 75
End: 2020-01-01

## 2020-01-01 ENCOUNTER — HOSPITAL ENCOUNTER (OUTPATIENT)
Dept: PET IMAGING | Facility: HOSPITAL | Age: 75
Discharge: HOME | End: 2020-04-02

## 2020-01-01 ENCOUNTER — APPOINTMENT (OUTPATIENT)
Dept: LAB | Facility: HOSPITAL | Age: 75
End: 2020-01-01

## 2020-01-01 VITALS
TEMPERATURE: 98.3 F | WEIGHT: 176.5 LBS | DIASTOLIC BLOOD PRESSURE: 68 MMHG | HEART RATE: 88 BPM | RESPIRATION RATE: 20 BRPM | SYSTOLIC BLOOD PRESSURE: 119 MMHG | OXYGEN SATURATION: 95 % | BODY MASS INDEX: 28.37 KG/M2 | HEIGHT: 66 IN

## 2020-01-01 VITALS
WEIGHT: 186 LBS | RESPIRATION RATE: 12 BRPM | HEIGHT: 65 IN | BODY MASS INDEX: 30.99 KG/M2 | HEART RATE: 77 BPM | DIASTOLIC BLOOD PRESSURE: 70 MMHG | OXYGEN SATURATION: 98 % | SYSTOLIC BLOOD PRESSURE: 102 MMHG | TEMPERATURE: 98.4 F

## 2020-01-01 VITALS
DIASTOLIC BLOOD PRESSURE: 62 MMHG | HEART RATE: 71 BPM | TEMPERATURE: 97.6 F | SYSTOLIC BLOOD PRESSURE: 96 MMHG | BODY MASS INDEX: 31.24 KG/M2 | HEIGHT: 65 IN | RESPIRATION RATE: 16 BRPM | OXYGEN SATURATION: 95 % | WEIGHT: 187.5 LBS

## 2020-01-01 VITALS
RESPIRATION RATE: 16 BRPM | BODY MASS INDEX: 30.67 KG/M2 | HEART RATE: 117 BPM | OXYGEN SATURATION: 92 % | DIASTOLIC BLOOD PRESSURE: 52 MMHG | TEMPERATURE: 98 F | SYSTOLIC BLOOD PRESSURE: 87 MMHG | HEIGHT: 65 IN | WEIGHT: 184.1 LBS

## 2020-01-01 DIAGNOSIS — T45.1X5A CHEMOTHERAPY-INDUCED NEUROPATHY (HCC): ICD-10-CM

## 2020-01-01 DIAGNOSIS — C34.92 NON-SMALL CELL CANCER OF LEFT LUNG (HCC): ICD-10-CM

## 2020-01-01 DIAGNOSIS — C34.92 NON-SMALL CELL CANCER OF LEFT LUNG (HCC): Primary | ICD-10-CM

## 2020-01-01 DIAGNOSIS — Z79.899 HIGH RISK MEDICATION USE: ICD-10-CM

## 2020-01-01 DIAGNOSIS — I48.0 PAROXYSMAL ATRIAL FIBRILLATION (HCC): Primary | ICD-10-CM

## 2020-01-01 DIAGNOSIS — C34.12 MALIGNANT NEOPLASM OF UPPER LOBE, LEFT BRONCHUS OR LUNG (HCC): Primary | ICD-10-CM

## 2020-01-01 DIAGNOSIS — C34.12 MALIGNANT NEOPLASM OF UPPER LOBE, LEFT BRONCHUS OR LUNG (HCC): ICD-10-CM

## 2020-01-01 DIAGNOSIS — G62.0 CHEMOTHERAPY-INDUCED NEUROPATHY (HCC): ICD-10-CM

## 2020-01-01 DIAGNOSIS — I95.9 HYPOTENSION, UNSPECIFIED HYPOTENSION TYPE: ICD-10-CM

## 2020-01-01 DIAGNOSIS — C79.51 BONE METASTASIS: ICD-10-CM

## 2020-01-01 DIAGNOSIS — R06.02 SHORTNESS OF BREATH: Primary | ICD-10-CM

## 2020-01-01 DIAGNOSIS — C34.91 SMALL CELL CARCINOMA OF RIGHT LUNG (HCC): ICD-10-CM

## 2020-01-01 DIAGNOSIS — E86.0 DEHYDRATION: ICD-10-CM

## 2020-01-01 LAB
ALBUMIN SERPL-MCNC: 3.7 G/DL (ref 3.5–5.2)
ALBUMIN SERPL-MCNC: 3.9 G/DL (ref 3.5–5.2)
ALBUMIN/GLOB SERPL: 1.1 G/DL (ref 1.1–2.4)
ALBUMIN/GLOB SERPL: 1.3 G/DL
ALP SERPL-CCNC: 72 U/L (ref 38–116)
ALP SERPL-CCNC: 76 U/L (ref 39–117)
ALP SERPL-CCNC: 79 U/L (ref 38–116)
ALP SERPL-CCNC: 82 U/L (ref 38–116)
ALT SERPL W P-5'-P-CCNC: 5 U/L (ref 0–33)
ALT SERPL W P-5'-P-CCNC: 6 U/L (ref 1–33)
ALT SERPL W P-5'-P-CCNC: 7 U/L (ref 0–33)
ALT SERPL W P-5'-P-CCNC: <5 U/L (ref 0–33)
ANION GAP SERPL CALCULATED.3IONS-SCNC: 11 MMOL/L (ref 5–15)
ANION GAP SERPL CALCULATED.3IONS-SCNC: 12.6 MMOL/L (ref 5–15)
ANION GAP SERPL CALCULATED.3IONS-SCNC: 13.3 MMOL/L (ref 5–15)
ANION GAP SERPL CALCULATED.3IONS-SCNC: 13.5 MMOL/L (ref 5–15)
ANION GAP SERPL CALCULATED.3IONS-SCNC: 13.9 MMOL/L (ref 5–15)
ANION GAP SERPL CALCULATED.3IONS-SCNC: 14.1 MMOL/L (ref 5–15)
ANION GAP SERPL CALCULATED.3IONS-SCNC: 14.3 MMOL/L (ref 5–15)
ANION GAP SERPL CALCULATED.3IONS-SCNC: 15.3 MMOL/L (ref 5–15)
ANION GAP SERPL CALCULATED.3IONS-SCNC: 17.3 MMOL/L (ref 5–15)
AST SERPL-CCNC: 10 U/L (ref 0–32)
AST SERPL-CCNC: 10 U/L (ref 0–32)
AST SERPL-CCNC: 11 U/L (ref 0–32)
AST SERPL-CCNC: 6 U/L (ref 1–32)
B PARAPERT DNA SPEC QL NAA+PROBE: NOT DETECTED
B PERT DNA SPEC QL NAA+PROBE: NOT DETECTED
BACTERIA SPEC AEROBE CULT: NORMAL
BACTERIA SPEC AEROBE CULT: NORMAL
BASOPHILS # BLD AUTO: 0.01 10*3/MM3 (ref 0–0.2)
BASOPHILS # BLD AUTO: 0.01 10*3/MM3 (ref 0–0.2)
BASOPHILS # BLD AUTO: 0.03 10*3/MM3 (ref 0–0.2)
BASOPHILS # BLD AUTO: 0.05 10*3/MM3 (ref 0–0.2)
BASOPHILS # BLD AUTO: 0.05 10*3/MM3 (ref 0–0.2)
BASOPHILS # BLD AUTO: 0.06 10*3/MM3 (ref 0–0.2)
BASOPHILS NFR BLD AUTO: 0.1 % (ref 0–1.5)
BASOPHILS NFR BLD AUTO: 0.1 % (ref 0–1.5)
BASOPHILS NFR BLD AUTO: 0.2 % (ref 0–1.5)
BASOPHILS NFR BLD AUTO: 0.3 % (ref 0–1.5)
BASOPHILS NFR BLD AUTO: 0.4 % (ref 0–1.5)
BASOPHILS NFR BLD AUTO: 0.7 % (ref 0–1.5)
BILIRUB SERPL-MCNC: 0.3 MG/DL (ref 0.2–1.2)
BUN BLD-MCNC: 17 MG/DL (ref 8–23)
BUN BLD-MCNC: 18 MG/DL (ref 8–23)
BUN BLD-MCNC: 18 MG/DL (ref 8–23)
BUN BLD-MCNC: 19 MG/DL (ref 6–20)
BUN BLD-MCNC: 19 MG/DL (ref 6–20)
BUN BLD-MCNC: 20 MG/DL (ref 6–20)
BUN BLD-MCNC: 20 MG/DL (ref 8–23)
BUN BLD-MCNC: 20 MG/DL (ref 8–23)
BUN BLD-MCNC: 23 MG/DL (ref 8–23)
BUN/CREAT SERPL: 13.1 (ref 7–25)
BUN/CREAT SERPL: 13.7 (ref 7.3–30)
BUN/CREAT SERPL: 14.7 (ref 7.3–30)
BUN/CREAT SERPL: 15.7 (ref 7–25)
BUN/CREAT SERPL: 16.1 (ref 7–25)
BUN/CREAT SERPL: 17.1 (ref 7.3–30)
BUN/CREAT SERPL: 18.2 (ref 7–25)
BUN/CREAT SERPL: 19.4 (ref 7–25)
BUN/CREAT SERPL: 19.8 (ref 7–25)
C PNEUM DNA NPH QL NAA+NON-PROBE: NOT DETECTED
CALCIUM SPEC-SCNC: 10 MG/DL (ref 8.5–10.2)
CALCIUM SPEC-SCNC: 8 MG/DL (ref 8.6–10.5)
CALCIUM SPEC-SCNC: 8.4 MG/DL (ref 8.6–10.5)
CALCIUM SPEC-SCNC: 8.4 MG/DL (ref 8.6–10.5)
CALCIUM SPEC-SCNC: 8.5 MG/DL (ref 8.6–10.5)
CALCIUM SPEC-SCNC: 9 MG/DL (ref 8.5–10.2)
CALCIUM SPEC-SCNC: 9 MG/DL (ref 8.6–10.5)
CALCIUM SPEC-SCNC: 9.3 MG/DL (ref 8.6–10.5)
CALCIUM SPEC-SCNC: 9.4 MG/DL (ref 8.5–10.2)
CHLORIDE SERPL-SCNC: 100 MMOL/L (ref 98–107)
CHLORIDE SERPL-SCNC: 101 MMOL/L (ref 98–107)
CHLORIDE SERPL-SCNC: 101 MMOL/L (ref 98–107)
CHLORIDE SERPL-SCNC: 95 MMOL/L (ref 98–107)
CHLORIDE SERPL-SCNC: 95 MMOL/L (ref 98–107)
CHLORIDE SERPL-SCNC: 96 MMOL/L (ref 98–107)
CHLORIDE SERPL-SCNC: 97 MMOL/L (ref 98–107)
CHLORIDE SERPL-SCNC: 98 MMOL/L (ref 98–107)
CHLORIDE SERPL-SCNC: 98 MMOL/L (ref 98–107)
CO2 SERPL-SCNC: 16.7 MMOL/L (ref 22–29)
CO2 SERPL-SCNC: 18.7 MMOL/L (ref 22–29)
CO2 SERPL-SCNC: 18.7 MMOL/L (ref 22–29)
CO2 SERPL-SCNC: 20.4 MMOL/L (ref 22–29)
CO2 SERPL-SCNC: 23.7 MMOL/L (ref 22–29)
CO2 SERPL-SCNC: 24 MMOL/L (ref 22–29)
CO2 SERPL-SCNC: 24.9 MMOL/L (ref 22–29)
CO2 SERPL-SCNC: 26.1 MMOL/L (ref 22–29)
CO2 SERPL-SCNC: 26.5 MMOL/L (ref 22–29)
CREAT BLD-MCNC: 0.91 MG/DL (ref 0.57–1)
CREAT BLD-MCNC: 0.99 MG/DL (ref 0.57–1)
CREAT BLD-MCNC: 1.03 MG/DL (ref 0.57–1)
CREAT BLD-MCNC: 1.08 MG/DL (ref 0.57–1)
CREAT BLD-MCNC: 1.17 MG/DL (ref 0.6–1.1)
CREAT BLD-MCNC: 1.24 MG/DL (ref 0.57–1)
CREAT BLD-MCNC: 1.29 MG/DL (ref 0.6–1.1)
CREAT BLD-MCNC: 1.39 MG/DL (ref 0.6–1.1)
CREAT BLD-MCNC: 1.76 MG/DL (ref 0.57–1)
CRP SERPL-MCNC: 7.98 MG/DL (ref 0–0.5)
D-LACTATE SERPL-SCNC: 1.4 MMOL/L (ref 0.5–2)
DEPRECATED RDW RBC AUTO: 53.2 FL (ref 37–54)
DEPRECATED RDW RBC AUTO: 55.9 FL (ref 37–54)
DEPRECATED RDW RBC AUTO: 58.4 FL (ref 37–54)
DEPRECATED RDW RBC AUTO: 63.4 FL (ref 37–54)
DEPRECATED RDW RBC AUTO: 63.6 FL (ref 37–54)
DEPRECATED RDW RBC AUTO: 64.7 FL (ref 37–54)
DEPRECATED RDW RBC AUTO: 66.2 FL (ref 37–54)
DEPRECATED RDW RBC AUTO: 67 FL (ref 37–54)
DEPRECATED RDW RBC AUTO: 69 FL (ref 37–54)
EOSINOPHIL # BLD AUTO: 0.02 10*3/MM3 (ref 0–0.4)
EOSINOPHIL # BLD AUTO: 0.03 10*3/MM3 (ref 0–0.4)
EOSINOPHIL # BLD AUTO: 0.07 10*3/MM3 (ref 0–0.4)
EOSINOPHIL # BLD AUTO: 0.08 10*3/MM3 (ref 0–0.4)
EOSINOPHIL # BLD AUTO: 0.14 10*3/MM3 (ref 0–0.4)
EOSINOPHIL # BLD AUTO: 0.21 10*3/MM3 (ref 0–0.4)
EOSINOPHIL # BLD AUTO: 0.31 10*3/MM3 (ref 0–0.4)
EOSINOPHIL # BLD AUTO: 0.45 10*3/MM3 (ref 0–0.4)
EOSINOPHIL NFR BLD AUTO: 0.1 % (ref 0.3–6.2)
EOSINOPHIL NFR BLD AUTO: 0.2 % (ref 0.3–6.2)
EOSINOPHIL NFR BLD AUTO: 0.5 % (ref 0.3–6.2)
EOSINOPHIL NFR BLD AUTO: 0.5 % (ref 0.3–6.2)
EOSINOPHIL NFR BLD AUTO: 1.5 % (ref 0.3–6.2)
EOSINOPHIL NFR BLD AUTO: 2.3 % (ref 0.3–6.2)
EOSINOPHIL NFR BLD AUTO: 3.7 % (ref 0.3–6.2)
EOSINOPHIL NFR BLD AUTO: 3.9 % (ref 0.3–6.2)
ERYTHROCYTE [DISTWIDTH] IN BLOOD BY AUTOMATED COUNT: 16.1 % (ref 12.3–15.4)
ERYTHROCYTE [DISTWIDTH] IN BLOOD BY AUTOMATED COUNT: 16.1 % (ref 12.3–15.4)
ERYTHROCYTE [DISTWIDTH] IN BLOOD BY AUTOMATED COUNT: 17.2 % (ref 12.3–15.4)
ERYTHROCYTE [DISTWIDTH] IN BLOOD BY AUTOMATED COUNT: 19 % (ref 12.3–15.4)
ERYTHROCYTE [DISTWIDTH] IN BLOOD BY AUTOMATED COUNT: 19.1 % (ref 12.3–15.4)
ERYTHROCYTE [DISTWIDTH] IN BLOOD BY AUTOMATED COUNT: 19.4 % (ref 12.3–15.4)
ERYTHROCYTE [DISTWIDTH] IN BLOOD BY AUTOMATED COUNT: 19.5 % (ref 12.3–15.4)
ERYTHROCYTE [DISTWIDTH] IN BLOOD BY AUTOMATED COUNT: 19.9 % (ref 12.3–15.4)
ERYTHROCYTE [DISTWIDTH] IN BLOOD BY AUTOMATED COUNT: 20.2 % (ref 12.3–15.4)
FERRITIN SERPL-MCNC: 347 NG/ML (ref 13–150)
FLUAV H1 2009 PAND RNA NPH QL NAA+PROBE: NOT DETECTED
FLUAV H1 HA GENE NPH QL NAA+PROBE: NOT DETECTED
FLUAV H3 RNA NPH QL NAA+PROBE: NOT DETECTED
FLUAV SUBTYP SPEC NAA+PROBE: NOT DETECTED
FLUBV RNA ISLT QL NAA+PROBE: NOT DETECTED
GFR SERPL CREATININE-BSD FRML MDRD: 28 ML/MIN/1.73
GFR SERPL CREATININE-BSD FRML MDRD: 37 ML/MIN/1.73
GFR SERPL CREATININE-BSD FRML MDRD: 40 ML/MIN/1.73
GFR SERPL CREATININE-BSD FRML MDRD: 42 ML/MIN/1.73
GFR SERPL CREATININE-BSD FRML MDRD: 45 ML/MIN/1.73
GFR SERPL CREATININE-BSD FRML MDRD: 49 ML/MIN/1.73
GFR SERPL CREATININE-BSD FRML MDRD: 52 ML/MIN/1.73
GFR SERPL CREATININE-BSD FRML MDRD: 55 ML/MIN/1.73
GFR SERPL CREATININE-BSD FRML MDRD: 60 ML/MIN/1.73
GLOBULIN UR ELPH-MCNC: 2.9 GM/DL
GLOBULIN UR ELPH-MCNC: 3.5 GM/DL (ref 1.8–3.5)
GLOBULIN UR ELPH-MCNC: 3.5 GM/DL (ref 1.8–3.5)
GLOBULIN UR ELPH-MCNC: 3.6 GM/DL (ref 1.8–3.5)
GLUCOSE BLD-MCNC: 106 MG/DL (ref 74–124)
GLUCOSE BLD-MCNC: 113 MG/DL (ref 65–99)
GLUCOSE BLD-MCNC: 117 MG/DL (ref 65–99)
GLUCOSE BLD-MCNC: 121 MG/DL (ref 65–99)
GLUCOSE BLD-MCNC: 138 MG/DL (ref 65–99)
GLUCOSE BLD-MCNC: 148 MG/DL (ref 65–99)
GLUCOSE BLD-MCNC: 157 MG/DL (ref 65–99)
GLUCOSE BLD-MCNC: 158 MG/DL (ref 74–124)
GLUCOSE BLD-MCNC: 174 MG/DL (ref 74–124)
GLUCOSE BLDC GLUCOMTR-MCNC: 109 MG/DL (ref 70–130)
HADV DNA SPEC NAA+PROBE: NOT DETECTED
HCOV 229E RNA SPEC QL NAA+PROBE: NOT DETECTED
HCOV HKU1 RNA SPEC QL NAA+PROBE: NOT DETECTED
HCOV NL63 RNA SPEC QL NAA+PROBE: NOT DETECTED
HCOV OC43 RNA SPEC QL NAA+PROBE: NOT DETECTED
HCT VFR BLD AUTO: 25.8 % (ref 34–46.6)
HCT VFR BLD AUTO: 26.4 % (ref 34–46.6)
HCT VFR BLD AUTO: 27 % (ref 34–46.6)
HCT VFR BLD AUTO: 28.9 % (ref 34–46.6)
HCT VFR BLD AUTO: 29.3 % (ref 34–46.6)
HCT VFR BLD AUTO: 29.8 % (ref 34–46.6)
HCT VFR BLD AUTO: 31.8 % (ref 34–46.6)
HCT VFR BLD AUTO: 33.8 % (ref 34–46.6)
HCT VFR BLD AUTO: 34.7 % (ref 34–46.6)
HGB BLD-MCNC: 10 G/DL (ref 12–15.9)
HGB BLD-MCNC: 10.8 G/DL (ref 12–15.9)
HGB BLD-MCNC: 8.4 G/DL (ref 12–15.9)
HGB BLD-MCNC: 8.8 G/DL (ref 12–15.9)
HGB BLD-MCNC: 8.8 G/DL (ref 12–15.9)
HGB BLD-MCNC: 9.4 G/DL (ref 12–15.9)
HGB BLD-MCNC: 9.5 G/DL (ref 12–15.9)
HMPV RNA NPH QL NAA+NON-PROBE: NOT DETECTED
HPIV1 RNA SPEC QL NAA+PROBE: NOT DETECTED
HPIV2 RNA SPEC QL NAA+PROBE: NOT DETECTED
HPIV3 RNA NPH QL NAA+PROBE: NOT DETECTED
HPIV4 P GENE NPH QL NAA+PROBE: NOT DETECTED
IMM GRANULOCYTES # BLD AUTO: 0.05 10*3/MM3 (ref 0–0.05)
IMM GRANULOCYTES # BLD AUTO: 0.07 10*3/MM3 (ref 0–0.05)
IMM GRANULOCYTES # BLD AUTO: 0.11 10*3/MM3 (ref 0–0.05)
IMM GRANULOCYTES # BLD AUTO: 0.11 10*3/MM3 (ref 0–0.05)
IMM GRANULOCYTES # BLD AUTO: 0.13 10*3/MM3 (ref 0–0.05)
IMM GRANULOCYTES # BLD AUTO: 0.15 10*3/MM3 (ref 0–0.05)
IMM GRANULOCYTES # BLD AUTO: 0.16 10*3/MM3 (ref 0–0.05)
IMM GRANULOCYTES # BLD AUTO: 0.18 10*3/MM3 (ref 0–0.05)
IMM GRANULOCYTES NFR BLD AUTO: 0.6 % (ref 0–0.5)
IMM GRANULOCYTES NFR BLD AUTO: 0.8 % (ref 0–0.5)
IMM GRANULOCYTES NFR BLD AUTO: 0.9 % (ref 0–0.5)
IMM GRANULOCYTES NFR BLD AUTO: 1 % (ref 0–0.5)
IMM GRANULOCYTES NFR BLD AUTO: 1 % (ref 0–0.5)
IMM GRANULOCYTES NFR BLD AUTO: 1.1 % (ref 0–0.5)
IMM GRANULOCYTES NFR BLD AUTO: 1.2 % (ref 0–0.5)
IMM GRANULOCYTES NFR BLD AUTO: 1.2 % (ref 0–0.5)
L PNEUMO1 AG UR QL IA: NEGATIVE
LDH SERPL-CCNC: 132 U/L (ref 135–214)
LYMPHOCYTES # BLD AUTO: 0.73 10*3/MM3 (ref 0.7–3.1)
LYMPHOCYTES # BLD AUTO: 0.76 10*3/MM3 (ref 0.7–3.1)
LYMPHOCYTES # BLD AUTO: 0.88 10*3/MM3 (ref 0.7–3.1)
LYMPHOCYTES # BLD AUTO: 0.98 10*3/MM3 (ref 0.7–3.1)
LYMPHOCYTES # BLD AUTO: 1.18 10*3/MM3 (ref 0.7–3.1)
LYMPHOCYTES # BLD AUTO: 1.24 10*3/MM3 (ref 0.7–3.1)
LYMPHOCYTES # BLD AUTO: 1.25 10*3/MM3 (ref 0.7–3.1)
LYMPHOCYTES # BLD AUTO: 1.28 10*3/MM3 (ref 0.7–3.1)
LYMPHOCYTES NFR BLD AUTO: 10.7 % (ref 19.6–45.3)
LYMPHOCYTES NFR BLD AUTO: 6.3 % (ref 19.6–45.3)
LYMPHOCYTES NFR BLD AUTO: 7.6 % (ref 19.6–45.3)
LYMPHOCYTES NFR BLD AUTO: 8.2 % (ref 19.6–45.3)
LYMPHOCYTES NFR BLD AUTO: 9.1 % (ref 19.6–45.3)
LYMPHOCYTES NFR BLD AUTO: 9.2 % (ref 19.6–45.3)
LYMPHOCYTES NFR BLD AUTO: 9.7 % (ref 19.6–45.3)
LYMPHOCYTES NFR BLD AUTO: 9.7 % (ref 19.6–45.3)
M PNEUMO IGG SER IA-ACNC: NOT DETECTED
MAGNESIUM SERPL-MCNC: 1.7 MG/DL (ref 1.8–2.5)
MAGNESIUM SERPL-MCNC: 2.1 MG/DL (ref 1.8–2.5)
MCH RBC QN AUTO: 27.8 PG (ref 26.6–33)
MCH RBC QN AUTO: 28.1 PG (ref 26.6–33)
MCH RBC QN AUTO: 28.1 PG (ref 26.6–33)
MCH RBC QN AUTO: 29.6 PG (ref 26.6–33)
MCH RBC QN AUTO: 29.8 PG (ref 26.6–33)
MCH RBC QN AUTO: 30 PG (ref 26.6–33)
MCH RBC QN AUTO: 30.3 PG (ref 26.6–33)
MCH RBC QN AUTO: 30.4 PG (ref 26.6–33)
MCH RBC QN AUTO: 30.6 PG (ref 26.6–33)
MCHC RBC AUTO-ENTMCNC: 29.6 G/DL (ref 31.5–35.7)
MCHC RBC AUTO-ENTMCNC: 29.6 G/DL (ref 31.5–35.7)
MCHC RBC AUTO-ENTMCNC: 31.1 G/DL (ref 31.5–35.7)
MCHC RBC AUTO-ENTMCNC: 31.9 G/DL (ref 31.5–35.7)
MCHC RBC AUTO-ENTMCNC: 32.1 G/DL (ref 31.5–35.7)
MCHC RBC AUTO-ENTMCNC: 32.5 G/DL (ref 31.5–35.7)
MCHC RBC AUTO-ENTMCNC: 32.6 G/DL (ref 31.5–35.7)
MCHC RBC AUTO-ENTMCNC: 32.6 G/DL (ref 31.5–35.7)
MCHC RBC AUTO-ENTMCNC: 33.3 G/DL (ref 31.5–35.7)
MCV RBC AUTO: 90.4 FL (ref 79–97)
MCV RBC AUTO: 91.7 FL (ref 79–97)
MCV RBC AUTO: 91.7 FL (ref 79–97)
MCV RBC AUTO: 92.1 FL (ref 79–97)
MCV RBC AUTO: 92.1 FL (ref 79–97)
MCV RBC AUTO: 93.4 FL (ref 79–97)
MCV RBC AUTO: 93.9 FL (ref 79–97)
MCV RBC AUTO: 94.9 FL (ref 79–97)
MCV RBC AUTO: 95.2 FL (ref 79–97)
MONOCYTES # BLD AUTO: 0.53 10*3/MM3 (ref 0.1–0.9)
MONOCYTES # BLD AUTO: 0.57 10*3/MM3 (ref 0.1–0.9)
MONOCYTES # BLD AUTO: 0.6 10*3/MM3 (ref 0.1–0.9)
MONOCYTES # BLD AUTO: 0.68 10*3/MM3 (ref 0.1–0.9)
MONOCYTES # BLD AUTO: 0.83 10*3/MM3 (ref 0.1–0.9)
MONOCYTES # BLD AUTO: 0.85 10*3/MM3 (ref 0.1–0.9)
MONOCYTES # BLD AUTO: 0.87 10*3/MM3 (ref 0.1–0.9)
MONOCYTES # BLD AUTO: 0.92 10*3/MM3 (ref 0.1–0.9)
MONOCYTES NFR BLD AUTO: 4.9 % (ref 5–12)
MONOCYTES NFR BLD AUTO: 5.6 % (ref 5–12)
MONOCYTES NFR BLD AUTO: 6.1 % (ref 5–12)
MONOCYTES NFR BLD AUTO: 6.3 % (ref 5–12)
MONOCYTES NFR BLD AUTO: 6.4 % (ref 5–12)
MONOCYTES NFR BLD AUTO: 6.5 % (ref 5–12)
MONOCYTES NFR BLD AUTO: 6.6 % (ref 5–12)
MONOCYTES NFR BLD AUTO: 7.1 % (ref 5–12)
NEUTROPHILS # BLD AUTO: 10.62 10*3/MM3 (ref 1.7–7)
NEUTROPHILS # BLD AUTO: 11.7 10*3/MM3 (ref 1.7–7)
NEUTROPHILS # BLD AUTO: 12.7 10*3/MM3 (ref 1.7–7)
NEUTROPHILS # BLD AUTO: 13.18 10*3/MM3 (ref 1.7–7)
NEUTROPHILS # BLD AUTO: 6.36 10*3/MM3 (ref 1.7–7)
NEUTROPHILS # BLD AUTO: 7.26 10*3/MM3 (ref 1.7–7)
NEUTROPHILS # BLD AUTO: 7.32 10*3/MM3 (ref 1.7–7)
NEUTROPHILS # BLD AUTO: 9.82 10*3/MM3 (ref 1.7–7)
NEUTROPHILS NFR BLD AUTO: 79.1 % (ref 42.7–76)
NEUTROPHILS NFR BLD AUTO: 79.8 % (ref 42.7–76)
NEUTROPHILS NFR BLD AUTO: 79.8 % (ref 42.7–76)
NEUTROPHILS NFR BLD AUTO: 81.6 % (ref 42.7–76)
NEUTROPHILS NFR BLD AUTO: 82.2 % (ref 42.7–76)
NEUTROPHILS NFR BLD AUTO: 84.2 % (ref 42.7–76)
NEUTROPHILS NFR BLD AUTO: 84.6 % (ref 42.7–76)
NEUTROPHILS NFR BLD AUTO: 85 % (ref 42.7–76)
NRBC BLD AUTO-RTO: 0 /100 WBC (ref 0–0.2)
NRBC BLD AUTO-RTO: 0.1 /100 WBC (ref 0–0.2)
PHOSPHATE SERPL-MCNC: 2.8 MG/DL (ref 2.5–4.5)
PHOSPHATE SERPL-MCNC: 3.2 MG/DL (ref 2.5–4.5)
PLATELET # BLD AUTO: 291 10*3/MM3 (ref 140–450)
PLATELET # BLD AUTO: 312 10*3/MM3 (ref 140–450)
PLATELET # BLD AUTO: 313 10*3/MM3 (ref 140–450)
PLATELET # BLD AUTO: 333 10*3/MM3 (ref 140–450)
PLATELET # BLD AUTO: 338 10*3/MM3 (ref 140–450)
PLATELET # BLD AUTO: 340 10*3/MM3 (ref 140–450)
PLATELET # BLD AUTO: 358 10*3/MM3 (ref 140–450)
PLATELET # BLD AUTO: 366 10*3/MM3 (ref 140–450)
PLATELET # BLD AUTO: 370 10*3/MM3 (ref 140–450)
PMV BLD AUTO: 8.8 FL (ref 6–12)
PMV BLD AUTO: 8.9 FL (ref 6–12)
PMV BLD AUTO: 8.9 FL (ref 6–12)
PMV BLD AUTO: 9.2 FL (ref 6–12)
PMV BLD AUTO: 9.2 FL (ref 6–12)
PMV BLD AUTO: 9.3 FL (ref 6–12)
PMV BLD AUTO: 9.4 FL (ref 6–12)
POTASSIUM BLD-SCNC: 4 MMOL/L (ref 3.5–5.2)
POTASSIUM BLD-SCNC: 4.2 MMOL/L (ref 3.5–5.2)
POTASSIUM BLD-SCNC: 4.2 MMOL/L (ref 3.5–5.2)
POTASSIUM BLD-SCNC: 4.3 MMOL/L (ref 3.5–5.2)
POTASSIUM BLD-SCNC: 4.4 MMOL/L (ref 3.5–4.7)
POTASSIUM BLD-SCNC: 4.5 MMOL/L (ref 3.5–4.7)
POTASSIUM BLD-SCNC: 4.6 MMOL/L (ref 3.5–4.7)
POTASSIUM BLD-SCNC: 4.6 MMOL/L (ref 3.5–5.2)
POTASSIUM BLD-SCNC: 4.9 MMOL/L (ref 3.5–5.2)
PROCALCITONIN SERPL-MCNC: 0.28 NG/ML (ref 0.1–0.25)
PROT SERPL-MCNC: 6.6 G/DL (ref 6–8.5)
PROT SERPL-MCNC: 7.4 G/DL (ref 6.3–8)
PROT SERPL-MCNC: 7.4 G/DL (ref 6.3–8)
PROT SERPL-MCNC: 7.5 G/DL (ref 6.3–8)
RBC # BLD AUTO: 2.8 10*6/MM3 (ref 3.77–5.28)
RBC # BLD AUTO: 2.88 10*6/MM3 (ref 3.77–5.28)
RBC # BLD AUTO: 2.89 10*6/MM3 (ref 3.77–5.28)
RBC # BLD AUTO: 3.14 10*6/MM3 (ref 3.77–5.28)
RBC # BLD AUTO: 3.15 10*6/MM3 (ref 3.77–5.28)
RBC # BLD AUTO: 3.18 10*6/MM3 (ref 3.77–5.28)
RBC # BLD AUTO: 3.34 10*6/MM3 (ref 3.77–5.28)
RBC # BLD AUTO: 3.6 10*6/MM3 (ref 3.77–5.28)
RBC # BLD AUTO: 3.84 10*6/MM3 (ref 3.77–5.28)
REF LAB TEST METHOD: NORMAL
RHINOVIRUS RNA SPEC NAA+PROBE: NOT DETECTED
RSV RNA NPH QL NAA+NON-PROBE: NOT DETECTED
S PNEUM AG SPEC QL LA: NEGATIVE
SARS-COV-2 RNA RESP QL NAA+PROBE: NOT DETECTED
SODIUM BLD-SCNC: 132 MMOL/L (ref 136–145)
SODIUM BLD-SCNC: 132 MMOL/L (ref 136–145)
SODIUM BLD-SCNC: 133 MMOL/L (ref 136–145)
SODIUM BLD-SCNC: 133 MMOL/L (ref 136–145)
SODIUM BLD-SCNC: 134 MMOL/L (ref 134–145)
SODIUM BLD-SCNC: 134 MMOL/L (ref 136–145)
SODIUM BLD-SCNC: 135 MMOL/L (ref 134–145)
SODIUM BLD-SCNC: 135 MMOL/L (ref 136–145)
SODIUM BLD-SCNC: 138 MMOL/L (ref 134–145)
T4 FREE SERPL-MCNC: 1.38 NG/DL (ref 0.93–1.7)
TROPONIN T SERPL-MCNC: 0.01 NG/ML (ref 0–0.03)
TSH SERPL DL<=0.05 MIU/L-ACNC: 0.54 UIU/ML (ref 0.27–4.2)
TSH SERPL DL<=0.05 MIU/L-ACNC: 2.1 UIU/ML (ref 0.27–4.2)
WBC NRBC COR # BLD: 12.04 10*3/MM3 (ref 3.4–10.8)
WBC NRBC COR # BLD: 12.77 10*3/MM3 (ref 3.4–10.8)
WBC NRBC COR # BLD: 12.93 10*3/MM3 (ref 3.4–10.8)
WBC NRBC COR # BLD: 13.84 10*3/MM3 (ref 3.4–10.8)
WBC NRBC COR # BLD: 15.08 10*3/MM3 (ref 3.4–10.8)
WBC NRBC COR # BLD: 15.52 10*3/MM3 (ref 3.4–10.8)
WBC NRBC COR # BLD: 8.04 10*3/MM3 (ref 3.4–10.8)
WBC NRBC COR # BLD: 9.09 10*3/MM3 (ref 3.4–10.8)
WBC NRBC COR # BLD: 9.17 10*3/MM3 (ref 3.4–10.8)

## 2020-01-01 PROCEDURE — 80053 COMPREHEN METABOLIC PANEL: CPT

## 2020-01-01 PROCEDURE — 63710000001 PREDNISONE PER 1 MG: Performed by: INTERNAL MEDICINE

## 2020-01-01 PROCEDURE — 25010000002 ENOXAPARIN PER 10 MG: Performed by: HOSPITALIST

## 2020-01-01 PROCEDURE — 94799 UNLISTED PULMONARY SVC/PX: CPT

## 2020-01-01 PROCEDURE — 36415 COLL VENOUS BLD VENIPUNCTURE: CPT

## 2020-01-01 PROCEDURE — 93294 REM INTERROG EVL PM/LDLS PM: CPT | Performed by: INTERNAL MEDICINE

## 2020-01-01 PROCEDURE — U0002 COVID-19 LAB TEST NON-CDC: HCPCS | Performed by: EMERGENCY MEDICINE

## 2020-01-01 PROCEDURE — 80053 COMPREHEN METABOLIC PANEL: CPT | Performed by: EMERGENCY MEDICINE

## 2020-01-01 PROCEDURE — 25010000002 CEFTRIAXONE PER 250 MG: Performed by: INTERNAL MEDICINE

## 2020-01-01 PROCEDURE — 99231 SBSQ HOSP IP/OBS SF/LOW 25: CPT | Performed by: INTERNAL MEDICINE

## 2020-01-01 PROCEDURE — 25010000002 ONDANSETRON PER 1 MG: Performed by: HOSPITALIST

## 2020-01-01 PROCEDURE — 85025 COMPLETE CBC W/AUTO DIFF WBC: CPT | Performed by: NURSE PRACTITIONER

## 2020-01-01 PROCEDURE — 86140 C-REACTIVE PROTEIN: CPT | Performed by: EMERGENCY MEDICINE

## 2020-01-01 PROCEDURE — 99233 SBSQ HOSP IP/OBS HIGH 50: CPT | Performed by: INTERNAL MEDICINE

## 2020-01-01 PROCEDURE — 96372 THER/PROPH/DIAG INJ SC/IM: CPT

## 2020-01-01 PROCEDURE — 80048 BASIC METABOLIC PNL TOTAL CA: CPT | Performed by: INTERNAL MEDICINE

## 2020-01-01 PROCEDURE — 87899 AGENT NOS ASSAY W/OPTIC: CPT | Performed by: INTERNAL MEDICINE

## 2020-01-01 PROCEDURE — 84439 ASSAY OF FREE THYROXINE: CPT | Performed by: INTERNAL MEDICINE

## 2020-01-01 PROCEDURE — 80048 BASIC METABOLIC PNL TOTAL CA: CPT | Performed by: NURSE PRACTITIONER

## 2020-01-01 PROCEDURE — 85025 COMPLETE CBC W/AUTO DIFF WBC: CPT | Performed by: EMERGENCY MEDICINE

## 2020-01-01 PROCEDURE — 84100 ASSAY OF PHOSPHORUS: CPT

## 2020-01-01 PROCEDURE — 83735 ASSAY OF MAGNESIUM: CPT

## 2020-01-01 PROCEDURE — 82962 GLUCOSE BLOOD TEST: CPT

## 2020-01-01 PROCEDURE — 78815 PET IMAGE W/CT SKULL-THIGH: CPT

## 2020-01-01 PROCEDURE — 25010000002 DENOSUMAB 120 MG/1.7ML SOLUTION: Performed by: INTERNAL MEDICINE

## 2020-01-01 PROCEDURE — 85025 COMPLETE CBC W/AUTO DIFF WBC: CPT

## 2020-01-01 PROCEDURE — 99214 OFFICE O/P EST MOD 30 MIN: CPT | Performed by: INTERNAL MEDICINE

## 2020-01-01 PROCEDURE — 63710000001 PREDNISONE PER 1 MG: Performed by: HOSPITALIST

## 2020-01-01 PROCEDURE — 85027 COMPLETE CBC AUTOMATED: CPT | Performed by: HOSPITALIST

## 2020-01-01 PROCEDURE — 84484 ASSAY OF TROPONIN QUANT: CPT | Performed by: EMERGENCY MEDICINE

## 2020-01-01 PROCEDURE — 0 FLUDEOXYGLUCOSE F18 SOLUTION: Performed by: INTERNAL MEDICINE

## 2020-01-01 PROCEDURE — A9552 F18 FDG: HCPCS | Performed by: INTERNAL MEDICINE

## 2020-01-01 PROCEDURE — 80048 BASIC METABOLIC PNL TOTAL CA: CPT | Performed by: HOSPITALIST

## 2020-01-01 PROCEDURE — 87040 BLOOD CULTURE FOR BACTERIA: CPT | Performed by: EMERGENCY MEDICINE

## 2020-01-01 PROCEDURE — 94640 AIRWAY INHALATION TREATMENT: CPT

## 2020-01-01 PROCEDURE — 36415 COLL VENOUS BLD VENIPUNCTURE: CPT | Performed by: EMERGENCY MEDICINE

## 2020-01-01 PROCEDURE — 99222 1ST HOSP IP/OBS MODERATE 55: CPT | Performed by: INTERNAL MEDICINE

## 2020-01-01 PROCEDURE — 99284 EMERGENCY DEPT VISIT MOD MDM: CPT

## 2020-01-01 PROCEDURE — 83615 LACTATE (LD) (LDH) ENZYME: CPT | Performed by: EMERGENCY MEDICINE

## 2020-01-01 PROCEDURE — 93296 REM INTERROG EVL PM/IDS: CPT | Performed by: INTERNAL MEDICINE

## 2020-01-01 PROCEDURE — 99212-NC PR NO CHARGE CBC OFFICE OUTPATIENT VISIT 10 MINUTES: Performed by: NURSE PRACTITIONER

## 2020-01-01 PROCEDURE — 85025 COMPLETE CBC W/AUTO DIFF WBC: CPT | Performed by: INTERNAL MEDICINE

## 2020-01-01 PROCEDURE — 99215 OFFICE O/P EST HI 40 MIN: CPT | Performed by: INTERNAL MEDICINE

## 2020-01-01 PROCEDURE — 84145 PROCALCITONIN (PCT): CPT | Performed by: EMERGENCY MEDICINE

## 2020-01-01 PROCEDURE — 84443 ASSAY THYROID STIM HORMONE: CPT | Performed by: INTERNAL MEDICINE

## 2020-01-01 PROCEDURE — 0100U HC BIOFIRE FILMARRAY RESP PANEL 2: CPT | Performed by: EMERGENCY MEDICINE

## 2020-01-01 PROCEDURE — 96372 THER/PROPH/DIAG INJ SC/IM: CPT | Performed by: INTERNAL MEDICINE

## 2020-01-01 PROCEDURE — 93005 ELECTROCARDIOGRAM TRACING: CPT | Performed by: EMERGENCY MEDICINE

## 2020-01-01 PROCEDURE — 83605 ASSAY OF LACTIC ACID: CPT | Performed by: EMERGENCY MEDICINE

## 2020-01-01 PROCEDURE — 93010 ELECTROCARDIOGRAM REPORT: CPT | Performed by: INTERNAL MEDICINE

## 2020-01-01 PROCEDURE — 82728 ASSAY OF FERRITIN: CPT | Performed by: INTERNAL MEDICINE

## 2020-01-01 PROCEDURE — 71045 X-RAY EXAM CHEST 1 VIEW: CPT

## 2020-01-01 RX ORDER — ALPRAZOLAM 0.25 MG/1
0.25 TABLET ORAL 2 TIMES DAILY PRN
Status: DISCONTINUED | OUTPATIENT
Start: 2020-01-01 | End: 2020-01-01 | Stop reason: HOSPADM

## 2020-01-01 RX ORDER — SODIUM CHLORIDE 9 MG/ML
125 INJECTION, SOLUTION INTRAVENOUS CONTINUOUS
Status: DISCONTINUED | OUTPATIENT
Start: 2020-01-01 | End: 2020-01-01

## 2020-01-01 RX ORDER — AMOXICILLIN 250 MG
2 CAPSULE ORAL 2 TIMES DAILY PRN
Status: DISCONTINUED | OUTPATIENT
Start: 2020-01-01 | End: 2020-01-01 | Stop reason: HOSPADM

## 2020-01-01 RX ORDER — ONDANSETRON 4 MG/1
4 TABLET, FILM COATED ORAL EVERY 6 HOURS PRN
Status: DISCONTINUED | OUTPATIENT
Start: 2020-01-01 | End: 2020-01-01 | Stop reason: HOSPADM

## 2020-01-01 RX ORDER — L.ACID,PARA/B.BIFIDUM/S.THERM 8B CELL
1 CAPSULE ORAL DAILY
Status: DISCONTINUED | OUTPATIENT
Start: 2020-01-01 | End: 2020-01-01 | Stop reason: HOSPADM

## 2020-01-01 RX ORDER — FENTANYL 12 UG/H
1 PATCH TRANSDERMAL
Qty: 3 PATCH | Refills: 0 | Status: SHIPPED | OUTPATIENT
Start: 2020-01-01 | End: 2020-01-01

## 2020-01-01 RX ORDER — PREDNISONE 20 MG/1
40 TABLET ORAL DAILY
Status: DISCONTINUED | OUTPATIENT
Start: 2020-01-01 | End: 2020-01-01 | Stop reason: HOSPADM

## 2020-01-01 RX ORDER — ALPRAZOLAM 0.25 MG/1
0.25 TABLET ORAL 2 TIMES DAILY PRN
Qty: 14 TABLET | Refills: 0 | Status: SHIPPED | OUTPATIENT
Start: 2020-01-01 | End: 2020-01-01

## 2020-01-01 RX ORDER — BUMETANIDE 2 MG/1
2 TABLET ORAL DAILY
Status: DISCONTINUED | OUTPATIENT
Start: 2020-01-01 | End: 2020-01-01 | Stop reason: HOSPADM

## 2020-01-01 RX ORDER — HYDROCODONE BITARTRATE AND ACETAMINOPHEN 5; 325 MG/1; MG/1
1 TABLET ORAL EVERY 6 HOURS PRN
Qty: 90 TABLET | Refills: 0 | Status: SHIPPED | OUTPATIENT
Start: 2020-01-01 | End: 2020-01-01 | Stop reason: SDUPTHER

## 2020-01-01 RX ORDER — IPRATROPIUM BROMIDE AND ALBUTEROL SULFATE 2.5; .5 MG/3ML; MG/3ML
3 SOLUTION RESPIRATORY (INHALATION)
Status: DISCONTINUED | OUTPATIENT
Start: 2020-01-01 | End: 2020-01-01 | Stop reason: HOSPADM

## 2020-01-01 RX ORDER — AZITHROMYCIN 250 MG/1
250 TABLET, FILM COATED ORAL
Status: DISCONTINUED | OUTPATIENT
Start: 2020-01-01 | End: 2020-01-01

## 2020-01-01 RX ORDER — AZITHROMYCIN 250 MG/1
TABLET, FILM COATED ORAL
Qty: 6 TABLET | Refills: 0 | Status: SHIPPED | OUTPATIENT
Start: 2020-01-01 | End: 2020-01-01 | Stop reason: HOSPADM

## 2020-01-01 RX ORDER — GUAIFENESIN 600 MG/1
600 TABLET, EXTENDED RELEASE ORAL EVERY 12 HOURS SCHEDULED
Status: DISCONTINUED | OUTPATIENT
Start: 2020-01-01 | End: 2020-01-01 | Stop reason: HOSPADM

## 2020-01-01 RX ORDER — ACETAMINOPHEN 325 MG/1
650 TABLET ORAL EVERY 4 HOURS PRN
Status: DISCONTINUED | OUTPATIENT
Start: 2020-01-01 | End: 2020-01-01 | Stop reason: HOSPADM

## 2020-01-01 RX ORDER — HYDROCODONE BITARTRATE AND ACETAMINOPHEN 10; 325 MG/1; MG/1
1 TABLET ORAL EVERY 6 HOURS
Qty: 28 TABLET | Refills: 0 | Status: SHIPPED | OUTPATIENT
Start: 2020-01-01 | End: 2020-01-01

## 2020-01-01 RX ORDER — PREDNISONE 20 MG/1
40 TABLET ORAL DAILY
Qty: 6 TABLET | Refills: 0 | Status: SHIPPED | OUTPATIENT
Start: 2020-01-01

## 2020-01-01 RX ORDER — ALBUTEROL SULFATE 2.5 MG/3ML
2.5 SOLUTION RESPIRATORY (INHALATION)
Status: DISCONTINUED | OUTPATIENT
Start: 2020-01-01 | End: 2020-01-01 | Stop reason: HOSPADM

## 2020-01-01 RX ORDER — HYDROCODONE BITARTRATE AND ACETAMINOPHEN 10; 325 MG/1; MG/1
1 TABLET ORAL EVERY 6 HOURS PRN
Qty: 90 TABLET | Refills: 0 | Status: SHIPPED | OUTPATIENT
Start: 2020-01-01 | End: 2020-01-01 | Stop reason: HOSPADM

## 2020-01-01 RX ORDER — ACETAMINOPHEN 325 MG/1
650 TABLET ORAL EVERY 4 HOURS PRN
Status: DISCONTINUED | OUTPATIENT
Start: 2020-01-01 | End: 2020-01-01

## 2020-01-01 RX ORDER — HYDROCODONE BITARTRATE AND ACETAMINOPHEN 10; 325 MG/1; MG/1
1 TABLET ORAL EVERY 6 HOURS
Status: DISCONTINUED | OUTPATIENT
Start: 2020-01-01 | End: 2020-01-01 | Stop reason: HOSPADM

## 2020-01-01 RX ORDER — PREDNISONE 20 MG/1
40 TABLET ORAL EVERY 12 HOURS SCHEDULED
Status: DISCONTINUED | OUTPATIENT
Start: 2020-01-01 | End: 2020-01-01

## 2020-01-01 RX ORDER — ONDANSETRON 2 MG/ML
4 INJECTION INTRAMUSCULAR; INTRAVENOUS EVERY 6 HOURS PRN
Status: DISCONTINUED | OUTPATIENT
Start: 2020-01-01 | End: 2020-01-01 | Stop reason: HOSPADM

## 2020-01-01 RX ORDER — IPRATROPIUM BROMIDE AND ALBUTEROL SULFATE 2.5; .5 MG/3ML; MG/3ML
3 SOLUTION RESPIRATORY (INHALATION)
Status: DISCONTINUED | OUTPATIENT
Start: 2020-01-01 | End: 2020-01-01

## 2020-01-01 RX ORDER — HYDROCODONE BITARTRATE AND ACETAMINOPHEN 5; 325 MG/1; MG/1
1 TABLET ORAL EVERY 6 HOURS PRN
Qty: 90 TABLET | Refills: 0 | Status: CANCELLED | OUTPATIENT
Start: 2020-01-01

## 2020-01-01 RX ORDER — SODIUM CHLORIDE 0.9 % (FLUSH) 0.9 %
10 SYRINGE (ML) INJECTION AS NEEDED
Status: DISCONTINUED | OUTPATIENT
Start: 2020-01-01 | End: 2020-01-01 | Stop reason: HOSPADM

## 2020-01-01 RX ORDER — HYDROCODONE BITARTRATE AND ACETAMINOPHEN 5; 325 MG/1; MG/1
1 TABLET ORAL EVERY 6 HOURS PRN
Qty: 90 TABLET | Refills: 0 | Status: SHIPPED | OUTPATIENT
Start: 2020-01-01 | End: 2020-01-01 | Stop reason: HOSPADM

## 2020-01-01 RX ORDER — CEFTRIAXONE SODIUM 1 G/50ML
1 INJECTION, SOLUTION INTRAVENOUS EVERY 24 HOURS
Status: DISCONTINUED | OUTPATIENT
Start: 2020-01-01 | End: 2020-01-01 | Stop reason: HOSPADM

## 2020-01-01 RX ORDER — FENTANYL 12 UG/H
1 PATCH TRANSDERMAL
Status: DISCONTINUED | OUTPATIENT
Start: 2020-01-01 | End: 2020-01-01 | Stop reason: HOSPADM

## 2020-01-01 RX ORDER — BUDESONIDE AND FORMOTEROL FUMARATE DIHYDRATE 160; 4.5 UG/1; UG/1
2 AEROSOL RESPIRATORY (INHALATION)
Status: DISCONTINUED | OUTPATIENT
Start: 2020-01-01 | End: 2020-01-01 | Stop reason: HOSPADM

## 2020-01-01 RX ORDER — HYDROCODONE BITARTRATE AND ACETAMINOPHEN 10; 325 MG/1; MG/1
1 TABLET ORAL EVERY 4 HOURS PRN
Status: DISCONTINUED | OUTPATIENT
Start: 2020-01-01 | End: 2020-01-01

## 2020-01-01 RX ADMIN — IPRATROPIUM BROMIDE AND ALBUTEROL SULFATE 3 ML: 2.5; .5 SOLUTION RESPIRATORY (INHALATION) at 23:12

## 2020-01-01 RX ADMIN — GUAIFENESIN 600 MG: 600 TABLET, EXTENDED RELEASE ORAL at 21:51

## 2020-01-01 RX ADMIN — ONDANSETRON 4 MG: 2 INJECTION INTRAMUSCULAR; INTRAVENOUS at 11:14

## 2020-01-01 RX ADMIN — PREDNISONE 40 MG: 20 TABLET ORAL at 08:45

## 2020-01-01 RX ADMIN — TIOTROPIUM BROMIDE INHALATION SPRAY 2 PUFF: 3.12 SPRAY, METERED RESPIRATORY (INHALATION) at 06:57

## 2020-01-01 RX ADMIN — IPRATROPIUM BROMIDE AND ALBUTEROL SULFATE 3 ML: 2.5; .5 SOLUTION RESPIRATORY (INHALATION) at 15:43

## 2020-01-01 RX ADMIN — HYDROCODONE BITARTRATE AND ACETAMINOPHEN 1 TABLET: 10; 325 TABLET ORAL at 21:51

## 2020-01-01 RX ADMIN — APIXABAN 5 MG: 5 TABLET, FILM COATED ORAL at 09:11

## 2020-01-01 RX ADMIN — BUDESONIDE AND FORMOTEROL FUMARATE DIHYDRATE 2 PUFF: 160; 4.5 AEROSOL RESPIRATORY (INHALATION) at 13:38

## 2020-01-01 RX ADMIN — PREDNISONE 40 MG: 20 TABLET ORAL at 09:40

## 2020-01-01 RX ADMIN — ALPRAZOLAM 0.25 MG: 0.25 TABLET ORAL at 01:34

## 2020-01-01 RX ADMIN — Medication 1 CAPSULE: at 09:40

## 2020-01-01 RX ADMIN — ACETAMINOPHEN 650 MG: 325 TABLET, FILM COATED ORAL at 07:39

## 2020-01-01 RX ADMIN — SODIUM CHLORIDE 125 ML/HR: 9 INJECTION, SOLUTION INTRAVENOUS at 15:45

## 2020-01-01 RX ADMIN — PREDNISONE 40 MG: 20 TABLET ORAL at 01:08

## 2020-01-01 RX ADMIN — APIXABAN 5 MG: 5 TABLET, FILM COATED ORAL at 21:48

## 2020-01-01 RX ADMIN — METOPROLOL TARTRATE 25 MG: 25 TABLET ORAL at 21:48

## 2020-01-01 RX ADMIN — SODIUM CHLORIDE 500 ML: 9 INJECTION, SOLUTION INTRAVENOUS at 15:24

## 2020-01-01 RX ADMIN — BUDESONIDE AND FORMOTEROL FUMARATE DIHYDRATE 2 PUFF: 160; 4.5 AEROSOL RESPIRATORY (INHALATION) at 07:45

## 2020-01-01 RX ADMIN — BUDESONIDE AND FORMOTEROL FUMARATE DIHYDRATE 2 PUFF: 160; 4.5 AEROSOL RESPIRATORY (INHALATION) at 06:57

## 2020-01-01 RX ADMIN — ALPRAZOLAM 0.25 MG: 0.25 TABLET ORAL at 11:31

## 2020-01-01 RX ADMIN — BUMETANIDE 2 MG: 2 TABLET ORAL at 09:40

## 2020-01-01 RX ADMIN — BUDESONIDE AND FORMOTEROL FUMARATE DIHYDRATE 2 PUFF: 160; 4.5 AEROSOL RESPIRATORY (INHALATION) at 08:53

## 2020-01-01 RX ADMIN — PREDNISONE 40 MG: 20 TABLET ORAL at 09:12

## 2020-01-01 RX ADMIN — METOPROLOL TARTRATE 25 MG: 25 TABLET ORAL at 21:51

## 2020-01-01 RX ADMIN — METOPROLOL TARTRATE 25 MG: 25 TABLET ORAL at 20:31

## 2020-01-01 RX ADMIN — SODIUM CHLORIDE, PRESERVATIVE FREE 10 ML: 5 INJECTION INTRAVENOUS at 08:10

## 2020-01-01 RX ADMIN — IPRATROPIUM BROMIDE AND ALBUTEROL SULFATE 3 ML: 2.5; .5 SOLUTION RESPIRATORY (INHALATION) at 12:14

## 2020-01-01 RX ADMIN — APIXABAN 5 MG: 5 TABLET, FILM COATED ORAL at 20:31

## 2020-01-01 RX ADMIN — CEFTRIAXONE SODIUM 1 G: 1 INJECTION, SOLUTION INTRAVENOUS at 16:15

## 2020-01-01 RX ADMIN — ALPRAZOLAM 0.25 MG: 0.25 TABLET ORAL at 03:06

## 2020-01-01 RX ADMIN — IPRATROPIUM BROMIDE AND ALBUTEROL SULFATE 3 ML: 2.5; .5 SOLUTION RESPIRATORY (INHALATION) at 11:58

## 2020-01-01 RX ADMIN — PREDNISONE 40 MG: 20 TABLET ORAL at 08:09

## 2020-01-01 RX ADMIN — HYDROCODONE BITARTRATE AND ACETAMINOPHEN 1 TABLET: 10; 325 TABLET ORAL at 22:44

## 2020-01-01 RX ADMIN — ONDANSETRON 4 MG: 2 INJECTION INTRAMUSCULAR; INTRAVENOUS at 23:03

## 2020-01-01 RX ADMIN — IPRATROPIUM BROMIDE AND ALBUTEROL SULFATE 3 ML: 2.5; .5 SOLUTION RESPIRATORY (INHALATION) at 22:58

## 2020-01-01 RX ADMIN — Medication 1 CAPSULE: at 08:09

## 2020-01-01 RX ADMIN — BUDESONIDE AND FORMOTEROL FUMARATE DIHYDRATE 2 PUFF: 160; 4.5 AEROSOL RESPIRATORY (INHALATION) at 19:25

## 2020-01-01 RX ADMIN — CEFTRIAXONE SODIUM 1 G: 1 INJECTION, SOLUTION INTRAVENOUS at 11:20

## 2020-01-01 RX ADMIN — CEFTRIAXONE SODIUM 1 G: 1 INJECTION, SOLUTION INTRAVENOUS at 13:14

## 2020-01-01 RX ADMIN — APIXABAN 5 MG: 5 TABLET, FILM COATED ORAL at 08:09

## 2020-01-01 RX ADMIN — BUMETANIDE 2 MG: 2 TABLET ORAL at 14:55

## 2020-01-01 RX ADMIN — APIXABAN 5 MG: 5 TABLET, FILM COATED ORAL at 21:51

## 2020-01-01 RX ADMIN — HYDROCODONE BITARTRATE AND ACETAMINOPHEN 1 TABLET: 10; 325 TABLET ORAL at 03:37

## 2020-01-01 RX ADMIN — Medication 1 CAPSULE: at 13:15

## 2020-01-01 RX ADMIN — METOPROLOL TARTRATE 25 MG: 25 TABLET ORAL at 08:07

## 2020-01-01 RX ADMIN — BUMETANIDE 2 MG: 2 TABLET ORAL at 09:11

## 2020-01-01 RX ADMIN — FLUDEOXYGLUCOSE F18 1 DOSE: 300 INJECTION INTRAVENOUS at 08:38

## 2020-01-01 RX ADMIN — HYDROCODONE BITARTRATE AND ACETAMINOPHEN 1 TABLET: 10; 325 TABLET ORAL at 04:16

## 2020-01-01 RX ADMIN — GUAIFENESIN 600 MG: 600 TABLET, EXTENDED RELEASE ORAL at 08:07

## 2020-01-01 RX ADMIN — APIXABAN 5 MG: 5 TABLET, FILM COATED ORAL at 01:09

## 2020-01-01 RX ADMIN — METOPROLOL TARTRATE 25 MG: 25 TABLET ORAL at 09:11

## 2020-01-01 RX ADMIN — APIXABAN 5 MG: 5 TABLET, FILM COATED ORAL at 20:41

## 2020-01-01 RX ADMIN — TIOTROPIUM BROMIDE INHALATION SPRAY 2 PUFF: 3.12 SPRAY, METERED RESPIRATORY (INHALATION) at 13:36

## 2020-01-01 RX ADMIN — APIXABAN 5 MG: 5 TABLET, FILM COATED ORAL at 09:40

## 2020-01-01 RX ADMIN — GUAIFENESIN 600 MG: 600 TABLET, EXTENDED RELEASE ORAL at 21:48

## 2020-01-01 RX ADMIN — ACETAMINOPHEN 650 MG: 325 TABLET, FILM COATED ORAL at 01:34

## 2020-01-01 RX ADMIN — PREDNISONE 40 MG: 20 TABLET ORAL at 08:07

## 2020-01-01 RX ADMIN — HYDROCODONE BITARTRATE AND ACETAMINOPHEN 1 TABLET: 10; 325 TABLET ORAL at 10:25

## 2020-01-01 RX ADMIN — GUAIFENESIN 600 MG: 600 TABLET, EXTENDED RELEASE ORAL at 09:11

## 2020-01-01 RX ADMIN — GUAIFENESIN 600 MG: 600 TABLET, EXTENDED RELEASE ORAL at 08:45

## 2020-01-01 RX ADMIN — CEFTRIAXONE SODIUM 1 G: 1 INJECTION, SOLUTION INTRAVENOUS at 12:04

## 2020-01-01 RX ADMIN — HYDROCODONE BITARTRATE AND ACETAMINOPHEN 1 TABLET: 10; 325 TABLET ORAL at 11:21

## 2020-01-01 RX ADMIN — GUAIFENESIN 600 MG: 600 TABLET, EXTENDED RELEASE ORAL at 01:09

## 2020-01-01 RX ADMIN — GUAIFENESIN 600 MG: 600 TABLET, EXTENDED RELEASE ORAL at 20:41

## 2020-01-01 RX ADMIN — ACETAMINOPHEN 650 MG: 325 TABLET, FILM COATED ORAL at 01:08

## 2020-01-01 RX ADMIN — ALPRAZOLAM 0.25 MG: 0.25 TABLET ORAL at 07:39

## 2020-01-01 RX ADMIN — APIXABAN 5 MG: 5 TABLET, FILM COATED ORAL at 08:45

## 2020-01-01 RX ADMIN — AZITHROMYCIN DIHYDRATE 250 MG: 250 TABLET, FILM COATED ORAL at 08:45

## 2020-01-01 RX ADMIN — TIOTROPIUM BROMIDE INHALATION SPRAY 2 PUFF: 3.12 SPRAY, METERED RESPIRATORY (INHALATION) at 08:08

## 2020-01-01 RX ADMIN — METOPROLOL TARTRATE 25 MG: 25 TABLET ORAL at 09:40

## 2020-01-01 RX ADMIN — CEFTRIAXONE SODIUM 1 G: 1 INJECTION, SOLUTION INTRAVENOUS at 11:29

## 2020-01-01 RX ADMIN — METOPROLOL TARTRATE 25 MG: 25 TABLET ORAL at 08:09

## 2020-01-01 RX ADMIN — GUAIFENESIN 600 MG: 600 TABLET, EXTENDED RELEASE ORAL at 20:31

## 2020-01-01 RX ADMIN — ACETAMINOPHEN 650 MG: 325 TABLET, FILM COATED ORAL at 05:52

## 2020-01-01 RX ADMIN — SODIUM CHLORIDE, PRESERVATIVE FREE 10 ML: 5 INJECTION INTRAVENOUS at 08:48

## 2020-01-01 RX ADMIN — BUDESONIDE AND FORMOTEROL FUMARATE DIHYDRATE 2 PUFF: 160; 4.5 AEROSOL RESPIRATORY (INHALATION) at 19:38

## 2020-01-01 RX ADMIN — HYDROCODONE BITARTRATE AND ACETAMINOPHEN 1 TABLET: 10; 325 TABLET ORAL at 10:00

## 2020-01-01 RX ADMIN — GUAIFENESIN 600 MG: 600 TABLET, EXTENDED RELEASE ORAL at 08:09

## 2020-01-01 RX ADMIN — HYDROCODONE BITARTRATE AND ACETAMINOPHEN 1 TABLET: 10; 325 TABLET ORAL at 22:08

## 2020-01-01 RX ADMIN — HYDROCODONE BITARTRATE AND ACETAMINOPHEN 1 TABLET: 10; 325 TABLET ORAL at 17:22

## 2020-01-01 RX ADMIN — BUDESONIDE AND FORMOTEROL FUMARATE DIHYDRATE 2 PUFF: 160; 4.5 AEROSOL RESPIRATORY (INHALATION) at 19:00

## 2020-01-01 RX ADMIN — HYDROCODONE BITARTRATE AND ACETAMINOPHEN 1 TABLET: 10; 325 TABLET ORAL at 16:33

## 2020-01-01 RX ADMIN — TIOTROPIUM BROMIDE INHALATION SPRAY 2 PUFF: 3.12 SPRAY, METERED RESPIRATORY (INHALATION) at 07:43

## 2020-01-01 RX ADMIN — BUDESONIDE AND FORMOTEROL FUMARATE DIHYDRATE 2 PUFF: 160; 4.5 AEROSOL RESPIRATORY (INHALATION) at 19:16

## 2020-01-01 RX ADMIN — HYDROCODONE BITARTRATE AND ACETAMINOPHEN 1 TABLET: 10; 325 TABLET ORAL at 20:49

## 2020-01-01 RX ADMIN — GUAIFENESIN 600 MG: 600 TABLET, EXTENDED RELEASE ORAL at 09:40

## 2020-01-01 RX ADMIN — Medication 1 CAPSULE: at 09:12

## 2020-01-01 RX ADMIN — SODIUM CHLORIDE, PRESERVATIVE FREE 10 ML: 5 INJECTION INTRAVENOUS at 11:30

## 2020-01-01 RX ADMIN — HYDROCODONE BITARTRATE AND ACETAMINOPHEN 1 TABLET: 10; 325 TABLET ORAL at 11:31

## 2020-01-01 RX ADMIN — HYDROCODONE BITARTRATE AND ACETAMINOPHEN 1 TABLET: 10; 325 TABLET ORAL at 04:05

## 2020-01-01 RX ADMIN — DENOSUMAB 120 MG: 120 INJECTION SUBCUTANEOUS at 13:28

## 2020-01-01 RX ADMIN — APIXABAN 5 MG: 5 TABLET, FILM COATED ORAL at 08:07

## 2020-01-01 RX ADMIN — ALPRAZOLAM 0.25 MG: 0.25 TABLET ORAL at 20:31

## 2020-01-01 RX ADMIN — HYDROCODONE BITARTRATE AND ACETAMINOPHEN 1 TABLET: 10; 325 TABLET ORAL at 04:32

## 2020-01-01 RX ADMIN — ENOXAPARIN SODIUM 30 MG: 30 INJECTION SUBCUTANEOUS at 20:51

## 2020-01-01 RX ADMIN — METOPROLOL TARTRATE 25 MG: 25 TABLET ORAL at 20:46

## 2020-01-01 RX ADMIN — ACETAMINOPHEN 650 MG: 325 TABLET, FILM COATED ORAL at 20:31

## 2020-01-01 RX ADMIN — BUDESONIDE AND FORMOTEROL FUMARATE DIHYDRATE 2 PUFF: 160; 4.5 AEROSOL RESPIRATORY (INHALATION) at 08:07

## 2020-01-01 RX ADMIN — HYDROCODONE BITARTRATE AND ACETAMINOPHEN 1 TABLET: 10; 325 TABLET ORAL at 11:09

## 2020-01-01 RX ADMIN — DENOSUMAB 120 MG: 120 INJECTION SUBCUTANEOUS at 13:06

## 2020-01-01 RX ADMIN — TIOTROPIUM BROMIDE INHALATION SPRAY 2 PUFF: 3.12 SPRAY, METERED RESPIRATORY (INHALATION) at 08:55

## 2020-01-01 RX ADMIN — FENTANYL 1 PATCH: 12 PATCH, EXTENDED RELEASE TRANSDERMAL at 14:04

## 2020-01-01 RX ADMIN — HYDROCODONE BITARTRATE AND ACETAMINOPHEN 1 TABLET: 10; 325 TABLET ORAL at 16:34

## 2020-01-01 RX ADMIN — ALBUTEROL SULFATE 2.5 MG: 2.5 SOLUTION RESPIRATORY (INHALATION) at 10:36

## 2020-01-03 NOTE — PROGRESS NOTES
Subjective .     REASONS FOR FOLLOWUP:    1. History of stage I left breast cancer in 1998, ER/AZ positive, status post mastectomy followed by tamoxifen x 5 years, completed in 07/2003.    2. Limited stage small cell lung cancer with right lower lobe mass, right hilar adenopathy.  Initiation of cisplatin/-16 chemotherapy, cycle 1 initiated 11/17/10.  Concurrent radiation therapy initiated 11/19/10.  Radiation therapy completed 01/11/11.   3. Status post Mediport placement on 12/27/10.  Mediport removed in November 2011.    4. Cycle #6 of cisplatin/-16 chemotherapy initiated 3/1/11.  Complete response seen on subsequent CT scan dated 3/28/11.       5. 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   6. Status post PCI, completed 5-27-11.     7. Right thyroid nodule  8. Cancer related pain receiving hydrocodone 10/325  9. Metastatic non-small cell lung cancer:  · PET scan 7/11/2019 showing enlarging hypermetabolic left upper lobe/perihilar mass 2.8 cm (SUV 19) with additional 6 mm subpleural left upper lobe lesion (SUV 6.2), right medial 10th rib/costal vertebral activity (SUV 4.5), right iliac 3.5 cm lesion (SUV 11.8), left iliac activity (SUV 5.3).  Activity in distal rectum and collapsed colon of unclear significance.   · CT head (unable to obtain MRI) negative for metastatic disease on 7/24/2019  · Bronchoscopy/EBUS 7/22/2019 unable to reach perihilar mass.  Left upper lobe brushings/washings negative for malignancy.    · CT-guided biopsy left upper lobe mass 8/1/2019 negative for malignancy.    · CT-guided biopsy right iliac lesion on 8/14/19 with poorly differentiated carcinoma. PDL1 15%, insufficient material for molecular testing.  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  Will need to discuss possible referral to genetics clinic to help determine  if REEMA mutation is germline for benefit of other family members.  · Initiated palliative radiation to right iliac lesion 8/15/2019, completed on 8/29/2019  · Patient is not a candidate for chemotherapy due to comorbidities.  Discussed single agent palliative immunotherapy with Keytruda versus palliative/supportive care with hospice involvement.  Patient elected to pursue Keytruda, initiated treatment on 8/30/2019.  · Addition of Xgeva 9/28/2019 with plans to administer every 6 weeks to coincide with Keytruda schedule.  · PET scan following 3 cycles Keytruda 10/28/2019 with slight increase left upper lobe mass, slight increase left upper lobe pleural lesion, too small left upper lobe nodules, improvement right iliac lesion (was radiated), new small area of activity right acetabulum (no corresponding CT lesion).  Given length of time between previous PET scan and initiation of Keytruda and possibility of some areas representing pseudo-progression along with lack of alternative therapies elected to continue on Keytruda.  · PET scan 12/27/2019 following 6 cycles Keytruda with clear progression of disease in left lung and pleural space, Keytruda discontinued 1/3/2020.  · Patient felt to be poor candidate again for chemotherapy, discussion regarding potential use of PARP inhibitor due to REEMA mutation.  10. Immunotherapy induced skin rash:  · Developed at end of cycle 3 Keytruda involving forearms and chest, mild, grade 1  · Topical hydrocortisone 2.5% twice daily    HISTORY OF PRESENT ILLNESS:  The patient is a 74 y.o. year old female who is here for follow-up with the above-mentioned history.    History of Present Illness patient returns today in follow-up following 6 cycles Keytruda with laboratory studies and PET scan to review.  In the interval, she reports that she has done relatively well.  Her skin rash has been stable involving chest as well as upper and proximal lower extremities with mild pruritus.  She is using  hydrocortisone 2.5% topical cream intermittently with some relief.  Skin rash overall is stable.  She reports one episode of nausea this past week but overall has had a fairly stable appetite.  She notes minimal shortness of breath/dyspnea on exertion.  She reports stable low back pain requiring approximately 2 doses of hydrocodone 5/325/day.  She is managing her constipation well with Senokot 2 tablets twice daily.  She remains in a wheelchair today, reports that she does not go back to bed during the day but does spend more than half of her day resting and sleeping in the recliner.    PAST MEDICAL HISTORY:   1. Chronic obstructive pulmonary disease.    2. Right low thoracic dermatome shingles in 02/12.    3. Cystoscopy with bladder biopsy in 05/2012.    4. Psoriasis identified in the right heel in 2013.    5. Osteoarthritis.  6. Colonoscopy 8/4/15 with 5 mm benign rectal polyp.  7. Right sided thyroid nodule.  8. Atrial fibrillation on anticoagulation with Eliquis 5 mg twice a day.  Pacemaker placed 3/13/17.    SURGICAL HISTORY:    1. Left mastectomy in 1998, left breast reconstruction and right breast implant placement in 1999.    2. Exploratory laparotomy.   3. Status post pacemaker placement left chest wall 3/13/17.     ONCOLOGIC HISTORY:  (History from previous dates can be found in the separate document.)  Past Medical History:   Diagnosis Date   • Aortic valve insufficiency    • Atrial fibrillation and flutter (CMS/HCC)    • Axillary lump    • Breast cancer (CMS/HCC)     Left, stage I; ER/MS positive   • Chronic kidney disease     Stage 3   • COPD (chronic obstructive pulmonary disease) (CMS/HCC)    • Dizziness     upon standing   • Fatigue    • Hypertension    • Lung cancer (CMS/HCC)    • Neutropenia (CMS/HCC)    • Osteoarthritis    • Paroxysmal atrial fibrillation (CMS/HCC)    • Peripheral neuropathy     Secondary to cisplatin.   • Pneumonia 06/2019   • Psoriasis     Identified in the right heel in 2013.    • Rectal polyp    • Shingles 02/2012    RIGHT LOW THORACIC    • Small cell lung cancer (CMS/HCC)     Limited stage   • Thyroid nodule     RIGHT   • Wheezing      Past Surgical History:   Procedure Laterality Date   • BREAST IMPLANT SURGERY Right 1999   • BREAST RECONSTRUCTION Left 1999   • BRONCHOSCOPY N/A 7/22/2019    Procedure: BRONCHOSCOPY WITH ENDOBRONCHIAL ULTRASOUND WITH BAL AND BRUSHINGS;  Surgeon: Johnathan Elliott MD;  Location: [facility] ENDOSCOPY;  Service: Pulmonary   • CARDIAC ELECTROPHYSIOLOGY PROCEDURE N/A 3/13/2017    Procedure: Device Implant  DDD pacer  medtronic;  Surgeon: Armani Kennedy MD;  Location: [facility] CATH INVASIVE LOCATION;  Service:    • CATARACT EXTRACTION Bilateral    • COLONOSCOPY     • CYSTOSCOPY BLADDER BIOPSY  05/2012   • EXPLORATORY LAPAROTOMY      Several years ago.   • MASTECTOMY Left 1998   • MEDIPORT INSERTION, SINGLE     • MEDIPORT REMOVAL Right    • PACEMAKER IMPLANTATION           Current Outpatient Medications on File Prior to Visit   Medication Sig Dispense Refill   • bumetanide (BUMEX) 2 MG tablet Take 2 mg by mouth Daily.     • HYDROcodone-acetaminophen (NORCO) 5-325 MG per tablet Take 1 tablet by mouth Every 6 (Six) Hours As Needed for Moderate Pain . 90 tablet 0   • ipratropium-albuterol (DUO-NEB) 0.5-2.5 mg/3 ml nebulizer VVN Q 4 H PRN  6   • metoprolol tartrate (LOPRESSOR) 25 MG tablet Take 37.5 mg by mouth 2 (Two) Times a Day.     • omeprazole (priLOSEC) 40 MG capsule Take 40 mg by mouth Daily.     • polyethylene glycol (MIRALAX) packet Take 17 g by mouth 2 (Two) Times a Day.     • potassium chloride (K-DUR,KLOR-CON) 20 MEQ CR tablet Take 20 mEq by mouth Daily.     • predniSONE (DELTASONE) 20 MG tablet      • senna (SENOKOT) 8.6 MG tablet Take 1 tablet by mouth Daily.     • [DISCONTINUED] ondansetron (ZOFRAN) 8 MG tablet Take 1 tablet by mouth 3 (Three) Times a Day As Needed for Nausea or Vomiting. 30 tablet 5   • apixaban (ELIQUIS) 5 MG tablet tablet Take  5 mg by mouth Every 12 (Twelve) Hours.     • hydrocortisone 2.5 % cream Apply  topically to the appropriate area as directed 2 (Two) Times a Day. 20 g 2   • [DISCONTINUED] ELIQUIS 5 MG tablet tablet TAKE 1 TABLET BY MOUTH TWICE DAILY 180 tablet 2     No current facility-administered medications on file prior to visit.        ALLERGIES:     Allergies   Allergen Reactions   • Moxifloxacin Nausea Only     Passed out after taking       SOCIAL HISTORY:  .  Drinks approximately three beers every other day.  Long-standing smoking history. No HIV risk factors.  Social History     Socioeconomic History   • Marital status:      Spouse name: Dirk   • Number of children: Not on file   • Years of education: Not on file   • Highest education level: Not on file   Occupational History     Employer: RETIRED   Tobacco Use   • Smoking status: Former Smoker     Start date: 2011   • Smokeless tobacco: Never Used   • Tobacco comment: quit 6 years ago    //    caffeine use - one soft drink daily    Substance and Sexual Activity   • Alcohol use: No     Comment: Occasional beer, approximately 3 beers every other day.   • Drug use: No   • Sexual activity: Defer         FAMILY HISTORY:  Positive for melanoma in her mother, diagnosed at the age of 70 and  from heart attack at age of 80.  Father had colon cancer at age 70 and  at age of 80 of abdominal carcinomatosis.    Family History   Problem Relation Age of Onset   • Heart disease Mother    • Heart attack Mother    • Melanoma Mother 70   • Colon cancer Father 70   • Cancer Father         Abdominal carcinomatosis   • Stroke Neg Hx        Review of Systems   Constitutional: Positive for fatigue. Negative for activity change, appetite change and unexpected weight change.   HENT: Negative for mouth sores, nosebleeds and voice change.    Eyes: Negative for itching.   Respiratory: Positive for shortness of breath. Negative for cough and wheezing.    Cardiovascular:  Negative for palpitations and leg swelling.   Gastrointestinal: Positive for nausea. Negative for abdominal distention, blood in stool, constipation and diarrhea.   Endocrine: Negative for cold intolerance and heat intolerance.   Genitourinary: Negative for difficulty urinating, dysuria, frequency and hematuria.   Musculoskeletal: Positive for arthralgias. Negative for back pain, gait problem and neck pain.   Skin: Positive for rash.   Neurological: Negative for dizziness, syncope, weakness, light-headedness and numbness.   Hematological: Negative for adenopathy. Does not bruise/bleed easily.   Psychiatric/Behavioral: Negative for confusion and sleep disturbance. The patient is not nervous/anxious.          Objective      Vitals:    01/03/20 0926   BP: 96/62   Pulse: 71   Resp: 16   Temp: 97.6 °F (36.4 °C)   SpO2: 95%      Current Status 1/3/2020   ECOG score 1   Pain 0/10    Physical Exam   Constitutional: She is oriented to person, place, and time. She appears well-developed and well-nourished.   Elderly female no distress seated in a wheelchair   HENT:   Mouth/Throat: Oropharynx is clear and moist.   Eyes: Conjunctivae are normal.   Neck: No thyromegaly present.   Cardiovascular: Normal rate and regular rhythm. Exam reveals no gallop and no friction rub.   No murmur heard.  Pulmonary/Chest: Breath sounds normal. No respiratory distress. She has no wheezes. Right breast exhibits no mass. Left breast exhibits no mass.   Abdominal: Soft. Bowel sounds are normal. She exhibits no distension. There is no tenderness.   Musculoskeletal: She exhibits no edema.   Lymphadenopathy:        Head (right side): No submandibular adenopathy present.     She has no cervical adenopathy.     She has no axillary adenopathy.        Right: No inguinal and no supraclavicular adenopathy present.        Left: No inguinal and no supraclavicular adenopathy present.   Neurological: She is alert and oriented to person, place, and time. She  displays normal reflexes. No cranial nerve deficit. She exhibits normal muscle tone.   Skin: Skin is warm and dry. Rash noted.   Faint skin rash involving bilateral upper extremities and to a lesser extent upper chest with small erythematous macular areas 0.5 cm or less.  Similar to previous exam.   Psychiatric: She has a normal mood and affect. Her behavior is normal.       RECENT LABS:  Hematology WBC   Date Value Ref Range Status   01/03/2020 9.09 3.40 - 10.80 10*3/mm3 Final     RBC   Date Value Ref Range Status   01/03/2020 3.60 (L) 3.77 - 5.28 10*6/mm3 Final     Hemoglobin   Date Value Ref Range Status   01/03/2020 10.0 (L) 12.0 - 15.9 g/dL Final     Hematocrit   Date Value Ref Range Status   01/03/2020 33.8 (L) 34.0 - 46.6 % Final     Platelets   Date Value Ref Range Status   01/03/2020 358 140 - 450 10*3/mm3 Final        Lab Results   Component Value Date    GLUCOSE 158 (H) 01/03/2020    BUN 20 01/03/2020    CREATININE 1.17 (H) 01/03/2020    EGFRIFNONA 45 (L) 01/03/2020    BCR 17.1 01/03/2020    CO2 26.5 01/03/2020    CALCIUM 9.0 01/03/2020    ALBUMIN 3.90 01/03/2020    AST 11 01/03/2020    ALT 5 01/03/2020     PET scan 12/27/2019: Results as summarized above and below.  I did personally review PET scan images today and reviewed images in the exam room with the patient and her family.      Assessment/Plan      1. Limited stage small cell lung cancer:   · The patient completed concurrent chemoradiation with 6 cycles of cisplatin and -16 in March 2011 and achieved a complete response.   · She did receive PCI completing this in May 2011.    · The patient showed no evidence of recurrent disease.  2. Metastatic non-small cell lung cancer:  · Annual low-dose screening CT scan of the chest.  Follow-up study from 6/6/18 showed new findings with small to moderate right pleural effusion as well as right pleural thickening and potential lymphadenopathy along the right lower lobe bronchovascular bundle although  difficult to identify with certainty.    · Suspicion for potential underlying malignancy. PET scan 6/15/18 showed no significant change in the right pleural effusion with low level activity in the right lower lobe bronchovascular bundle SUV of only 3.7.  There was therefore no definitive suspicious hypermetabolic activity to explain the pleural effusion.    · Diagnostic right thoracentesis on 6/20/18 with 250 cc clear yellow fluid removed with LDH 90, pH 7.5, total protein 1.8, appearing transudative with pathology showing no evidence of malignant cells. Etiology of the findings and pleural effusion unclear.  Patient referred back to cardiology and was started by PCP on Lasix 40 mg daily in July 2018.    · CT chest 7/13/18 with no change and negative/low probability V/Q scan 7/16/18.  · Repeat CT 8/17/18 showed near complete resolution of pleural effusions with only a minimal right pleural effusion remaining and some atelectasis/scarring in the right lower lobe.   · Follow-up CT chest 11/30/2018 with probable scar right lower lobe.  · Patient was hospitalized 5/22-5/27/2019 with suspected viral and bacterial pneumonia.  She did have evidence of metapneumovirus on respiratory viral panel.  CT chest 5/23/2019 showed increased consolidation right base consistent with infiltrate.  There was evidence of scattered groundglass opacity (likely the viral component).  There was an enlarging nodule however with irregular margins left upper lobe associated with left hilum worrisome for malignancy.   · PET scan 7/11/2019 showed enlarging hypermetabolic left upper lobe/perihilar mass 2.8 cm (SUV 19) with additional 6 mm subpleural left upper lobe lesion (SUV 6.2), right medial 10th rib/costal vertebral activity (SUV 4.5), right iliac 3.5 cm lesion (SUV 11.8), left iliac activity (SUV 5.3).  Activity in distal rectum and collapsed colon of unclear significance.   · CT head (unable to obtain MRI) negative for metastatic disease on  7/24/2019  · Bronchoscopy/EBUS 7/22/2019 unable to reach perihilar mass.  Left upper lobe brushings/washings negative for malignancy.    · CT-guided biopsy left upper lobe mass 8/1/2019- for malignancy.    · CT-guided biopsy right iliac lesion on 8/14/19 with poorly differentiated carcinoma. PDL1 15%, insufficient material for molecular testing.  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  Will need to discuss possible referral to genetics clinic to help determine if REEMA mutation is germline for benefit of other family members.  · Initiated palliative radiation to right iliac lesion 8/15/2019, completed on 8/29/2019  · Patient is not a candidate for chemotherapy due to comorbidities.  Discussed single agent palliative immunotherapy with Keytruda versus palliative/supportive care with hospice involvement.  Patient elected to pursue Keytruda, initiated treatment 8/30/2019.  · Added Xgeva 9/20/2019, plan to treat every 6 weeks to coincide with Keytruda schedule.  · PET scan following 3 cycles Keytruda 10/28/2019 with slight increase left upper lobe mass, slight increase left upper lobe pleural lesion, too small left upper lobe nodules, improvement right iliac lesion (was radiated), new small area of activity right acetabulum (no corresponding CT lesion).  Given length of time between previous PET scan and initiation of Keytruda and possibility of some areas representing pseudo-progression along with lack of alternative therapies elected to continue on Keytruda.  · Patient returns today in follow-up with PET scan to review from 12/27/2019 following 6 cycles Keytruda and continuing on Xgeva every 6 weeks (to coincide with Keytruda schedule) last administered on 12/13/2019.  The patient has tolerated immunotherapy reasonably well though does continue with immunotherapy related skin rash that is  controlled on topical hydrocortisone 2.5% twice daily as needed.  Her skin rash is currently stable involving the chest and extremities.  We reviewed her PET scan today which unfortunately shows unequivocal evidence of progressive disease with increase in left hilar mass and increase in left pleural-based disease.  The pleural-based mass in the left upper lobe is increased and now abuts T5, fortunately the patient is asymptomatic from this.  Her bony disease remains stable with sclerotic appearance.  With the evidence of progressive disease I have recommended discontinuation of immunotherapy with Keytruda.  I had a lengthy discussion with patient and family members today regarding further treatment options.  She continues with compromised ECOG performance status 2-3 spending over 50% of her day sleeping in her recliner.  She remains in a wheelchair today.  Overall her performance status is unchanged however there remain concerns regarding her ability to tolerate chemotherapy.  We discussed our goal of maintaining adequate quality of life.  We discussed possibility of palliative/supportive care alone with hospice involvement.  We also discussed the potential use of targeted therapy with a PARP inhibitor such as olaparib given her REEMA mutation.  There is little data regarding effectiveness of PARP inhibitors in the setting of REEMA mutations, particularly in lung cancer however there is certainly less potential for toxicity.  I will contact Dr. Forde at [facility] to discuss the issue further and we will inquire about potentially obtaining olaparib in regards to insurance approval.  I would recommend dosing at 150 mg twice daily given the patient's performance status and renal function.  We will notify the patient of developments in the interval in regards to availability of the drug and Dr. Joseph recommendations.  For now she will remain off treatment as she has plans to travel to [place] next week and will be returning  3 weeks which time we will further discuss treatment option with olaparib versus palliative/supportive care alone.  We also discussed the patient's REEMA mutation in regards to whether this represents a germline mutation or a somatic mutation and I will refer the patient to the genetics clinic to further discuss this issue.  This has potential impact for family members.  3. Cancer related pain:   · Previous significant pain due to metastatic lesion in the right iliac bone  · Completed palliative radiation right iliac lesion 8/29/2019  · Improvement in right iliac pain following radiation.  · Patient continues with hydrocodone 5/325 as needed, typically requiring approximately 2 doses per day.  4. Narcotic induced constipation:  · Currently controlled with Senokot 2 tablets twice daily  5. History of stage I breast cancer on the left:   · The patient underwent a left mastectomy and completed 5 years of tamoxifen in July 2003.    · Exam on 6/8/2019 was negative.    · Annual right mammogram 9/5/18 was negative, BI-RADS 1.    · We are not pursuing further routine mammography due to patient's metastatic lung cancer.  6. 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 This does affect her balance; it is unchanged.   7. Stage III chronic kidney disease:   · Baseline creatinine in the 1.1-1.2 range.  · Creatinine today 1.17  8. Atrial fibrillation:   · Pacemaker placement 3/13/17 after having a syncopal episode.  · Patient continuing on Eliquis 5 mg twice daily.   9. Mild dementia:   · Short-term memory is very poor.  This has been an ongoing issue for her.  Her scans do show significant progression of small vessel ischemic change.  It is felt that some of this has been precipitated by her previous PCI.  10. Sigmoid colon uptake on PET scan:   · PET scan 6/15/2018 did show activity in the distal sigmoid colon/rectum and the patient was referred back  to her gastroenterologist Dr. Colton Watkins.    · He did perform a flexible sigmoidoscopy which was negative on 8/2/18 and likely the activity represents muscular activity in the bowel wall.    · Subsequent PET scan 7/11/2019 showed continued activity in the distal rectum SUV 15.9.  There was also activity throughout the colon, possibly suggestive of inflammation/colitis.    · Patient relatively asymptomatic from GI standpoint.    · Patient was not felt to require any additional endoscopic evaluation at this point.  · PET scan 10/28/2019 and also 12/27/2019 with unchanged activity in the distal rectum.  11. Recurrent epistaxis:  · Patient is continuing on Eliquis for atrial fibrillation, 5 mg twice daily  · Patient developed recurrent epistaxis unclear whether unilateral or bilateral.  · Patient referred to Dr. Dos Santos in ENT, refused cauterization  · Symptoms currently resolved  12. Immunotherapy induced skin rash:  · Mild grade 1 immunotherapy induced skin rash developed with macular erythematous lesions forearms and upper chest at end of cycle 3 Keytruda  · Prescribed topical hydrocortisone cream 2.5% twice daily on 11/1/2019.    · Skin rash stable today.      PLAN:     1. Discontinue Keytruda due to progressive disease  2. I will contact Dr. Forde at [facility] to discuss potential efficacy of PARP inhibitor olaparib in the setting of the patient's REEMA mutation  3. We will inquire in regards to coverage of olaparib 150 mg twice daily with the patient's insurance.  This may require additional support from [employer].  4. Referral to genetics clinic to discuss whether patient has germline REEMA mutation versus somatic mutation  5. MD visit in 3 weeks with CBC, CMP, magnesium, phosphorus.  Patient will be due for Xgeva and we will further discuss treatment with olaparib versus palliative/supportive care alone.    I did spend 45 minutes with the patient today, 40 minutes in face-to-face consultation and  coordination of care reviewing issues outlined in the assessment and plan above.

## 2020-01-06 NOTE — PROGRESS NOTES
Staff message rec from Dr Almanzar asking to investigate cost of Lynparza for pt. See below.    Cornelio Almanzar Jr., MD sent to [facility],   Can you investigate coverage of olaparib 150mg bid as outlined in my note? Patient has metastatic lung cancer with REEMA mutation potentially conferring sensitivity to the drug. May need assistance from [facility] to support this.   Thanks!      Pts insurance will deny the PA request as this is not an FDA approved indication. We can submit application to [employer] and ask for free drug. They will require denials from the insurance and income documentation. I have submitted a PA to [facility].    I have notified Dr Almanzar of the above.

## 2020-01-07 NOTE — PROGRESS NOTES
Lynparza approved until 12/31/2020-[facility]    Dr Almanzar notified and he is waiting for an answer from [place]. I will follow up with him on Friday.

## 2020-01-22 NOTE — TELEPHONE ENCOUNTER
I had the opportunity to review the med list and/or validate the medication prescribed by Dr Almanzar   Medication: Hydrocodone  Dose: 5-325    How does the patient take the medication? Oral   How often does the patient take the medication?   Pharmacy Type (local, mail order, specialty): local  Pharmacy Name: [facility]  Pharmacy Phone number or location (if available): [address] [facility]  Supply Amount (e.g, 30 or 90 days): 30 day

## 2020-01-24 NOTE — PROGRESS NOTES
Subjective .     REASONS FOR FOLLOWUP:    1. History of stage I left breast cancer in 1998, ER/SC positive, status post mastectomy followed by tamoxifen x 5 years, completed in 07/2003.    2. Limited stage small cell lung cancer with right lower lobe mass, right hilar adenopathy.  Initiation of cisplatin/-16 chemotherapy, cycle 1 initiated 11/17/10.  Concurrent radiation therapy initiated 11/19/10.  Radiation therapy completed 01/11/11.   3. Status post Mediport placement on 12/27/10.  Mediport removed in November 2011.    4. Cycle #6 of cisplatin/-16 chemotherapy initiated 3/1/11.  Complete response seen on subsequent CT scan dated 3/28/11.       5. 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   6. Status post PCI, completed 5-27-11.     7. Right thyroid nodule  8. Cancer related pain receiving hydrocodone 10/325  9. Metastatic non-small cell lung cancer:  · PET scan 7/11/2019 showing enlarging hypermetabolic left upper lobe/perihilar mass 2.8 cm (SUV 19) with additional 6 mm subpleural left upper lobe lesion (SUV 6.2), right medial 10th rib/costal vertebral activity (SUV 4.5), right iliac 3.5 cm lesion (SUV 11.8), left iliac activity (SUV 5.3).  Activity in distal rectum and collapsed colon of unclear significance.   · CT head (unable to obtain MRI) negative for metastatic disease on 7/24/2019  · Bronchoscopy/EBUS 7/22/2019 unable to reach perihilar mass.  Left upper lobe brushings/washings negative for malignancy.    · CT-guided biopsy left upper lobe mass 8/1/2019 negative for malignancy.    · CT-guided biopsy right iliac lesion on 8/14/19 with poorly differentiated carcinoma. PDL1 15%, insufficient material for molecular testing.  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  Will need to discuss possible referral to genetics clinic to help determine  if REEMA mutation is germline for benefit of other family members.  · Initiated palliative radiation to right iliac lesion 8/15/2019, completed on 8/29/2019  · Patient is not a candidate for chemotherapy due to comorbidities.  Discussed single agent palliative immunotherapy with Keytruda versus palliative/supportive care with hospice involvement.  Patient elected to pursue Keytruda, initiated treatment on 8/30/2019.  · Addition of Xgeva 9/28/2019 with plans to administer every 6 weeks to coincide with Keytruda schedule.  · PET scan following 3 cycles Keytruda 10/28/2019 with slight increase left upper lobe mass, slight increase left upper lobe pleural lesion, too small left upper lobe nodules, improvement right iliac lesion (was radiated), new small area of activity right acetabulum (no corresponding CT lesion).  Given length of time between previous PET scan and initiation of Keytruda and possibility of some areas representing pseudo-progression along with lack of alternative therapies elected to continue on Keytruda.  · PET scan 12/27/2019 following 6 cycles Keytruda with clear progression of disease in left lung and pleural space, Keytruda discontinued 1/3/2020.  · Patient felt to be poor candidate again for chemotherapy, discussion regarding potential use of PARP inhibitor due to REEMA mutation.  · Approval for olaparib obtained, plan to initiate treatment 1/31/2020 at reduced dose of 150 mg twice daily due to renal insufficiency and compromised performance status.  10. Immunotherapy induced skin rash:  · Developed at end of cycle 3 Keytruda involving forearms and chest, mild, grade 1  · Topical hydrocortisone 2.5% twice daily    HISTORY OF PRESENT ILLNESS:  The patient is a 74 y.o. year old female who is here for follow-up with the above-mentioned history.    History of Present Illness the patient returns today and follow-up with laboratory studies to review, due for monthly Xgeva and to discuss potential use of  PARP inhibitor.  Since her last visit, she has been residing in [place].  She has done quite well actually reporting improved level of activity, ECOG performance status of 2.  She has been ambulating more with the use of a walker.  She was able to get in the pool.  She reports stable pain, requiring hydrocodone 5/325 approximately 2 tablets/day.  She is maintaining adequate bowel function with Senokot approximately 2 tablets daily.  Her immunotherapy induced skin rash is stable to improved involving her forearms and proximal lower extremities.  She uses topical hydrocortisone intermittently.  Despite her appetite, weight is decreased slightly today.  Mild chronic shortness of breath.    PAST MEDICAL HISTORY:   1. Chronic obstructive pulmonary disease.    2. Right low thoracic dermatome shingles in 02/12.    3. Cystoscopy with bladder biopsy in 05/2012.    4. Psoriasis identified in the right heel in 2013.    5. Osteoarthritis.  6. Colonoscopy 8/4/15 with 5 mm benign rectal polyp.  7. Right sided thyroid nodule.  8. Atrial fibrillation on anticoagulation with Eliquis 5 mg twice a day.  Pacemaker placed 3/13/17.    SURGICAL HISTORY:    1. Left mastectomy in 1998, left breast reconstruction and right breast implant placement in 1999.    2. Exploratory laparotomy.   3. Status post pacemaker placement left chest wall 3/13/17.     ONCOLOGIC HISTORY:  (History from previous dates can be found in the separate document.)  Past Medical History:   Diagnosis Date   • Aortic valve insufficiency    • Atrial fibrillation and flutter (CMS/HCC)    • Axillary lump    • Breast cancer (CMS/HCC)     Left, stage I; ER/OH positive   • Chronic kidney disease     Stage 3   • COPD (chronic obstructive pulmonary disease) (CMS/HCC)    • Dizziness     upon standing   • Fatigue    • Hypertension    • Lung cancer (CMS/HCC)    • Neutropenia (CMS/HCC)    • Osteoarthritis    • Paroxysmal atrial fibrillation (CMS/HCC)    • Peripheral neuropathy      Secondary to cisplatin.   • Pneumonia 06/2019   • Psoriasis     Identified in the right heel in 2013.   • Rectal polyp    • Shingles 02/2012    RIGHT LOW THORACIC    • Small cell lung cancer (CMS/HCC)     Limited stage   • Thyroid nodule     RIGHT   • Wheezing      Past Surgical History:   Procedure Laterality Date   • BREAST IMPLANT SURGERY Right 1999   • BREAST RECONSTRUCTION Left 1999   • BRONCHOSCOPY N/A 7/22/2019    Procedure: BRONCHOSCOPY WITH ENDOBRONCHIAL ULTRASOUND WITH BAL AND BRUSHINGS;  Surgeon: Johnathan Elliott MD;  Location: [facility] ENDOSCOPY;  Service: Pulmonary   • CARDIAC ELECTROPHYSIOLOGY PROCEDURE N/A 3/13/2017    Procedure: Device Implant  DDD pacer  medtronic;  Surgeon: Armani Kennedy MD;  Location: [facility] CATH INVASIVE LOCATION;  Service:    • CATARACT EXTRACTION Bilateral    • COLONOSCOPY     • CYSTOSCOPY BLADDER BIOPSY  05/2012   • EXPLORATORY LAPAROTOMY      Several years ago.   • MASTECTOMY Left 1998   • MEDIPORT INSERTION, SINGLE     • MEDIPORT REMOVAL Right    • PACEMAKER IMPLANTATION           Current Outpatient Medications on File Prior to Visit   Medication Sig Dispense Refill   • apixaban (ELIQUIS) 5 MG tablet tablet Take 5 mg by mouth Every 12 (Twelve) Hours.     • bumetanide (BUMEX) 2 MG tablet Take 2 mg by mouth Daily.     • HYDROcodone-acetaminophen (NORCO) 5-325 MG per tablet Take 1 tablet by mouth Every 6 (Six) Hours As Needed for Moderate Pain . 90 tablet 0   • hydrocortisone 2.5 % cream Apply  topically to the appropriate area as directed 2 (Two) Times a Day. 20 g 2   • ipratropium-albuterol (DUO-NEB) 0.5-2.5 mg/3 ml nebulizer VVN Q 4 H PRN  6   • metoprolol tartrate (LOPRESSOR) 25 MG tablet Take 37.5 mg by mouth 2 (Two) Times a Day.     • omeprazole (priLOSEC) 40 MG capsule Take 40 mg by mouth Daily.     • polyethylene glycol (MIRALAX) packet Take 17 g by mouth 2 (Two) Times a Day.     • potassium chloride (K-DUR,KLOR-CON) 20 MEQ CR tablet Take 20 mEq by mouth Daily.      • senna (SENOKOT) 8.6 MG tablet Take 1 tablet by mouth Daily.     • predniSONE (DELTASONE) 20 MG tablet        No current facility-administered medications on file prior to visit.        ALLERGIES:     Allergies   Allergen Reactions   • Moxifloxacin Nausea Only     Passed out after taking       SOCIAL HISTORY:  .  Drinks approximately three beers every other day.  Long-standing smoking history. No HIV risk factors.  Social History     Socioeconomic History   • Marital status:      Spouse name: Dirk   • Number of children: Not on file   • Years of education: Not on file   • Highest education level: Not on file   Occupational History     Employer: RETIRED   Tobacco Use   • Smoking status: Former Smoker     Start date: 2011   • Smokeless tobacco: Never Used   • Tobacco comment: quit 6 years ago    //    caffeine use - one soft drink daily    Substance and Sexual Activity   • Alcohol use: No     Comment: Occasional beer, approximately 3 beers every other day.   • Drug use: No   • Sexual activity: Defer         FAMILY HISTORY:  Positive for melanoma in her mother, diagnosed at the age of 70 and  from heart attack at age of 80.  Father had colon cancer at age 70 and  at age of 80 of abdominal carcinomatosis.    Family History   Problem Relation Age of Onset   • Heart disease Mother    • Heart attack Mother    • Melanoma Mother 70   • Colon cancer Father 70   • Cancer Father         Abdominal carcinomatosis   • Stroke Neg Hx        Review of Systems   Constitutional: Positive for fatigue. Negative for activity change, appetite change, chills, diaphoresis, fever and unexpected weight change.   HENT: Negative for congestion, hearing loss, mouth sores, nosebleeds, sore throat, trouble swallowing and voice change.    Eyes: Negative for itching.   Respiratory: Positive for shortness of breath. Negative for cough, chest tightness and wheezing.    Cardiovascular: Negative for chest pain,  palpitations and leg swelling.   Gastrointestinal: Negative for abdominal distention, abdominal pain, blood in stool, constipation, diarrhea, nausea and vomiting.   Endocrine: Negative for cold intolerance and heat intolerance.   Genitourinary: Negative for difficulty urinating, dysuria, frequency, hematuria and urgency.   Musculoskeletal: Positive for arthralgias. Negative for back pain, gait problem, joint swelling, myalgias and neck pain.        No muscle weakness.   Skin: Positive for rash. Negative for wound.   Neurological: Negative for dizziness, seizures, syncope, speech difficulty, weakness, light-headedness, numbness and headaches.   Hematological: Negative for adenopathy. Does not bruise/bleed easily.   Psychiatric/Behavioral: Negative for behavioral problems, confusion, sleep disturbance and suicidal ideas. The patient is not nervous/anxious.          Objective      Vitals:    01/24/20 1152   BP: (!) 87/52   Pulse: 117   Resp: 16   Temp: 98 °F (36.7 °C)   SpO2: 92%      Current Status 1/24/2020   ECOG score 1   Pain 0/10    Physical Exam   Constitutional: She is oriented to person, place, and time. She appears well-developed and well-nourished.   Elderly female no distress seated in a wheelchair   HENT:   Mouth/Throat: Oropharynx is clear and moist.   Eyes: Conjunctivae are normal.   Neck: No thyromegaly present.   Cardiovascular: Normal rate and regular rhythm. Exam reveals no gallop and no friction rub.   No murmur heard.  Pulmonary/Chest: Breath sounds normal. No respiratory distress. She has no wheezes. Right breast exhibits no mass. Left breast exhibits no mass.   Abdominal: Soft. Bowel sounds are normal. She exhibits no distension. There is no tenderness.   Musculoskeletal: She exhibits no edema.   Lymphadenopathy:        Head (right side): No submandibular adenopathy present.     She has no cervical adenopathy.     She has no axillary adenopathy.        Right: No inguinal and no supraclavicular  adenopathy present.        Left: No inguinal and no supraclavicular adenopathy present.   Neurological: She is alert and oriented to person, place, and time. She displays normal reflexes. No cranial nerve deficit. She exhibits normal muscle tone.   Skin: Skin is warm and dry. Rash noted.   Faint skin rash involving bilateral upper extremities and to a lesser extent upper chest with small erythematous macular areas 0.5 cm or less.  Improved from previous exam.   Psychiatric: She has a normal mood and affect. Her behavior is normal.       RECENT LABS:  Hematology WBC   Date Value Ref Range Status   01/24/2020 9.17 3.40 - 10.80 10*3/mm3 Final     RBC   Date Value Ref Range Status   01/24/2020 3.84 3.77 - 5.28 10*6/mm3 Final     Hemoglobin   Date Value Ref Range Status   01/24/2020 10.8 (L) 12.0 - 15.9 g/dL Final     Hematocrit   Date Value Ref Range Status   01/24/2020 34.7 34.0 - 46.6 % Final     Platelets   Date Value Ref Range Status   01/24/2020 366 140 - 450 10*3/mm3 Final        Lab Results   Component Value Date    GLUCOSE 158 (H) 01/03/2020    BUN 20 01/03/2020    CREATININE 1.17 (H) 01/03/2020    EGFRIFNONA 45 (L) 01/03/2020    BCR 17.1 01/03/2020    CO2 26.5 01/03/2020    CALCIUM 9.0 01/03/2020    ALBUMIN 3.90 01/03/2020    AST 11 01/03/2020    ALT 5 01/03/2020           Assessment/Plan      1. Limited stage small cell lung cancer:   · The patient completed concurrent chemoradiation with 6 cycles of cisplatin and -16 in March 2011 and achieved a complete response.   · She did receive PCI completing this in May 2011.    · The patient showed no evidence of recurrent disease.  2. Metastatic non-small cell lung cancer:  · Annual low-dose screening CT scan of the chest.  Follow-up study from 6/6/18 showed new findings with small to moderate right pleural effusion as well as right pleural thickening and potential lymphadenopathy along the right lower lobe bronchovascular bundle although difficult to identify with  certainty.    · Suspicion for potential underlying malignancy. PET scan 6/15/18 showed no significant change in the right pleural effusion with low level activity in the right lower lobe bronchovascular bundle SUV of only 3.7.  There was therefore no definitive suspicious hypermetabolic activity to explain the pleural effusion.    · Diagnostic right thoracentesis on 6/20/18 with 250 cc clear yellow fluid removed with LDH 90, pH 7.5, total protein 1.8, appearing transudative with pathology showing no evidence of malignant cells. Etiology of the findings and pleural effusion unclear.  Patient referred back to cardiology and was started by PCP on Lasix 40 mg daily in July 2018.    · CT chest 7/13/18 with no change and negative/low probability V/Q scan 7/16/18.  · Repeat CT 8/17/18 showed near complete resolution of pleural effusions with only a minimal right pleural effusion remaining and some atelectasis/scarring in the right lower lobe.   · Follow-up CT chest 11/30/2018 with probable scar right lower lobe.  · Patient was hospitalized 5/22-5/27/2019 with suspected viral and bacterial pneumonia.  She did have evidence of metapneumovirus on respiratory viral panel.  CT chest 5/23/2019 showed increased consolidation right base consistent with infiltrate.  There was evidence of scattered groundglass opacity (likely the viral component).  There was an enlarging nodule however with irregular margins left upper lobe associated with left hilum worrisome for malignancy.   · PET scan 7/11/2019 showed enlarging hypermetabolic left upper lobe/perihilar mass 2.8 cm (SUV 19) with additional 6 mm subpleural left upper lobe lesion (SUV 6.2), right medial 10th rib/costal vertebral activity (SUV 4.5), right iliac 3.5 cm lesion (SUV 11.8), left iliac activity (SUV 5.3).  Activity in distal rectum and collapsed colon of unclear significance.   · CT head (unable to obtain MRI) negative for metastatic disease on  7/24/2019  · Bronchoscopy/EBUS 7/22/2019 unable to reach perihilar mass.  Left upper lobe brushings/washings negative for malignancy.    · CT-guided biopsy left upper lobe mass 8/1/2019- for malignancy.    · CT-guided biopsy right iliac lesion on 8/14/19 with poorly differentiated carcinoma. PDL1 15%, insufficient material for molecular testing.  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  Will need to discuss possible referral to genetics clinic to help determine if REEMA mutation is germline for benefit of other family members.  · Initiated palliative radiation to right iliac lesion 8/15/2019, completed on 8/29/2019  · Patient is not a candidate for chemotherapy due to comorbidities.  Discussed single agent palliative immunotherapy with Keytruda versus palliative/supportive care with hospice involvement.  Patient elected to pursue Keytruda, initiated treatment 8/30/2019.  · Added Xgeva 9/20/2019, plan to treat every 6 weeks to coincide with Keytruda schedule.  · PET scan following 3 cycles Keytruda 10/28/2019 with slight increase left upper lobe mass, slight increase left upper lobe pleural lesion, too small left upper lobe nodules, improvement right iliac lesion (was radiated), new small area of activity right acetabulum (no corresponding CT lesion).  Given length of time between previous PET scan and initiation of Keytruda and possibility of some areas representing pseudo-progression along with lack of alternative therapies elected to continue on Keytruda.  · Following 6 cycles Keytruda, PET scan 12/27/19 showed unequivocal evidence of progressive disease with increase in left hilar mass and increase in left pleural-based disease.  The pleural-based mass in the left upper lobe increased, abutting T5, bony disease remained stable with sclerotic appearance.  Keytruda discontinued  1/3/2020.  · Patient felt to be poor candidate again for chemotherapy, discussion regarding potential use of PARP inhibitor due to REEMA mutation.  · Approval for olaparib obtained, plan to initiate treatment 1/31/2020 at reduced dose of 150 mg twice daily due to renal insufficiency and compromised performance status.  · The patient is seen back today in follow-up.  She has remained off of active treatment for her disease since her last visit 1/3/2020 due to progression while on Keytruda.  She actually has done well in the interval with improvement in her performance status, ECOG 2.  She has been more active, ambulating more with the use of her walker and able to get in the pool while in [place].  In the interval I contacted Dr. Forde at [place] and discussed the patient's molecular profile in regards to potential efficacy of olaparib in the setting of her REEMA mutation.  As discussed previously she is not a good candidate for palliative single agent chemotherapy given her frail status, ECOG performance status. Olaparib would represent a potentially better tolerated treatment for her.  Unfortunately there is limited data regarding the use of PARP inhibitors in the setting of REEMA mutations.  Data and prostate cancer is not favorable.  There is very little data and lung cancer.  Nevertheless, treatment does make sense on a molecular level.  We were able to get approval for the drug through the patient's insurance.  I discussed with the patient and her family today the possible use of olaparib at a reduced dose of 150 mg twice daily.  Dose reduction is pursued due to her underlying renal insufficiency and performance status.  We did discuss side effects of therapy today and patient was able to undergo chemo education with nurse practitioner.  The patient does wish to proceed.  She will be returning to [place] tomorrow and will not be back for 1 month.  She should be receiving the medication in 1 week and will therefore  begin treatment around 1/31/2020.  Nurse practitioners will contact the patient on a weekly basis to assess her tolerance while she is out of town.  I will see her back in 1 month to represent 3 weeks on treatment to assess her status.  We did discuss potential repeat scans after 2 months on treatment.  We discussed alternative of palliative/supportive care alone however the patient as above does wish to proceed with treatment.  She is due for monthly Xgeva today which we will administer and she will be due again when she returns here in 1 month.  The patient is scheduled to be seen in the genetics clinic on 2/18/2020 to discuss whether REEMA mutation may represent a germline mutation in relation to potential need for testing of additional family members.  3. Cancer related pain:   · Previous significant pain due to metastatic lesion in the right iliac bone  · Completed palliative radiation right iliac lesion 8/29/2019  · Improvement in right iliac pain following radiation.  · Patient continues with hydrocodone 5/325 as needed, typically requiring approximately 2 doses per day.  4. Narcotic induced constipation:  · Currently controlled with Senokot 2 tablets twice daily  5. History of stage I breast cancer on the left:   · The patient underwent a left mastectomy and completed 5 years of tamoxifen in July 2003.    · Exam on 6/8/2019 was negative.    · Annual right mammogram 9/5/18 was negative, BI-RADS 1.    · We are not pursuing further routine mammography due to patient's metastatic lung cancer.  6. 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 This does affect her balance; it is unchanged.   7. Stage III chronic kidney disease:   · Baseline creatinine in the 1.1-1.2 range.  · Creatinine today 1.29  8. Atrial fibrillation:   · Pacemaker placement 3/13/17 after having a syncopal episode.  · Patient continuing on Eliquis 5 mg twice daily.    9. Mild dementia:   · Short-term memory is very poor.  This has been an ongoing issue for her.  Her scans do show significant progression of small vessel ischemic change.  It is felt that some of this has been precipitated by her previous PCI.  10. Sigmoid colon uptake on PET scan:   · PET scan 6/15/2018 did show activity in the distal sigmoid colon/rectum and the patient was referred back to her gastroenterologist Dr. Colton Watkins.    · He did perform a flexible sigmoidoscopy which was negative on 8/2/18 and likely the activity represents muscular activity in the bowel wall.    · Subsequent PET scan 7/11/2019 showed continued activity in the distal rectum SUV 15.9.  There was also activity throughout the colon, possibly suggestive of inflammation/colitis.    · Patient relatively asymptomatic from GI standpoint.    · Patient was not felt to require any additional endoscopic evaluation at this point.  · PET scan 10/28/2019 and also 12/27/2019 with unchanged activity in the distal rectum.  11. Recurrent epistaxis:  · Patient is continuing on Eliquis for atrial fibrillation, 5 mg twice daily  · Patient developed recurrent epistaxis unclear whether unilateral or bilateral.  · Patient referred to Dr. Dos Santos in ENT, refused cauterization  · Symptoms currently resolved  12. Immunotherapy induced skin rash:  · Mild grade 1 immunotherapy induced skin rash developed with macular erythematous lesions forearms and upper chest at end of cycle 3 Keytruda  · Prescribed topical hydrocortisone cream 2.5% twice daily on 11/1/2019.    · Skin rash stable to improved today.      PLAN:     1. Proceed with monthly Xgeva today  2. Education performed today for olaparib  3. Patient will begin olaparib 150 mg twice daily when she receives the medication in 1 week, around 1/31/2020.  4. The patient will be returning to [place] tomorrow  5. Nurse practitioner staff will contact patient on a weekly basis while in [place] to assess her tolerance  of olaparib.  6. Patient has visit scheduled in genetics clinic on 2/18/2020  7. MD visit in 1 month with CBC, CMP, magnesium, phosphorus.  She will be due for Xgeva.  We will assess her overall status and tolerance of olaparib.      Patient initiating high risk medication requiring intensive monitoring.

## 2020-01-24 NOTE — PROGRESS NOTES
Pt was seen today by Dr Almanzar and she wished to proceed with Lynparza. She will start with 150 mg BID. Pt is to have education today with our NP as she is leaving to go back to [place] tomorrow.     Dr Almanzar would like weekly checks with her. I have notified Mleany (Dr Almanzar also notified the NP's).

## 2020-01-25 NOTE — PROGRESS NOTES
Subjective     No primary care provider on file.    PATIENT NAME:  Hui Workman  YOB: 1945  PATIENTS AGE:  74 y.o.  PATIENTS SEX:  female  DATE OF SERVICE:  01/24/2020  PROVIDER:  YEFRI Jean-Baptiste      __________ORAL CHEMOTHERAPY PATIENT EDUCATION__________    PATIENT EDUCATION:  Today I met with the patient to discuss ORAL chemotherapy/biotherapy recommended for treatment of her disease.  Also discussed were medication administration, adherence, and proper handling/disposal.  The patient received the Oral Chemotherapy/Biotherapy Plan Summary including diagnosis and specific treatment plan.    SIDE EFFECTS:  Common side effects were discussed with the patient and her family.  Discussion included hair loss/discoloration, anemia/fatigue, infection/chills/fever, appetite, bleeding risk/precautions, constipation, diarrhea, mouth sores, taste alteration, loss of appetite,nausea/vomiting, peripheral neuropathy, skin/nail changes, rash, muscle aches/weakness, photosensitivity, weight gain/loss, hearing loss, dizziness, menopausal symptoms, menstrrual irregularity, reproductive risk, high blood pressure, heart damage, liver damage, lung damage, kidney damage, DVT/PE risk, fluid retention, pleural/pericardial effusion, somnolence, electrolyte/LFT imbalance.    Discussion also included side effects specific to drugs in the treatment plan, specifically olaparib.    A total of 25 minutes were spent with the patient, with 100% of time spent in education and counseling.      YEFRI Jean-Baptiste

## 2020-01-31 NOTE — TELEPHONE ENCOUNTER
Patient is going to start taking olaparib 150 mg 2 times a day tomorrow and wants to know if dr. Almanzar wants patient to come in every 30 days to have labs drawn. If so patient has an appointment to come in on  02/21/20 and would need that appointment to be changed         Call patient at  [phone]

## 2020-02-14 NOTE — TELEPHONE ENCOUNTER
Spoke with patient and her , she continues Lynparza twice daily dosing.  The main side effect she is noticed is diminished appetite.  Her  did  Ensure supplements for her to start drinking 1-2 daily.  She is going to weigh tomorrow morning and we can compare that to our weight in office next week.  She denies nausea aside from one episode that she relates to something she ate.  She denies worsening fatigue.  She denies fevers.

## 2020-02-19 NOTE — TELEPHONE ENCOUNTER
Pt. States she is coming home from [place] on Thursday and has appt to see a dermatologist on Friday.  Doesn't know who she is seeing because her niece made the appt for her.  Pt. Has 2 moles on her back that look suspicious.  Inquiring if she can have them removed.  Informed pt. It shouldn't be a problem.  Advised her to make sure the dermatologist knows she is taking lynparza.  understanding noted.

## 2020-02-19 NOTE — TELEPHONE ENCOUNTER
Cornelio Almanzar Jr, MD    Pt wants a nurse to call her regarding 2 moles she has on her back that she is getting removed.    Please call pt back @ [phone]     Thanks

## 2020-02-21 NOTE — PROGRESS NOTES
Subjective .     REASONS FOR FOLLOWUP:    1. History of stage I left breast cancer in 1998, ER/[place] positive, status post mastectomy followed by tamoxifen x 5 years, completed in 07/2003.    2. Limited stage small cell lung cancer with right lower lobe mass, right hilar adenopathy.  Initiation of cisplatin/-16 chemotherapy, cycle 1 initiated 11/17/10.  Concurrent radiation therapy initiated 11/19/10.  Radiation therapy completed 01/11/11.   3. Status post Mediport placement on 12/27/10.  Mediport removed in November 2011.    4. Cycle #6 of cisplatin/-16 chemotherapy initiated 3/1/11.  Complete response seen on subsequent CT scan dated 3/28/11.       5. 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   6. Status post PCI, completed 5-27-11.     7. Right thyroid nodule  8. Cancer related pain receiving hydrocodone 10/325  9. Metastatic non-small cell lung cancer:  · PET scan 7/11/2019 showing enlarging hypermetabolic left upper lobe/perihilar mass 2.8 cm (SUV 19) with additional 6 mm subpleural left upper lobe lesion (SUV 6.2), right medial 10th rib/costal vertebral activity (SUV 4.5), right iliac 3.5 cm lesion (SUV 11.8), left iliac activity (SUV 5.3).  Activity in distal rectum and collapsed colon of unclear significance.   · CT head (unable to obtain MRI) negative for metastatic disease on 7/24/2019  · Bronchoscopy/EBUS 7/22/2019 unable to reach perihilar mass.  Left upper lobe brushings/washings negative for malignancy.    · CT-guided biopsy left upper lobe mass 8/1/2019 negative for malignancy.    · CT-guided biopsy right iliac lesion on 8/14/19 with poorly differentiated carcinoma. PDL1 15%, insufficient material for molecular testing.  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  Will need to discuss possible referral to genetics clinic to help determine  if REEMA mutation is germline for benefit of other family members.  · Initiated palliative radiation to right iliac lesion 8/15/2019, completed on 8/29/2019  · Patient is not a candidate for chemotherapy due to comorbidities.  Discussed single agent palliative immunotherapy with Keytruda versus palliative/supportive care with hospice involvement.  Patient elected to pursue Keytruda, initiated treatment on 8/30/2019.  · Addition of Xgeva 9/28/2019 with plans to administer every 6 weeks to coincide with Keytruda schedule.  · PET scan following 3 cycles Keytruda 10/28/2019 with slight increase left upper lobe mass, slight increase left upper lobe pleural lesion, too small left upper lobe nodules, improvement right iliac lesion (was radiated), new small area of activity right acetabulum (no corresponding CT lesion).  Given length of time between previous PET scan and initiation of Keytruda and possibility of some areas representing pseudo-progression along with lack of alternative therapies elected to continue on Keytruda.  · PET scan 12/27/2019 following 6 cycles Keytruda with clear progression of disease in left lung and pleural space, Keytruda discontinued 1/3/2020.  · Patient felt to be poor candidate again for chemotherapy, discussion regarding potential use of PARP inhibitor due to REEMA mutation.  · Approval for olaparib obtained, initiated treatment 2/1/2020 at reduced dose of 150 mg twice daily due to renal insufficiency and compromised performance status.  10. Immunotherapy induced skin rash:  · Developed at end of cycle 3 Keytruda involving forearms and chest, mild, grade 1  · Topical hydrocortisone 2.5% twice daily    HISTORY OF PRESENT ILLNESS:  The patient is a 74 y.o. year old female who is here for follow-up with the above-mentioned history.    History of Present Illness patient returns today in follow-up continuing on olaparib 150 mg twice daily that was initiated 2/1/2020 along with monthly Xgeva that is  due today.  The patient has been spending much of her time in [place], only returning to attend visits in our office.  She has done extremely well since her last visit.  She has not experienced any significant side effects from treatment apart from slight reduction in appetite for brief time.  She is maintaining her weight.  She does have some intermittent coughing spells, notes some of these occur at night and is unclear whether she may be aspirating.  She notes that her respiratory status overall has remained stable with intermittent wheezing and she is using her nebulizer.  She notes some mild constipation.  Her performance status is stable, ECOG 2.    PAST MEDICAL HISTORY:   1. Chronic obstructive pulmonary disease.    2. Right low thoracic dermatome shingles in 02/12.    3. Cystoscopy with bladder biopsy in 05/2012.    4. Psoriasis identified in the right heel in 2013.    5. Osteoarthritis.  6. Colonoscopy 8/4/15 with 5 mm benign rectal polyp.  7. Right sided thyroid nodule.  8. Atrial fibrillation on anticoagulation with Eliquis 5 mg twice a day.  Pacemaker placed 3/13/17.    SURGICAL HISTORY:    1. Left mastectomy in 1998, left breast reconstruction and right breast implant placement in 1999.    2. Exploratory laparotomy.   3. Status post pacemaker placement left chest wall 3/13/17.     ONCOLOGIC HISTORY:  (History from previous dates can be found in the separate document.)  Past Medical History:   Diagnosis Date   • Aortic valve insufficiency    • Atrial fibrillation and flutter (CMS/HCC)    • Axillary lump    • Breast cancer (CMS/HCC)     Left, stage I; ER/[place] positive   • Chronic kidney disease     Stage 3   • COPD (chronic obstructive pulmonary disease) (CMS/HCC)    • Dizziness     upon standing   • Fatigue    • Hypertension    • Lung cancer (CMS/HCC)    • Neutropenia (CMS/HCC)    • Osteoarthritis    • Paroxysmal atrial fibrillation (CMS/HCC)    • Peripheral neuropathy     Secondary to cisplatin.   •  Pneumonia 06/2019   • Psoriasis     Identified in the right heel in 2013.   • Rectal polyp    • Shingles 02/2012    RIGHT LOW THORACIC    • Small cell lung cancer (CMS/HCC)     Limited stage   • Thyroid nodule     RIGHT   • Wheezing      Past Surgical History:   Procedure Laterality Date   • BREAST IMPLANT SURGERY Right 1999   • BREAST RECONSTRUCTION Left 1999   • BRONCHOSCOPY N/A 7/22/2019    Procedure: BRONCHOSCOPY WITH ENDOBRONCHIAL ULTRASOUND WITH BAL AND BRUSHINGS;  Surgeon: Johnathan Elliott MD;  Location: [facility] ENDOSCOPY;  Service: Pulmonary   • CARDIAC ELECTROPHYSIOLOGY PROCEDURE N/A 3/13/2017    Procedure: Device Implant  DDD pacer  medtronic;  Surgeon: Armani Kennedy MD;  Location: [facility] CATH INVASIVE LOCATION;  Service:    • CATARACT EXTRACTION Bilateral    • COLONOSCOPY     • CYSTOSCOPY BLADDER BIOPSY  05/2012   • EXPLORATORY LAPAROTOMY      Several years ago.   • MASTECTOMY Left 1998   • MEDIPORT INSERTION, SINGLE     • MEDIPORT REMOVAL Right    • PACEMAKER IMPLANTATION           Current Outpatient Medications on File Prior to Visit   Medication Sig Dispense Refill   • apixaban (ELIQUIS) 5 MG tablet tablet Take 5 mg by mouth Every 12 (Twelve) Hours.     • bumetanide (BUMEX) 2 MG tablet Take 2 mg by mouth Daily.     • HYDROcodone-acetaminophen (NORCO) 5-325 MG per tablet Take 1 tablet by mouth Every 6 (Six) Hours As Needed for Moderate Pain . 90 tablet 0   • hydrocortisone 2.5 % cream Apply  topically to the appropriate area as directed 2 (Two) Times a Day. 20 g 2   • ipratropium-albuterol (DUO-NEB) 0.5-2.5 mg/3 ml nebulizer VVN Q 4 H PRN  6   • metoprolol tartrate (LOPRESSOR) 25 MG tablet Take 37.5 mg by mouth 2 (Two) Times a Day.     • Olaparib (LYNPARZA) 150 MG tablet chemo tablet Take 1 tablet by mouth 2 (Two) Times a Day. Indications: C34.92 60 tablet 6   • omeprazole (priLOSEC) 40 MG capsule Take 40 mg by mouth Daily.     • polyethylene glycol (MIRALAX) packet Take 17 g by mouth 2 (Two)  Times a Day.     • potassium chloride (K-DUR,KLOR-CON) 20 MEQ CR tablet Take 20 mEq by mouth Daily.     • senna (SENOKOT) 8.6 MG tablet Take 1 tablet by mouth Daily.       No current facility-administered medications on file prior to visit.        ALLERGIES:     Allergies   Allergen Reactions   • Moxifloxacin Nausea Only     Passed out after taking       SOCIAL HISTORY:  .  Drinks approximately three beers every other day.  Long-standing smoking history. No HIV risk factors.  Social History     Socioeconomic History   • Marital status:      Spouse name: Dirk   • Number of children: Not on file   • Years of education: Not on file   • Highest education level: Not on file   Occupational History     Employer: RETIRED   Tobacco Use   • Smoking status: Former Smoker     Start date: 2011   • Smokeless tobacco: Never Used   • Tobacco comment: quit 6 years ago    //    caffeine use - one soft drink daily    Substance and Sexual Activity   • Alcohol use: No     Comment: Occasional beer, approximately 3 beers every other day.   • Drug use: No   • Sexual activity: Defer         FAMILY HISTORY:  Positive for melanoma in her mother, diagnosed at the age of 70 and  from heart attack at age of 80.  Father had colon cancer at age 70 and  at age of 80 of abdominal carcinomatosis.    Family History   Problem Relation Age of Onset   • Heart disease Mother    • Heart attack Mother    • Melanoma Mother 70   • Colon cancer Father 70   • Cancer Father         Abdominal carcinomatosis   • Stroke Neg Hx        Review of Systems   Constitutional: Positive for appetite change and fatigue. Negative for activity change, chills, diaphoresis, fever and unexpected weight change.   HENT: Negative for congestion, hearing loss, mouth sores, nosebleeds, sore throat, trouble swallowing and voice change.    Eyes: Negative for itching.   Respiratory: Positive for shortness of breath and wheezing. Negative for cough and chest  tightness.    Cardiovascular: Negative for chest pain, palpitations and leg swelling.   Gastrointestinal: Negative for abdominal distention, abdominal pain, blood in stool, constipation, diarrhea, nausea and vomiting.   Endocrine: Negative for cold intolerance and heat intolerance.   Genitourinary: Negative for difficulty urinating, dysuria, frequency, hematuria and urgency.   Musculoskeletal: Positive for arthralgias. Negative for back pain, gait problem, joint swelling, myalgias and neck pain.   Skin: Positive for rash. Negative for wound.   Neurological: Negative for dizziness, seizures, syncope, speech difficulty, weakness, light-headedness, numbness and headaches.   Hematological: Negative for adenopathy. Does not bruise/bleed easily.   Psychiatric/Behavioral: Negative for behavioral problems, confusion, sleep disturbance and suicidal ideas. The patient is not nervous/anxious.          Objective      Vitals:    02/21/20 1222   BP: 102/70   Pulse: 77   Resp: 12   Temp: 98.4 °F (36.9 °C)   SpO2: 98%      Current Status 2/21/2020   ECOG score 1   Pain 0/10    Physical Exam   Constitutional: She is oriented to person, place, and time. She appears well-developed and well-nourished.   Elderly female no distress seated in a wheelchair   HENT:   Mouth/Throat: Oropharynx is clear and moist.   Eyes: Conjunctivae are normal.   Neck: No thyromegaly present.   Cardiovascular: Normal rate and regular rhythm. Exam reveals no gallop and no friction rub.   No murmur heard.  Pulmonary/Chest: Breath sounds normal. No respiratory distress. She has no wheezes. Right breast exhibits no mass. Left breast exhibits no mass.   Abdominal: Soft. Bowel sounds are normal. She exhibits no distension. There is no tenderness.   Musculoskeletal: She exhibits no edema.   Lymphadenopathy:        Head (right side): No submandibular adenopathy present.     She has no cervical adenopathy.     She has no axillary adenopathy.        Right: No inguinal  and no supraclavicular adenopathy present.        Left: No inguinal and no supraclavicular adenopathy present.   Neurological: She is alert and oriented to person, place, and time. She displays normal reflexes. No cranial nerve deficit. She exhibits normal muscle tone.   Skin: Skin is warm and dry. Rash noted.   Faint skin rash involving bilateral upper extremities and to a lesser extent upper chest with small erythematous macular areas 0.5 cm or less.  Improved from previous exam.   Psychiatric: She has a normal mood and affect. Her behavior is normal.   The patient was examined today, unchanged from above.    RECENT LABS:  Hematology WBC   Date Value Ref Range Status   02/21/2020 8.04 3.40 - 10.80 10*3/mm3 Final     RBC   Date Value Ref Range Status   02/21/2020 3.34 (L) 3.77 - 5.28 10*6/mm3 Final     Hemoglobin   Date Value Ref Range Status   02/21/2020 9.4 (L) 12.0 - 15.9 g/dL Final     Hematocrit   Date Value Ref Range Status   02/21/2020 31.8 (L) 34.0 - 46.6 % Final     Platelets   Date Value Ref Range Status   02/21/2020 313 140 - 450 10*3/mm3 Final        Lab Results   Component Value Date    GLUCOSE 106 02/21/2020    BUN 19 02/21/2020    CREATININE 1.39 (H) 02/21/2020    EGFRIFNONA 37 (L) 02/21/2020    BCR 13.7 02/21/2020    CO2 23.7 02/21/2020    CALCIUM 9.4 02/21/2020    ALBUMIN 3.90 02/21/2020    AST 10 02/21/2020    ALT <5 02/21/2020           Assessment/Plan      1. Limited stage small cell lung cancer:   · The patient completed concurrent chemoradiation with 6 cycles of cisplatin and -16 in March 2011 and achieved a complete response.   · She did receive PCI completing this in May 2011.    · The patient showed no evidence of recurrent disease.  2. Metastatic non-small cell lung cancer:  · Annual low-dose screening CT scan of the chest.  Follow-up study from 6/6/18 showed new findings with small to moderate right pleural effusion as well as right pleural thickening and potential lymphadenopathy along  the right lower lobe bronchovascular bundle although difficult to identify with certainty.    · Suspicion for potential underlying malignancy. PET scan 6/15/18 showed no significant change in the right pleural effusion with low level activity in the right lower lobe bronchovascular bundle SUV of only 3.7.  There was therefore no definitive suspicious hypermetabolic activity to explain the pleural effusion.    · Diagnostic right thoracentesis on 6/20/18 with 250 cc clear yellow fluid removed with LDH 90, pH 7.5, total protein 1.8, appearing transudative with pathology showing no evidence of malignant cells. Etiology of the findings and pleural effusion unclear.  Patient referred back to cardiology and was started by PCP on Lasix 40 mg daily in July 2018.    · CT chest 7/13/18 with no change and negative/low probability V/Q scan 7/16/18.  · Repeat CT 8/17/18 showed near complete resolution of pleural effusions with only a minimal right pleural effusion remaining and some atelectasis/scarring in the right lower lobe.   · Follow-up CT chest 11/30/2018 with probable scar right lower lobe.  · Patient was hospitalized 5/22-5/27/2019 with suspected viral and bacterial pneumonia.  She did have evidence of metapneumovirus on respiratory viral panel.  CT chest 5/23/2019 showed increased consolidation right base consistent with infiltrate.  There was evidence of scattered groundglass opacity (likely the viral component).  There was an enlarging nodule however with irregular margins left upper lobe associated with left hilum worrisome for malignancy.   · PET scan 7/11/2019 showed enlarging hypermetabolic left upper lobe/perihilar mass 2.8 cm (SUV 19) with additional 6 mm subpleural left upper lobe lesion (SUV 6.2), right medial 10th rib/costal vertebral activity (SUV 4.5), right iliac 3.5 cm lesion (SUV 11.8), left iliac activity (SUV 5.3).  Activity in distal rectum and collapsed colon of unclear significance.   · CT head  (unable to obtain MRI) negative for metastatic disease on 7/24/2019  · Bronchoscopy/EBUS 7/22/2019 unable to reach perihilar mass.  Left upper lobe brushings/washings negative for malignancy.    · CT-guided biopsy left upper lobe mass 8/1/2019- for malignancy.    · CT-guided biopsy right iliac lesion on 8/14/19 with poorly differentiated carcinoma. PDL1 15%, insufficient material for molecular testing.  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  Will need to discuss possible referral to genetics clinic to help determine if REEMA mutation is germline for benefit of other family members.  · Initiated palliative radiation to right iliac lesion 8/15/2019, completed on 8/29/2019  · Patient is not a candidate for chemotherapy due to comorbidities.  Discussed single agent palliative immunotherapy with Keytruda versus palliative/supportive care with hospice involvement.  Patient elected to pursue Keytruda, initiated treatment 8/30/2019.  · Added Xgeva 9/20/2019, plan to treat every 6 weeks to coincide with Keytruda schedule.  · PET scan following 3 cycles Keytruda 10/28/2019 with slight increase left upper lobe mass, slight increase left upper lobe pleural lesion, too small left upper lobe nodules, improvement right iliac lesion (was radiated), new small area of activity right acetabulum (no corresponding CT lesion).  Given length of time between previous PET scan and initiation of Keytruda and possibility of some areas representing pseudo-progression along with lack of alternative therapies elected to continue on Keytruda.  · Following 6 cycles Keytruda, PET scan 12/27/19 showed unequivocal evidence of progressive disease with increase in left hilar mass and increase in left pleural-based disease.  The pleural-based mass in the left upper lobe increased, abutting T5, bony disease remained stable  with sclerotic appearance.  Keytruda discontinued 1/3/2020.  · Patient felt to be poor candidate again for chemotherapy, discussion regarding potential use of PARP inhibitor due to REEMA mutation.  · Approval for olaparib obtained, initiated treatment 2/1/2020 at reduced dose of 150 mg twice daily due to renal insufficiency and compromised performance status.  · The patient is seen back today in follow-up.  She is tolerating olaparib very well.  She noted a slight decrease in appetite initially however reports that this is stabilized.  She remains remarkably clinically stable at this time.  We will plan to continue on with treatment with olaparib and we will proceed with Xgeva today.  She will be returning to [place] soon.  We will have her return on 4/6/2020 and she will undergo a PET scan that day with stat reading and I will see her the following day on 4/7/2020 to review the results.  We will plan to administer Xgeva at that time as well.  Will contact us in the interval with any new issues.  3. Cancer related pain:   · Previous significant pain due to metastatic lesion in the right iliac bone  · Completed palliative radiation right iliac lesion 8/29/2019  · Improvement in right iliac pain following radiation.  · Patient continues with hydrocodone 5/325 as needed, typically requiring approximately 2 doses per day.  4. Narcotic induced constipation:  · Currently controlled with Senokot 2 tablets twice daily  5. History of stage I breast cancer on the left:   · The patient underwent a left mastectomy and completed 5 years of tamoxifen in July 2003.    · Exam on 6/8/2019 was negative.    · Annual right mammogram 9/5/18 was negative, BI-RADS 1.    · We are not pursuing further routine mammography due to patient's metastatic lung cancer.  6. 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 This does affect her balance; it is unchanged.    7. Stage III chronic kidney disease:   · Baseline creatinine in the 1.1-1.2 range.  · Creatinine today 1.39  8. Atrial fibrillation:   · Pacemaker placement 3/13/17 after having a syncopal episode.  · Patient continuing on Eliquis 5 mg twice daily.   9. Mild dementia:   · Short-term memory is very poor.  This has been an ongoing issue for her.  Her scans do show significant progression of small vessel ischemic change.  It is felt that some of this has been precipitated by her previous PCI.  10. Sigmoid colon uptake on PET scan:   · PET scan 6/15/2018 did show activity in the distal sigmoid colon/rectum and the patient was referred back to her gastroenterologist Dr. oClton Watkins.    · He did perform a flexible sigmoidoscopy which was negative on 8/2/18 and likely the activity represents muscular activity in the bowel wall.    · Subsequent PET scan 7/11/2019 showed continued activity in the distal rectum SUV 15.9.  There was also activity throughout the colon, possibly suggestive of inflammation/colitis.    · Patient relatively asymptomatic from GI standpoint.    · Patient was not felt to require any additional endoscopic evaluation at this point.  · PET scan 10/28/2019 and also 12/27/2019 with unchanged activity in the distal rectum.  11. Recurrent epistaxis:  · Patient is continuing on Eliquis for atrial fibrillation, 5 mg twice daily  · Patient developed recurrent epistaxis unclear whether unilateral or bilateral.  · Patient referred to Dr. Dos Santos in ENT, refused cauterization  · Symptoms currently resolved  12. Immunotherapy induced skin rash:  · Mild grade 1 immunotherapy induced skin rash developed with macular erythematous lesions forearms and upper chest at end of cycle 3 Keytruda  · Prescribed topical hydrocortisone cream 2.5% twice daily on 11/1/2019.    · Skin rash improved.      PLAN:     1. Proceed with monthly Xgeva today  2. Continue olaparib 150 mg twice daily  3. The patient will be returning to  [place] tomorrow  4. The patient has delayed her follow-up visit in genetics clinic until April 5. The patient will return to [facility] in early April.  We will schedule her for PET scan with stat read on 4/6/2020.  I will see her back on 4/7/2020 with CBC, CMP, magnesium, phosphorus and we will administer Xgeva and consider continuation of olaparib pending the patient's tolerance and CT results.    Patient initiating high risk medication requiring intensive monitoring.

## 2020-02-26 NOTE — TELEPHONE ENCOUNTER
Pt. Requesting a refill on her norco 5/325 1 po q 4 hours prn.  Last filled on 1/22/20.  Dr. Almanzar out of the office this week.  Will send to dr. Payton ( dr #2)  for approval and then it will go to her [facility] at [phone].  Pt. V/u.

## 2020-03-13 NOTE — TELEPHONE ENCOUNTER
Pt's  called stating that pt has been coughing up green phlegm for the past few days. She has had a worsening cough for 1-2 weeks. Denies fevers. He called pt's PCP and he prescribed her Cefdinir. She is better today. Advised pt's  to keep her inside and at home until her symptoms resolve. Pt is currently in [place]. Message sent to Dr. Almanzar to make him aware.

## 2020-03-23 NOTE — TELEPHONE ENCOUNTER
----- Message from Heaven Castro RN sent at 3/23/2020 12:30 PM EDT -----  Please r/s scan and notify patient.   ----- Message -----  From: Cornelio Almanzar Jr., MD  Sent: 3/23/2020  12:17 PM EDT  To: Heaven Castro RN    We could do the PET on Thur or Fri the week before to give more time to get read before visit  ----- Message -----  From: Heaven Castro RN  Sent: 3/23/2020  11:14 AM EDT  To: Cornelio Almanzar Jr., MD    Pt , Dirk, called requesting to know if Dr. Almanzar wants pt to have CT done earlier than 4/7/20 as they are not going to [place] anymore.      Dirk also wants to update Dr. Almanzar that Anitha Marshall has prescribed pt amoxicillin and prednisone for congestion

## 2020-03-23 NOTE — TELEPHONE ENCOUNTER
Pt , Dirk, called requesting to know if Dr. Almanzar wants pt to have CT done earlier than 4/7/20 as they are not going to [place] anymore.     Dirk also wants to update Dr. Almanzar that Anitha Marshall has prescribed pt amoxicillin and prednisone.

## 2020-03-30 NOTE — TELEPHONE ENCOUNTER
----- Message from Cornelio Almanzar Jr., MD sent at 3/30/2020 12:10 PM EDT -----  Regarding: RE:  If she is feeling ok, we can move scan and visit out 2-3 weeks  ----- Message -----  From: Juanita Ren RN  Sent: 3/30/2020  11:24 AM EDT  To: Cornelio Almanzar Jr., MD    Pt states she is feeling great . She is having her scan on 4/2. She sees you for labs on 4/8 along with Xgeva. Pt is asking if this is needed. Can she wait? Please advise.

## 2020-03-30 NOTE — TELEPHONE ENCOUNTER
Jose A, patient's , calling to let Dr. Almanzar know how patient is doing.  He said that she is much better after taking the magnesium citrate.     Also, he changed the appointment for her PET scan to 4/2.  He wanted to move appointment with Dr. Almanzar to this week.    Please call Jose A  [phone]

## 2020-03-30 NOTE — TELEPHONE ENCOUNTER
Message sent to Dr. Almanzar asking if the pt's 4/8 appt is needed. Waiting for response. PT V/U.

## 2020-04-02 NOTE — TELEPHONE ENCOUNTER
PT's  states the pt is having increased pain no controlled with 5/325 [place]. He is requesting 10/325. Sent to Dr. Anil GTZ #2 for approval. HE also states the pt has increased SOA, her cough has returned and she has light brown mucous.SHe is not eating or drinking well.  She is currently have her PET scan. All D/W Dr. Ma. Per Dr. Ma pt will take a Z-bo and report to ED if her symptom change, worsen or develops a fever. Z-BO escribed to pt's pharmacy. PT's  V/U.

## 2020-04-02 NOTE — TELEPHONE ENCOUNTER
Pt's  Jose A wants to speak to Dr Hubbard or his NP about the pt she hasn't been eating and is getting weaker , he also wants to know if her pain medication can be changed to the Hydrocodone  ? Pt also has an appt on 4/6/20 he wants to know if Dr Hubbard could just call them with the CT results ?    Jose A contact # [phone]

## 2020-04-03 PROBLEM — R06.02 SHORTNESS OF BREATH: Status: ACTIVE | Noted: 2020-01-01

## 2020-04-03 PROBLEM — J44.1 COPD WITH ACUTE EXACERBATION (HCC): Status: ACTIVE | Noted: 2020-01-01

## 2020-04-03 PROBLEM — Z79.01 LONG TERM CURRENT USE OF ANTICOAGULANT THERAPY: Status: ACTIVE | Noted: 2020-01-01

## 2020-04-03 PROBLEM — J20.9 ACUTE BRONCHITIS: Status: ACTIVE | Noted: 2020-01-01

## 2020-04-03 NOTE — ED NOTES
Pt reports that she has been having increasing SOB and productive cough over the past few days. Was recently prescribed antibiotics, pt is unsure if she has taken any. Pt hypotensive on arrival.       Michelle Daley, RN  04/03/20 [id number]

## 2020-04-03 NOTE — ED PROVIDER NOTES
EMERGENCY DEPARTMENT ENCOUNTER    Room Number:  09/09  Date of encounter:  4/3/2020  PCP: Cornelio Almanzar Jr., MD  Historian: Patient     I used full protective equipment while examining this patient.  This includes face mask, gloves and protective eyewear.  I washed my hands before entering the room and immediately upon leaving the room      HPI:  Chief Complaint: Short of breath  A complete HPI/ROS/PMH/PSH/SH/FH are unobtainable due to: Patient poor historian with dementia, some of history and review of systems obtained via  over the phone.    Context: Hui Workman is a 74 y.o. female who presents to the ED c/o shortness of breath.  Symptoms have been ongoing for several weeks waxing and waning.  Patient has been treated with a number of different antibiotics as an outpatient.  She was started on a Zithromax several days ago by Dr. Ma.  Cough is productive with colored sputum.  Patient denies any recent fever.  Shortness of breath is moderate and worsened by coughing.  Patient denies chest pain.  Patient has known history of lung cancer and is receiving treatments from the [facility], Dr. Almanzar.  She had a PET scan yesterday which showed progression of disease- she is getting palliative medication for metastatic lung cancer.  Patient is a poor historian with dementia, much of the history is obtained from  via phone      PAST MEDICAL HISTORY  Active Ambulatory Problems     Diagnosis Date Noted   • Examination 07/06/2016   • History of left breast cancer 07/06/2016   • Small cell carcinoma of right lung (CMS/HCC) 07/06/2016   • Chemotherapy-induced neuropathy (CMS/HCC) 07/06/2016   • Thyroid nodule 07/06/2016   • Neck pain on right side 12/14/2016   • Aortic valve insufficiency 05/26/2017   • Paroxysmal atrial fibrillation (CMS/HCC) 05/26/2017   • Chronic fatigue 05/31/2017   • Pleural effusion, right 06/27/2018   • Pneumonia due to infectious organism 05/22/2019   • History of lung cancer 05/24/2019    • Bone metastasis (CMS/HCC) 08/07/2019   • Non-small cell cancer of left lung (CMS/HCC) 08/21/2019   • High risk medication use      Resolved Ambulatory Problems     Diagnosis Date Noted   • No Resolved Ambulatory Problems     Past Medical History:   Diagnosis Date   • Atrial fibrillation and flutter (CMS/HCC)    • Axillary lump    • Breast cancer (CMS/HCC)    • Chronic kidney disease    • COPD (chronic obstructive pulmonary disease) (CMS/HCC)    • Dizziness    • Fatigue    • Hypertension    • Lung cancer (CMS/HCC)    • Neutropenia (CMS/HCC)    • Osteoarthritis    • Peripheral neuropathy    • Pneumonia 06/2019   • Psoriasis    • Rectal polyp    • Shingles 02/2012   • Small cell lung cancer (CMS/HCC)    • Wheezing          PAST SURGICAL HISTORY  Past Surgical History:   Procedure Laterality Date   • BREAST IMPLANT SURGERY Right 1999   • BREAST RECONSTRUCTION Left 1999   • BRONCHOSCOPY N/A 7/22/2019    Procedure: BRONCHOSCOPY WITH ENDOBRONCHIAL ULTRASOUND WITH BAL AND BRUSHINGS;  Surgeon: Johnathan Elliott MD;  Location: [facility] ENDOSCOPY;  Service: Pulmonary   • CARDIAC ELECTROPHYSIOLOGY PROCEDURE N/A 3/13/2017    Procedure: Device Implant  DDD pacer  medtronic;  Surgeon: Armani Kennedy MD;  Location: [facility] CATH INVASIVE LOCATION;  Service:    • CATARACT EXTRACTION Bilateral    • COLONOSCOPY     • CYSTOSCOPY BLADDER BIOPSY  05/2012   • EXPLORATORY LAPAROTOMY      Several years ago.   • MASTECTOMY Left 1998   • MEDIPORT INSERTION, SINGLE     • MEDIPORT REMOVAL Right    • PACEMAKER IMPLANTATION           FAMILY HISTORY  Family History   Problem Relation Age of Onset   • Heart disease Mother    • Heart attack Mother    • Melanoma Mother 70   • Colon cancer Father 70   • Cancer Father         Abdominal carcinomatosis   • Stroke Neg Hx          SOCIAL HISTORY  Social History     Socioeconomic History   • Marital status:      Spouse name: Dirk   • Number of children: Not on file   • Years of education:  Not on file   • Highest education level: Not on file   Occupational History     Employer: RETIRED   Tobacco Use   • Smoking status: Former Smoker     Start date: 5/26/2011   • Smokeless tobacco: Never Used   • Tobacco comment: quit 6 years ago    //    caffeine use - one soft drink daily    Substance and Sexual Activity   • Alcohol use: No     Comment: Occasional beer, approximately 3 beers every other day.   • Drug use: No   • Sexual activity: Defer         ALLERGIES  Moxifloxacin       REVIEW OF SYSTEMS  Review of Systems        PHYSICAL EXAM    I have reviewed the triage vital signs and nursing notes.    ED Triage Vitals [04/03/20 1345]   Temp Heart Rate Resp BP SpO2   98.7 °F (37.1 °C) 84 19 -- 94 %      Temp src Heart Rate Source Patient Position BP Location FiO2 (%)   Tympanic -- -- -- --       Physical Exam  GENERAL: Female in no obvious distress.  Blood pressure is noted to be low with systolics ranging from the 80s to 90s.  HENT: nares patent  EYES: no scleral icterus  CV: regular rhythm, regular rate  RESPIRATORY: Coarse rhonchi bilaterally.  Saturations 95% on room air.  Mild tachypnea  ABDOMEN: soft, obese, nontender  MUSCULOSKELETAL: no deformity, no significant swelling  NEURO: Strength, sensation, and coordination are grossly intact.  Speech and mentation are unremarkable  SKIN: warm, dry      LAB RESULTS  Recent Results (from the past 24 hour(s))   Respiratory Panel, PCR - Swab, Nasopharynx    Collection Time: 04/03/20  2:16 PM   Result Value Ref Range    ADENOVIRUS, PCR Not Detected Not Detected    Coronavirus 229E Not Detected Not Detected    Coronavirus HKU1 Not Detected Not Detected    Coronavirus NL63 Not Detected Not Detected    Coronavirus OC43 Not Detected Not Detected    Human Metapneumovirus Not Detected Not Detected    Human Rhinovirus/Enterovirus Not Detected Not Detected    Influenza B PCR Not Detected Not Detected    Parainfluenza Virus 1 Not Detected Not Detected    Parainfluenza Virus  2 Not Detected Not Detected    Parainfluenza Virus 3 Not Detected Not Detected    Parainfluenza Virus 4 Not Detected Not Detected    Bordetella pertussis pcr Not Detected Not Detected    Influenza A H1 2009 PCR Not Detected Not Detected    Chlamydophila pneumoniae PCR Not Detected Not Detected    Mycoplasma pneumo by PCR Not Detected Not Detected    Influenza A PCR Not Detected Not Detected    Influenza A H3 Not Detected Not Detected    Influenza A H1 Not Detected Not Detected    RSV, PCR Not Detected Not Detected    Bordetella parapertussis PCR Not Detected Not Detected   Lactic Acid, Plasma    Collection Time: 04/03/20  2:17 PM   Result Value Ref Range    Lactate 1.4 0.5 - 2.0 mmol/L   CBC Auto Differential    Collection Time: 04/03/20  2:17 PM   Result Value Ref Range    WBC 12.04 (H) 3.40 - 10.80 10*3/mm3    RBC 3.18 (L) 3.77 - 5.28 10*6/mm3    Hemoglobin 9.4 (L) 12.0 - 15.9 g/dL    Hematocrit 29.3 (L) 34.0 - 46.6 %    MCV 92.1 79.0 - 97.0 fL    MCH 29.6 26.6 - 33.0 pg    MCHC 32.1 31.5 - 35.7 g/dL    RDW 19.1 (H) 12.3 - 15.4 %    RDW-SD 63.6 (H) 37.0 - 54.0 fl    MPV 9.2 6.0 - 12.0 fL    Platelets 338 140 - 450 10*3/mm3    Neutrophil % 81.6 (H) 42.7 - 76.0 %    Lymphocyte % 6.3 (L) 19.6 - 45.3 %    Monocyte % 7.1 5.0 - 12.0 %    Eosinophil % 3.7 0.3 - 6.2 %    Basophil % 0.4 0.0 - 1.5 %    Immature Grans % 0.9 (H) 0.0 - 0.5 %    Neutrophils, Absolute 9.82 (H) 1.70 - 7.00 10*3/mm3    Lymphocytes, Absolute 0.76 0.70 - 3.10 10*3/mm3    Monocytes, Absolute 0.85 0.10 - 0.90 10*3/mm3    Eosinophils, Absolute 0.45 (H) 0.00 - 0.40 10*3/mm3    Basophils, Absolute 0.05 0.00 - 0.20 10*3/mm3    Immature Grans, Absolute 0.11 (H) 0.00 - 0.05 10*3/mm3    nRBC 0.0 0.0 - 0.2 /100 WBC   Comprehensive Metabolic Panel    Collection Time: 04/03/20  2:18 PM   Result Value Ref Range    Glucose 113 (H) 65 - 99 mg/dL    BUN 23 8 - 23 mg/dL    Creatinine 1.76 (H) 0.57 - 1.00 mg/dL    Sodium 134 (L) 136 - 145 mmol/L    Potassium 4.3 3.5  - 5.2 mmol/L    Chloride 95 (L) 98 - 107 mmol/L    CO2 24.9 22.0 - 29.0 mmol/L    Calcium 8.5 (L) 8.6 - 10.5 mg/dL    Total Protein 6.6 6.0 - 8.5 g/dL    Albumin 3.70 3.50 - 5.20 g/dL    ALT (SGPT) 6 1 - 33 U/L    AST (SGOT) 6 1 - 32 U/L    Alkaline Phosphatase 76 39 - 117 U/L    Total Bilirubin 0.3 0.2 - 1.2 mg/dL    eGFR Non African Amer 28 (L) >60 mL/min/1.73    Globulin 2.9 gm/dL    A/G Ratio 1.3 g/dL    BUN/Creatinine Ratio 13.1 7.0 - 25.0    Anion Gap 14.1 5.0 - 15.0 mmol/L   Lactate Dehydrogenase    Collection Time: 04/03/20  2:18 PM   Result Value Ref Range     (L) 135 - 214 U/L   Procalcitonin    Collection Time: 04/03/20  2:18 PM   Result Value Ref Range    Procalcitonin 0.28 (H) 0.10 - 0.25 ng/mL   C-reactive Protein    Collection Time: 04/03/20  2:18 PM   Result Value Ref Range    C-Reactive Protein 7.98 (H) 0.00 - 0.50 mg/dL   Troponin    Collection Time: 04/03/20  2:18 PM   Result Value Ref Range    Troponin T 0.013 0.000 - 0.030 ng/mL       Ordered the above labs and independently reviewed the results.      RADIOLOGY  Xr Chest Ap    Result Date: 4/3/2020  ONE-VIEW PORTABLE CHEST AT 2:09 PM  HISTORY: Left-sided lung cancer. Cough and fever.  FINDINGS:  There are tumor masses in the left upper lobe medially as best seen on yesterday's PET fusion CT scan. There is also some irregular pleural thickening at the left base extending laterally related to metastatic pleural involvement. There is some chronic parenchymal and pleural scarring at the right base medially unchanged from the chest x-ray dated 10/04/2019. I cannot completely exclude a component of superimposed pneumonia in any of these regions.  The heart remains mildly enlarged with a pacemaker in place.  This report was finalized on 4/3/2020 3:28 PM by Dr. Guilherme Steinbock, M.D.        I ordered the above noted radiological studies. Reviewed by me and discussed with radiologist.  See dictation for official radiology  interpretation.      PROCEDURES  Procedures      MEDICATIONS GIVEN IN ER    Medications   sodium chloride 0.9 % flush 10 mL (has no administration in time range)   sodium chloride 0.9 % infusion (125 mL/hr Intravenous New Bag 4/3/20 1545)   sodium chloride 0.9 % bolus 500 mL (500 mL Intravenous New Bag 4/3/20 1524)         PROGRESS, DATA ANALYSIS, CONSULTS, AND MEDICAL DECISION MAKING    All labs have been independently reviewed by me.  All radiology studies have been reviewed by me and discussed with radiologist dictating the report.   EKG's independently viewed and interpreted by me.  Discussion below represents my analysis of pertinent findings related to patient's condition, differential diagnosis, treatment plan and final disposition.      ED Course as of Apr 03 1551   Fri Apr 03, 2020   1355 Spent roughly 5 minutes reviewing patient's prior medical records including oncology work-up and evaluation via Dr. Almanzar.  Patient has ongoing lung cancer palliatively.  She did have a recent PET scan.    [DB]   1356 I reviewed results of recent PET scan which showed interval progression of her disease.    [DB]   1442 I discussed x-ray with Dr. Guilherme Wong.  He reports that there is bilateral density which is most likely representative of progression of tumor when looked at in conjunction with the PET scan from yesterday.  Underlying pneumonia cannot be excluded but all in all the picture is more suggestive of progression of neoplastic disease.    [DB]   1442 EKG          EKG time: 1418  Rhythm/Rate: Paced 61  P waves and IN: Atrial paced P waves  QRS, axis: Normal axis, normal QRS  ST and T waves: Unremarkable ST and T waves    Interpreted Contemporaneously by me, independently viewed  Not significantly changed compared to 2019      [DB]   1523 Patient's 's phone number cellular is [phone].  His name is Jose A Workman.    [DB]   1533 Patient is remained persistently hypotensive in the ER with systolics averaging  around 80.  She had been given a 500 cc bolus of normal saline without real improvement.  I will repeat another 500 cc bolus and then at 125/h.  Labs have been reviewed and are remarkable for an elevated creatinine of 1.76 which is up from baseline of around 1.2-1.3.  I believe that patient is dehydrated.  Chest x-ray as described above and not necessarily consistent with acute pneumonia.  White blood count is somewhat elevated at 12.04.  Lactic acid is within normal limits.  Patient has been taking oral azithromycin and I will defer choice of antibiotics or use of antibiotics to the medical service.    [DB]   1535 I did discuss results of testing and need for admission with patient's  via cell phone.    [DB]   1549 I discussed patient's evaluation with Dr. Lu who will admit this patient.  We discussed antibiotic treatment and we felt that would best to hold at this time.    [DB]      ED Course User Index  [DB] Landon Diallo MD       AS OF 15:51 VITALS:    BP - 93/40  HR - 84  TEMP - 98.7 °F (37.1 °C) (Tympanic)  O2 SATS - 94%      DIAGNOSIS  Final diagnoses:   Shortness of breath   Dehydration   Hypotension, unspecified hypotension type   Small cell carcinoma of right lung (CMS/HCC)   Non-small cell cancer of left lung (CMS/HCC)         DISPOSITION  Admission         Landon Diallo MD  04/03/20 9726

## 2020-04-03 NOTE — TELEPHONE ENCOUNTER
PT's sputum is still brown but improved some from yesterday. She has gurgling that does not clear with cough. Her SOA is the same as yesterday. He denies she has a fever. New symptom and hospice referral request reviewed with More Alvarado NP.  More is contacting Dr. Almanzar. Per Dr. Macho Almanzar pt is to go to ED to be evaluated. Pt's  is taking her to [facility] and V/U.

## 2020-04-03 NOTE — ED TRIAGE NOTES
Pt c/o SOA X 3 weeks, states it has been worsening the past few days. Mask placed on pt at triage. Pt arrives awake alert abc's intact

## 2020-04-03 NOTE — ED NOTES
Full PPE was worn in the room at all times including mask, goggles, gown and gloves.      Michelle Daley RN  04/03/20 [id number]

## 2020-04-04 NOTE — CONSULTS
Subjective     REASON FOR CONSULTATION: 1. PROGRESSIVE LEFT  LUNG CANCER, CANCER PAIN SEVERE, SOB, COUGH, NO FEVER.BONE METASTASIS    2.TESTED FOR COVID 19 PENDING    Provide an opinion on any further workup or treatment                             REQUESTING PHYSICIAN:  DR KARAN GTZ    RECORDS OBTAINED:  Records of the patients history including those obtained from the referring provider were reviewed and summarized in detail.        History of Present Illness In order to assess this patient who has been tested for COVID-19 but I do not think that she has any picture consistent with this condition, I have discussed issues with the patient's  on the telephone, telephone number [phone] and followed up all of the information available to me from the emergency room and available from my partner Cornelio Almanzar MD, in Epic.    In summary this patient is a 74-year-old white female who has diagnosis of dementia who has had progressive amount of shortness of breath for the last few days and required admission through the emergency room yesterday. She has had chronic cough associated with her cancer but she has not had any fever. The patient's  and the patient have been on quarantine during the last month or so. In any event the patient had a PET scan on 04/02/2020 that documented substantial progression of her lung cancer on the left side and associated bone metastasis. The patient actually was taking her pain medicine around the clock in the last few days even though she has been very reluctant to do this because she was not ready to modify her behavior in this regard. The pain is located close to the thoracic spine on the left side. She has not had any alteration in her gait or mobility. Her appetite has been declining, she has not had any nausea or vomiting and the cough has not been productive. She has not had any fever. Her bowel activity has been sluggish given the use of narcotic medication. Urination  has been normal. She has been able to communicate all of these issues to the patient's .     The patient denies any other issues pertinent to this admission besides discussing the case with the patient's nurse this morning, she has significant pain that is not controlled with Tylenol that she has been receiving.         Past Medical History:   Diagnosis Date   • Aortic valve insufficiency    • Atrial fibrillation and flutter (CMS/HCC)    • Axillary lump    • Breast cancer (CMS/HCC)     Left, stage I; ER/SC positive   • Chronic kidney disease     Stage 3   • COPD (chronic obstructive pulmonary disease) (CMS/HCC)    • Dizziness     upon standing   • Fatigue    • Hypertension    • Lung cancer (CMS/HCC)    • Neutropenia (CMS/HCC)    • Osteoarthritis    • Paroxysmal atrial fibrillation (CMS/HCC)    • Peripheral neuropathy     Secondary to cisplatin.   • Pneumonia 06/2019   • Psoriasis     Identified in the right heel in 2013.   • Rectal polyp    • Shingles 02/2012    RIGHT LOW THORACIC    • Small cell lung cancer (CMS/HCC)     Limited stage   • Thyroid nodule     RIGHT   • Wheezing         Past Surgical History:   Procedure Laterality Date   • BREAST IMPLANT SURGERY Right 1999   • BREAST RECONSTRUCTION Left 1999   • BRONCHOSCOPY N/A 7/22/2019    Procedure: BRONCHOSCOPY WITH ENDOBRONCHIAL ULTRASOUND WITH BAL AND BRUSHINGS;  Surgeon: Johnathan Elliott MD;  Location: [facility] ENDOSCOPY;  Service: Pulmonary   • CARDIAC ELECTROPHYSIOLOGY PROCEDURE N/A 3/13/2017    Procedure: Device Implant  DDD pacer  medtronic;  Surgeon: Armani Kennedy MD;  Location: [facility] CATH INVASIVE LOCATION;  Service:    • CATARACT EXTRACTION Bilateral    • COLONOSCOPY     • CYSTOSCOPY BLADDER BIOPSY  05/2012   • EXPLORATORY LAPAROTOMY      Several years ago.   • MASTECTOMY Left 1998   • MEDIPORT INSERTION, SINGLE     • MEDIPORT REMOVAL Right    • PACEMAKER IMPLANTATION          No current facility-administered medications on file prior  to encounter.      Current Outpatient Medications on File Prior to Encounter   Medication Sig Dispense Refill   • apixaban (ELIQUIS) 5 MG tablet tablet Take 5 mg by mouth Every 12 (Twelve) Hours.     • bumetanide (BUMEX) 2 MG tablet Take 2 mg by mouth Daily.     • HYDROcodone-acetaminophen (NORCO)  MG per tablet Take 1 tablet by mouth Every 6 (Six) Hours As Needed for Moderate Pain . (Patient taking differently: Take 1 tablet by mouth Every 4 (Four) Hours As Needed for Moderate Pain .) 90 tablet 0   • ipratropium-albuterol (DUO-NEB) 0.5-2.5 mg/3 ml nebulizer VVN Q 4 H PRN  6   • metoprolol tartrate (LOPRESSOR) 25 MG tablet Take 25 mg by mouth 2 (Two) Times a Day.     • omeprazole (priLOSEC) 40 MG capsule Take 40 mg by mouth Daily.     • polyethylene glycol (MIRALAX) packet Take 17 g by mouth 2 (Two) Times a Day. (Patient taking differently: Take 17 g by mouth Daily.)     • potassium chloride (K-DUR,KLOR-CON) 20 MEQ CR tablet Take 20 mEq by mouth Daily With Dinner.     • senna (SENOKOT) 8.6 MG tablet Take 2 tablets by mouth 2 (Two) Times a Day.     • azithromycin (ZITHROMAX) 250 MG tablet Take 2 tablets the first day, then 1 tablet daily for 4 days. 6 tablet 0   • HYDROcodone-acetaminophen (NORCO) 5-325 MG per tablet Take 1 tablet by mouth Every 6 (Six) Hours As Needed for Moderate Pain . 90 tablet 0   • hydrocortisone 2.5 % cream Apply  topically to the appropriate area as directed 2 (Two) Times a Day. 20 g 2   • Olaparib (LYNPARZA) 150 MG tablet chemo tablet Take 1 tablet by mouth 2 (Two) Times a Day. Indications: C34.92 60 tablet 6        ALLERGIES:    Allergies   Allergen Reactions   • Moxifloxacin Nausea Only     Passed out after taking        Social History     Socioeconomic History   • Marital status:      Spouse name: Dirk   • Number of children: Not on file   • Years of education: Not on file   • Highest education level: Not on file   Occupational History     Employer: RETIRED   Tobacco Use      • Smoking status: Former Smoker     Start date: 5/26/2011   • Smokeless tobacco: Never Used   • Tobacco comment: quit 6 years ago    //    caffeine use - one soft drink daily    Substance and Sexual Activity   • Alcohol use: No     Comment: Occasional beer, approximately 3 beers every other day.   • Drug use: No   • Sexual activity: Defer        Family History   Problem Relation Age of Onset   • Heart disease Mother    • Heart attack Mother    • Melanoma Mother 70   • Colon cancer Father 70   • Cancer Father         Abdominal carcinomatosis   • Stroke Neg Hx       1. ONCOLOGIC HISTORY DR MEHREEN HARVEY MD:Limited stage small cell lung cancer:   · The patient completed concurrent chemoradiation with 6 cycles of cisplatin and -16 in March 2011 and achieved a complete response.   · She did receive PCI completing this in May 2011.    · The patient showed no evidence of recurrent disease.  2. Metastatic non-small cell lung cancer:  · Annual low-dose screening CT scan of the chest.  Follow-up study from 6/6/18 showed new findings with small to moderate right pleural effusion as well as right pleural thickening and potential lymphadenopathy along the right lower lobe bronchovascular bundle although difficult to identify with certainty.    · Suspicion for potential underlying malignancy. PET scan 6/15/18 showed no significant change in the right pleural effusion with low level activity in the right lower lobe bronchovascular bundle SUV of only 3.7.  There was therefore no definitive suspicious hypermetabolic activity to explain the pleural effusion.    · Diagnostic right thoracentesis on 6/20/18 with 250 cc clear yellow fluid removed with LDH 90, pH 7.5, total protein 1.8, appearing transudative with pathology showing no evidence of malignant cells. Etiology of the findings and pleural effusion unclear.  Patient referred back to cardiology and was started by PCP on Lasix 40 mg daily in July 2018.    · CT chest 7/13/18 with  no change and negative/low probability V/Q scan 7/16/18.  · Repeat CT 8/17/18 showed near complete resolution of pleural effusions with only a minimal right pleural effusion remaining and some atelectasis/scarring in the right lower lobe.   · Follow-up CT chest 11/30/2018 with probable scar right lower lobe.  · Patient was hospitalized 5/22-5/27/2019 with suspected viral and bacterial pneumonia.  She did have evidence of metapneumovirus on respiratory viral panel.  CT chest 5/23/2019 showed increased consolidation right base consistent with infiltrate.  There was evidence of scattered groundglass opacity (likely the viral component).  There was an enlarging nodule however with irregular margins left upper lobe associated with left hilum worrisome for malignancy.   · PET scan 7/11/2019 showed enlarging hypermetabolic left upper lobe/perihilar mass 2.8 cm (SUV 19) with additional 6 mm subpleural left upper lobe lesion (SUV 6.2), right medial 10th rib/costal vertebral activity (SUV 4.5), right iliac 3.5 cm lesion (SUV 11.8), left iliac activity (SUV 5.3).  Activity in distal rectum and collapsed colon of unclear significance.   · CT head (unable to obtain MRI) negative for metastatic disease on 7/24/2019  · Bronchoscopy/EBUS 7/22/2019 unable to reach perihilar mass.  Left upper lobe brushings/washings negative for malignancy.    · CT-guided biopsy left upper lobe mass 8/1/2019- for malignancy.    · CT-guided biopsy right iliac lesion on 8/14/19 with poorly differentiated carcinoma. PDL1 15%, insufficient material for molecular testing.  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  Will need to discuss possible referral to genetics clinic to help determine if REEMA mutation is germline for benefit of other family members.  · Initiated palliative radiation to right iliac lesion 8/15/2019,  completed on 8/29/2019  · Patient is not a candidate for chemotherapy due to comorbidities.  Discussed single agent palliative immunotherapy with Keytruda versus palliative/supportive care with hospice involvement.  Patient elected to pursue Keytruda, initiated treatment 8/30/2019.  · Added Xgeva 9/20/2019, plan to treat every 6 weeks to coincide with Keytruda schedule.  · PET scan following 3 cycles Keytruda 10/28/2019 with slight increase left upper lobe mass, slight increase left upper lobe pleural lesion, too small left upper lobe nodules, improvement right iliac lesion (was radiated), new small area of activity right acetabulum (no corresponding CT lesion).  Given length of time between previous PET scan and initiation of Keytruda and possibility of some areas representing pseudo-progression along with lack of alternative therapies elected to continue on Keytruda.  · Following 6 cycles Keytruda, PET scan 12/27/19 showed unequivocal evidence of progressive disease with increase in left hilar mass and increase in left pleural-based disease.  The pleural-based mass in the left upper lobe increased, abutting T5, bony disease remained stable with sclerotic appearance.  Keytruda discontinued 1/3/2020.  · Patient felt to be poor candidate again for chemotherapy, discussion regarding potential use of PARP inhibitor due to REEMA mutation.  · Approval for olaparib obtained, plan to initiate treatment 1/31/2020 at reduced dose of 150 mg twice daily due to renal insufficiency and compromised performance status.  · The patient is seen back today in follow-up.  She has remained off of active treatment for her disease since her last visit 1/3/2020 due to progression while on Keytruda.  She actually has done well in the interval with improvement in her performance status, ECOG 2.  She has been more active, ambulating more with the use of her walker and able to get in the pool while in [place].  In the interval I contacted   Eula at [facility] and discussed the patient's molecular profile in regards to potential efficacy of olaparib in the setting of her REEMA mutation.  As discussed previously she is not a good candidate for palliative single agent chemotherapy given her frail status, ECOG performance status. Olaparib would represent a potentially better tolerated treatment for her.  Unfortunately there is limited data regarding the use of PARP inhibitors in the setting of REEMA mutations.  Data and prostate cancer is not favorable.  There is very little data and lung cancer.  Nevertheless, treatment does make sense on a molecular level.  We were able to get approval for the drug through the patient's insurance.  I discussed with the patient and her family today the possible use of olaparib at a reduced dose of 150 mg twice daily.  Dose reduction is pursued due to her underlying renal insufficiency and performance status.  We did discuss side effects of therapy today and patient was able to undergo chemo education with nurse practitioner.  The patient does wish to proceed.  She will be returning to [place] tomorrow and will not be back for 1 month.  She should be receiving the medication in 1 week and will therefore begin treatment around 1/31/2020.  Nurse practitioners will contact the patient on a weekly basis to assess her tolerance while she is out of town.  I will see her back in 1 month to represent 3 weeks on treatment to assess her status.  We did discuss potential repeat scans after 2 months on treatment.  We discussed alternative of palliative/supportive care alone however the patient as above does wish to proceed with treatment.  She is due for monthly Xgeva today which we will administer and she will be due again when she returns here in 1 month.  The patient is scheduled to be seen in the genetics clinic on 2/18/2020 to discuss whether REEMA mutation may represent a germline mutation in relation to potential need for testing of  additional family members.  3. Cancer related pain:   · Previous significant pain due to metastatic lesion in the right iliac bone  · Completed palliative radiation right iliac lesion 8/29/2019  · Improvement in right iliac pain following radiation.  · Patient continues with hydrocodone 5/325 as needed, typically requiring approximately 2 doses per day.  4. Narcotic induced constipation:  · Currently controlled with Senokot 2 tablets twice daily  5. History of stage I breast cancer on the left:   · The patient underwent a left mastectomy and completed 5 years of tamoxifen in July 2003.    · Exam on 6/8/2019 was negative.    · Annual right mammogram 9/5/18 was negative, BI-RADS 1.    · We are not pursuing further routine mammography due to patient's metastatic lung cancer.  6. 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 This does affect her balance; it is unchanged.   7. Stage III chronic kidney disease:   · Baseline creatinine in the 1.1-1.2 range.  · Creatinine today 1.29  8. Atrial fibrillation:   · Pacemaker placement 3/13/17 after having a syncopal episode.  · Patient continuing on Eliquis 5 mg twice daily.   9. Mild dementia:   · Short-term memory is very poor.  This has been an ongoing issue for her.  Her scans do show significant progression of small vessel ischemic change.  It is felt that some of this has been precipitated by her previous PCI.  10. Sigmoid colon uptake on PET scan:   · PET scan 6/15/2018 did show activity in the distal sigmoid colon/rectum and the patient was referred back to her gastroenterologist Dr. Colton Watkins.    · He did perform a flexible sigmoidoscopy which was negative on 8/2/18 and likely the activity represents muscular activity in the bowel wall.    · Subsequent PET scan 7/11/2019 showed continued activity in the distal rectum SUV 15.9.  There was also activity throughout the colon, possibly  suggestive of inflammation/colitis.    · Patient relatively asymptomatic from GI standpoint.    · Patient was not felt to require any additional endoscopic evaluation at this point.  · PET scan 10/28/2019 and also 12/27/2019 with unchanged activity in the distal rectum.  11. Recurrent epistaxis:  · Patient is continuing on Eliquis for atrial fibrillation, 5 mg twice daily  · Patient developed recurrent epistaxis unclear whether unilateral or bilateral.  · Patient referred to Dr. Dos Santos in ENT, refused cauterization  · Symptoms currently resolved  12. Immunotherapy induced skin rash:  · Mild grade 1 immunotherapy induced skin rash developed with macular erythematous lesions forearms and upper chest at end of cycle 3 Keytruda  · Prescribed topical hydrocortisone cream 2.5% twice daily on 11/1/2019.    · Skin rash stable to improved today.       Review of Systems   Constitutional: Positive for activity change and fatigue.   HENT: Negative.    Respiratory: Positive for cough and shortness of breath.    Cardiovascular: Negative.    Gastrointestinal: Positive for constipation.   Genitourinary: Negative.    Musculoskeletal: Positive for back pain.   Skin: Negative.    Neurological: Negative.    Hematological: Negative.    Psychiatric/Behavioral: Negative.         Objective     Vitals:    04/03/20 1835 04/03/20 2100 04/04/20 0552 04/04/20 0843   BP:  105/67 111/61 124/69   BP Location:  Right arm Right arm Right arm   Patient Position:  Lying Sitting Lying   Pulse: 87 99 109 97   Resp:  20 22 20   Temp:  97.1 °F (36.2 °C) 97.3 °F (36.3 °C) 97.4 °F (36.3 °C)   TempSrc:  Oral Oral Oral   SpO2: 96% 94% 96% 97%   Weight:       Height:         Current Status 2/21/2020   ECOG score 1       Physical Exam  NONE PT COVID UI HIGH RISK EXPOSURE    RECENT LABS:  Hematology WBC   Date Value Ref Range Status   04/04/2020 12.77 (H) 3.40 - 10.80 10*3/mm3 Final     RBC   Date Value Ref Range Status   04/04/2020 3.15 (L) 3.77 - 5.28 10*6/mm3  Final     Hemoglobin   Date Value Ref Range Status   04/04/2020 9.4 (L) 12.0 - 15.9 g/dL Final     Hematocrit   Date Value Ref Range Status   04/04/2020 28.9 (L) 34.0 - 46.6 % Final     Platelets   Date Value Ref Range Status   04/04/2020 312 140 - 450 10*3/mm3 Final      Respiratory Panel, PCR - Swab, Nasopharynx   Order: [id number]   Status:  Final result   Visible to patient:  No (Not Released)   Next appt:  04/06/2020 at 12:30 PM in [facility] ([facility] CHAIR [facility])   Specimen Information: Nasopharynx; Swab        Component  Ref Range & Units    ADENOVIRUS, PCR  Not Detected Not Detected    Coronavirus 229E  Not Detected Not Detected    Coronavirus HKU1  Not Detected Not Detected    Coronavirus NL63  Not Detected Not Detected    Coronavirus OC43  Not Detected Not Detected    Human Metapneumovirus  Not Detected Not Detected    Human Rhinovirus/Enterovirus  Not Detected Not Detected    Influenza B PCR  Not Detected Not Detected    Parainfluenza Virus 1  Not Detected Not Detected    Parainfluenza Virus 2  Not Detected Not Detected    Parainfluenza Virus 3  Not Detected Not Detected    Parainfluenza Virus 4  Not Detected Not Detected    Bordetella pertussis pcr  Not Detected Not Detected    Influenza A H1 2009 PCR  Not Detected Not Detected    Chlamydophila pneumoniae PCR  Not Detected Not Detected    Mycoplasma pneumo by PCR  Not Detected Not Detected    Influenza A PCR  Not Detected Not Detected    Influenza A H3  Not Detected Not Detected    Influenza A H1  Not Detected Not Detected    RSV, PCR  Not Detected Not Detected    Bordetella parapertussis PCR  Not Detected Not Detected    Resulting Agency  [facility]      Narrative   Performed by: [facility]   The coronavirus on the RVP is NOT COVID-19 and is NOT indicative of infection with COVID-19.       Specimen Collected: 04/03/20 14:16 Last Resulted: 04/03/20 15:46             Component  Ref Range & Units 1d ago   C-Reactive Protein  0.00 - 0.50 mg/dL  "7.98High              Related Result Highlights         Procalcitonin  Final result 4/3/2020             Comprehensive Metabolic Panel  Final result 4/3/2020             Lactate Dehydrogenase  Final result 4/3/2020             Troponin  Final result 4/3/2020                  Contains abnormal data Procalcitonin   Order: [id number]   Collected:  4/3/2020 14:18   Specimen Information: Blood        View Full Report  Component  Ref Range & Units 1d ago   Procalcitonin  0.10 - 0.25 ng/mL 0.28High        Narrative     As a Marker for Sepsis (Non-Neonates):   1. <0.5 ng/mL represents a low risk of severe sepsis and/or septic shock.  1. >2 ng/mL represents a high risk of severe sepsis and/or septic shock.    As a Marker for Lower Respiratory Tract Infections that require antibiotic therapy:  PCT on Admission     Antibiotic Therapy             6-12 Hrs later  > 0.5                Strongly Recommended            >0.25 - <0.5         Recommended  0.1 - 0.25           Discouraged                   Remeasure/reassess PCT  <0.1                 Strongly Discouraged          Remeasure/reassess PCT      As 28 day mortality risk marker: \"Change in Procalcitonin Result\" (> 80 % or <=80 %) if Day 0 (or Day 1) and Day 4 values are available. Refer to http://www.shopandsaves-pct-calculator.com/   Change in PCT <=80 %   A decrease of PCT levels below or equal to 80 % defines a positive change in PCT test result representing a higher risk for 28-day all-cause mortality of patients diagnosed with severe sepsis or septic shock.  Change in PCT > 80 %   A decrease of PCT levels of more than 80 % defines a negative change in PCT result representing a lower risk for 28-day all-cause mortality of patients diagnosed with severe sepsis or septic shock.                Results may be falsely decreased if patient taking Biotin.          Related Result Highlights         Comprehensive Metabolic Panel  Final result 4/3/2020             Lactate Dehydrogenase  " Final result 4/3/2020             C-reactive Protein  Final result 4/3/2020             Troponin  Final result 4/3/2020                  Contains abnormal data Lactate Dehydrogenase   Order: [id number]   Collected:  4/3/2020 14:18   Specimen Information: Blood        View Full Report  Component  Ref Range & Units 1d ago   LDH  135 - 214 U/L 132Low              Related Result Highlights         Procalcitonin  Final result 4/3/2020             Comprehensive Metabolic Panel  Final result 4/3/2020             C-reactive Protein  Final result 4/3/2020             Troponin  Final result 4/3/2020                  Contains abnormal data Comprehensive Metabolic Panel   Order: [id number]   Collected:  4/3/2020 14:18   Specimen Information: Blood        View Full Report  Component  Ref Range & Units 1d ago   Glucose  65 - 99 mg/dL 113High     BUN  8 - 23 mg/dL 23    Creatinine  0.57 - 1.00 mg/dL 1.76High     Sodium  136 - 145 mmol/L 134Low     Potassium  3.5 - 5.2 mmol/L 4.3    Chloride  98 - 107 mmol/L 95Low     CO2  22.0 - 29.0 mmol/L 24.9    Calcium  8.6 - 10.5 mg/dL 8.5Low     Total Protein  6.0 - 8.5 g/dL 6.6    Albumin  3.50 - 5.20 g/dL 3.70    ALT (SGPT)  1 - 33 U/L 6    AST (SGOT)  1 - 32 U/L 6    Alkaline Phosphatase  39 - 117 U/L 76    Total Bilirubin  0.2 - 1.2 mg/dL 0.3    eGFR Non African Amer  >60 mL/min/1.73 28Low     Globulin  gm/dL 2.9    A/G Ratio  g/dL 1.3    BUN/Creatinine Ratio  7.0 - 25.0 13.1    Anion Gap  5.0 - 15.0 mmol/L 14.1       Narrative     GFR Normal >60  Chronic Kidney Disease <60  Kidney Failure <15         Related Result Highlights         Procalcitonin  Final result 4/3/2020             Lactate Dehydrogenase  Final result 4/3/2020             C-reactive Protein  Final result 4/3/2020             Troponin  Final result 4/3/2020                  Lactic Acid, Plasma   Order: [id number]   Collected:  4/3/2020 14:17   Specimen Information: Blood        View Full Report  Component  Ref Range & Units  1d ago   Lactate  0.5 - 2.0 mmol/L 1.4                F-18 FDG PET FROM SKULL BASE TO MID THIGH WITH PET/CT FUSION     HISTORY: Lung cancer, restaging     TECHNIQUE: Radiation dose reduction techniques were utilized, including  automated exposure control and exposure modulation based on body size.   Blood glucose level at time of injection was 109 mg/dL.  6.4 mCi of F-18  FDG were injected and PET was performed from skull base to mid thigh. CT  was obtained for localization and attenuation correction. Time at  injection 0838. PET start time 1022.      Compared with PET/CT dating back to 10/28/2019     FINDINGS:      There is no cervical adenopathy demonstrating FDG uptake above that of  blood pool.     The thyroid, submandibular and parotid glands demonstrate roughly  symmetric FDG uptake.     There is no mediastinal or axillary adenopathy demonstrating FDG uptake  significantly above that of blood pool.     Bilateral breast implants present with findings of intracapsular implant  rupture. There is a left-sided pacemaker.     The heart is normal in size. Asymmetric moderate to intense FDG uptake  is present within the right chest wall musculature. There is asymmetric  hypertrophy of the right chest wall musculature on noncontrast CT, as  before. Findings are favored to be physiologic     Mild-to-moderate emphysema is present. Atelectasis extends from the  right hilum through the right lower lobe, as seen since 10/28/2019.     Pleural-based intensely FDG avid left upper lobe mass has increased in  size, measuring 3.8 x 3.4 cm ( previously 2.8 x 3.5 cm).. Demonstrates a  max SUV of 23.4 (previously 25). The lesion also demonstrates increased  soft tissue extension medially and posteriorly into the subpleural fat  with more contact over the left aspect of the T5 vertebral body and  abutting the lateral aspect of the left neuroforamen as well.  Groundglass opacification within the left apex has increased overall,  some  areas of which are somewhat nodular. Pulmonary mass more inferiorly  within the perihilar aspect of the left upper lobe has also increased in  size, measuring 5.4 x 4.3 cm (previously 4.2 x 3.7 cm) and demonstrates  intense, primarily peripheral FDG uptake with a max SUV of 31.5  (previously 24). Irregular interlobular septal thickening, pulmonary and  groundglass opacification extends from the periphery of these masses  with a few tiny surrounding satellite nodules, as before, but appears to  have increased in extent, primarily along the superomedial aspect of the  left apical pleural-based mass.      A pulmonary nodule abutting the the pleura along the posterior aspect  of the left upper lobe measures 0.8 x 1.3 cm (previously up to 0.7 cm  and demonstrates a max SUV of 12.8 (previously 3.6).     There has been interval improvement in the degree of previously seen  left lower lobe pulmonary consolidation since 12/27/2019. The small left  pleural effusion is also decreased in size. Pulmonary opacification  within the periphery of the left lower lobe continues to demonstrate  moderate to intense FDG uptake max SUV of 5. Focal nodular area of  pulmonary opacification within the posterior aspect of the left lower  lobe measures 1.5 x 0.9 m and has decreased in size since 12/27/2019,  previously measuring 2.3 x 1.4 cm. It demonstrates a max SUV of 5.2  (previously 6.4).     There is no suspicious focal FDG avid lesion within the liver,  gallbladder, pancreas, spleen or kidneys. Asymmetric atrophy of the  right kidney is present, as before. There is no hydronephrosis.  Incidental note is made of bilateral dependent collecting systems.     There is a new focus of moderate to intense FDG uptake within the left  adrenal gland with max SUV of 5.3 which is mildly above that of  background liver. Unfortunately this area is not well evaluated on  noncontrast CT given the orientation of the adrenal gland in this  location as  well as respiratory motion artifact and a discrete adrenal  lesion is not able to be visualized.     There is no abdominopelvic adenopathy demonstrating FDG uptake above  that of blood pool.     There are somewhat focal intense FDG uptake within the distal rectum and  anus which has been seen since 06/15/2018 concerning max SUV of 18  (previously 10.8 on 06/15/2018 and 15.9 on 07/11/2019). This area is not  well evaluated on noncontrast CT but it appears to be thickened, as  before. Soft tissue thickening and fat stranding within the presacral  soft tissues and mesorectal since 12/27/2019. The appendix is  unremarkable. Colonic diverticulosis is present. There is no free  intraperitoneal air.     There is FDG uptake throughout the pelvis and proximal femurs. Somewhat  more focal FDG uptake is present within the right proximal femoral  diaphysis and left hemipelvis which demonstrates a max SUV of 5.2 x 5.3,  respectively. No lesion is visualized on noncontrast CT. Mixed lytic and  blastic osseous lesions are present within the pelvis and spine, as  before. Index lytic and blastic lesion within the right ilium abutting  the sacroiliac joint demonstrates a max SUV of 3.2 (previously 4.4).        IMPRESSION:  1.  Interval increase in size of the left upper lobe pulmonary masses  with the more inferior perihilar mass also demonstrating increased FDG  uptake. The more superior pleural-based lesion within the left apex  demonstrates increased invasion into the subpleural fat and posterior  mediastinum with more contact of the T5 and T6 vertebral bodies and  apparent extension to the lateral aspect of the left neural foramina.  The relationship of this mass with underlying exiting nerve roots can be  further evaluated with MRI of the thoracic spine with and without  contrast if clinically indicated.  2.  Increased presumed lymphangitic spread of malignancy extending from  the left upper lobe masses as above.  3.  Increase  in size and FDG uptake of a discrete left upper lobe  pulmonary nodule likely representing metastatic disease.  4.  New focus of moderate to intense FDG uptake within the left adrenal  gland which is mildly above that of background liver, highly concerning  for metastatic disease. However, the location of this lesion is  difficult to evaluate on corresponding noncontrast CT given respiratory  motion artifact and orientation of the adrenal gland. Findings can be  further evaluated with dedicated CT the abdomen with contrast with  breath-hold if clinically indicated.  5.  Continued increase in FDG uptake within the distal rectum/anus with  apparent thickening in this area. While this finding has been present  since 2018, there is new fat stranding and soft tissue thickening within  the mesorectum and presacral soft tissues. While this latter finding may  be related to underlying proctitis in the appropriate context,  constellation of findings remain concerning for underlying neoplasm and  correlation with endoscopy should be considered.  6.  Osseous metastatic disease with continued decrease in FDG uptake of  index lesion within the right ilium. FDG uptake throughout the pelvis  and proximal femurs is heterogenous. However, there are a few areas of  asymmetric somewhat focal moderate to intense FDG uptake, primarily  within the right proximal femoral diaphysis and left hemipelvis which  are nonspecific but raise concern for underlying metastases. Continued  close attention on follow-up is recommended.  7.  Interval decrease in the degree of previously seen left lower lobe  pulmonary consolidation and masslike pulmonary nodularity demonstrating  moderate to intense FDG uptake as above. The small left pleural effusion  is also decreased in size.  8.  Other findings as above     This report was finalized on 4/3/2020 12:13 PM by Dr. Jesus Washington M.D.     ONE-VIEW PORTABLE CHEST AT 2:09 PM     HISTORY: Left-sided lung  cancer. Cough and fever.     FINDINGS:  There are tumor masses in the left upper lobe medially as  best seen on yesterday's PET fusion CT scan. There is also some  irregular pleural thickening at the left base extending laterally  related to metastatic pleural involvement. There is some chronic  parenchymal and pleural scarring at the right base medially unchanged  from the chest x-ray dated 10/04/2019. I cannot completely exclude a  component of superimposed pneumonia in any of these regions.     The heart remains mildly enlarged with a pacemaker in place.     This report was finalized on 4/3/2020 3:28 PM by Dr. Guilherme Wong M.D.    Assessment/Plan  In summary this patient has had lung cancer on the left side nonsmall cell kind that she has had for a while and being treated with Keytruda as a single agent. The patient has been admitted to the hospital with in crescendo pain located in the thoracic spine and associated with her cancer. The PET scan is very clear in the limited activity of the tumor not only related to the lung per se but also related close to areas of the spine and the pain is not surprising under the present circumstances. The PET scan also documents areas of abnormal uptake in the skeleton and there is an area of abnormal uptake in the adrenal gland as well. The liver is clear. The right lung is clear.     I think the shortness of breath is associated with her cancer and associated probably with COPD. I do not see anything that would suggest cardiac manifestations of Keytruda side effect at this time.     I also have reviewed the laboratory parameters on this patient and they remain more or less stable in comparison with previous assessment with the exception of a bump in the creatinine to 1.7 yesterday that is improving today. The patient received saline solution in the emergency room 500 cc.     The patient has been taking Zithromax at home prescribed by my partner Cornelio Almanzar MD, and she will  complete this medicine very soon.     I do not think the patient has pneumonia or anything pertinent to COVID-19. The patient's  has been perfectly healthy and they have been in quarantine for the last month or so.    The viral respiratory panel was negative.    I have personally reviewed the PET scan pictures and compared this with the previous PET scan done in December. I agree with the radiologists interpretation.     As I mentioned I have talked with the nurse taking care of the patient on the floor and I have talked with the patient's , phone number [phone].    I pointed out to him given the circumstances the patient eventually is going to require hospice care. He was ready for this and he has had a conversation with Cornelio Almanzar MD, about this fact as well.     Other than that we will pursue the initiation of Lortab 10 mg every 4 hours prn. She will use MiraLax for narcotic induced constipation and Zofran for nausea on prn basis.     She will remain on oxygen.     I will decrease the dose of prednisone from 80 mg a day to 40 mg a day and hopefully this will be tapered.    Zithromax will be continued and completed.     I will further discuss the patient's case with Cornelio Almanzar MD.

## 2020-04-04 NOTE — PROGRESS NOTES
Name: Hui Workman ADMIT: 4/3/2020   : 1945  PCP: Cornelio Almanzar Jr., MD    MRN: [id number] LOS: 1 days   AGE/SEX: 74 y.o. female  ROOM: [facility]   Subjective   Chief Complaint   Patient presents with   • Shortness of Breath      She reports some improvement since presentation. No CP. Dyspnea and cough are improved now. No NVD.    Objective   Vital Signs  Temp:  [97.1 °F (36.2 °C)-98.7 °F (37.1 °C)] 97.4 °F (36.3 °C)  Heart Rate:  [] 97  Resp:  [18-22] 20  BP: ()/(40-72) 124/69  SpO2:  [91 %-97 %] 97 %  on   ;   Device (Oxygen Therapy): room air  Body mass index is 28.49 kg/m².    Physical Exam   Constitutional: She appears well-developed. No distress.   HENT:   Head: Atraumatic.   Nose: Nose normal.   Eyes: Conjunctivae and EOM are normal.   Neck: Neck supple. No tracheal deviation present.   Cardiovascular: Normal rate, regular rhythm and intact distal pulses.   Pulmonary/Chest: Effort normal. She has wheezes. She has no rales.   Abdominal: Soft. She exhibits no distension. There is no tenderness. There is no rebound and no guarding.   Musculoskeletal: She exhibits no edema or tenderness.   Neurological: She is alert. No cranial nerve deficit.   Skin: Skin is warm and dry. She is not diaphoretic.   Psychiatric: She has a normal mood and affect. Her behavior is normal.   Nursing note and vitals reviewed.      Results Review:       I reviewed the patient's new clinical results.     I reviewed imaging, agree with interpretation.     I reviewed telemetry/EKG results, sinus.    Results from last 7 days   Lab Units 20  0612 20  1417   WBC 10*3/mm3 12.77* 12.04*   HEMOGLOBIN g/dL 9.4* 9.4*   PLATELETS 10*3/mm3 312 338     Results from last 7 days   Lab Units 20  0612 20  1418   SODIUM mmol/L 133* 134*   POTASSIUM mmol/L 4.2 4.3   CHLORIDE mmol/L 100 95*   CO2 mmol/L 20.4* 24.9   BUN mg/dL 20 23   CREATININE mg/dL 1.24* 1.76*   GLUCOSE mg/dL 157* 113*   Estimated Creatinine  Clearance: 41.2 mL/min (A) (by C-G formula based on SCr of 1.24 mg/dL (H)).  Results from last 7 days   Lab Units 04/04/20  0612 04/03/20  1418   CALCIUM mg/dL 8.0* 8.5*   ALBUMIN g/dL  --  3.70         apixaban 5 mg Oral Q12H   budesonide-formoterol 2 puff Inhalation BID - RT   cefTRIAXone 1 g Intravenous Q24H   guaiFENesin 600 mg Oral Q12H   [START ON 4/5/2020] predniSONE 40 mg Oral Daily   tiotropium bromide monohydrate 2 puff Inhalation Daily - RT      Diet Regular    Assessment/Plan      Active Hospital Problems    Diagnosis  POA   • **Shortness of breath [R06.02]  Yes   • Acute bronchitis [J20.9]  Unknown   • Long term current use of anticoagulant therapy [Z79.01]  Not Applicable   • Non-small cell cancer of left lung (CMS/HCC) [C34.92]  Yes   • Bone metastasis (CMS/HCC) [C79.51]  Yes   • Paroxysmal atrial fibrillation (CMS/HCC) [I48.0]  Yes   • Small cell carcinoma of right lung (CMS/HCC) [C34.91]  Yes   • 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       · SOA: Possibility on PNA exists with elevated procalcitonin. This is in the setting of metastatic lung cancer. She does have some bronchospasm on exam. Have added urinary antigens and will give rocephin/azithromycin with her elevated procalcitonin. Pulmonology has initiated breathing treatments and weaned steroids. Pulmonology following.  · COVID 19 testing pending  · Metastatic NSCLC: Oncology consulted.  · COPD  · PAF: On eliquis. Rate ok. Resume metoprolol with hold parameters.  · MELANIA: Improving Cr today. Continue to hold diuretic. Poor po intake reported.  · Disposition: TBD    Andrew Virgen MD  [facility]  04/04/20  12:14    Dictated portions using Dragon dictation software.

## 2020-04-04 NOTE — CONSULTS
Patient Care Team:  Cornelio Almanzar Jr., MD as PCP - General (Hematology and Oncology)  Cornelio Almanzar Jr., MD as Consulting Physician (Hematology and Oncology)  Anitha Haynes MD as Referring Physician (Internal Medicine)  Mónica Gilbert MD as Consulting Physician (Radiation Oncology)      Subjective     Patient is a 74 y.o. female.  With a history of dementia and lung cancer on palliative chemotherapy whom asked to see for bronchitis and to help rule out Covid 19.  Patient has an extensive history looks like she had a limited stage small cell carcinoma for which she was treated with combined chemoradiation therapy with cis-platinum and -16 and completed that in March 2011 with a complete response.  In 6/2018 she had a new right pleural effusion pleural thickening and adenopathy work-up was consistent with a transudate and basically had resolved apparently.  In 5 of 19 she had a new left upper lobe nodule and PET scanning and July of last year showed hypermetabolic activity in the left upper lobe and perihilar mass also additional of the left upper lobe subpleural mass right medial 10th rib activity right iliac crest lesion with increased activity activity in the distal rectum and collapse: Of uncertain significance.  Eventually she had biopsies that confirmed a poorly differentiated non-small cell carcinoma PD-L1 15% initially treated with Keytruda eventually Xgeva was added she gets some radiation to the right iliac lesion unfortunately she had progression on this regimen.  In February of this year she started Olaparib.  She additionally has a history of stage I breast cancer on the left and she had mastectomy and 5 years of tamoxifen in July 2003 she additionally has a history of atrial fibrillation, aortic valve disease chronic kidney disease stage III, COPD and a peripheral neuropathy secondary to her chemotherapy.  She is a former smoker having quit in 2011.  She was admitted  yesterday with shortness of breath increasing weakness and poor appetite.  I am not sure how valuable patient's history is she clearly has some confusion and I see the history of dementia.  She denies shortness of breath and denies that that is why she came to the hospital although I see that was the principal complaint she says it was because she was weak.  She denies fevers chills sweats she denies exposure to anyone with Covid 19 or any acute infection that she is aware of no recent travel outside of this area.  No history of TB that she is aware of no nausea or vomiting she may have had a little bit of diarrhea 1 or 2 loose stools last week but none this week.  No sore throat.  She does have a little bit of nonproductive cough.      Review of Systems:  Continue current bronchodilator therapyNo easy bleeding or bruising no blood clots no lower extremity pain or swelling recently no chest pain or palpitations no recent headaches or visual changes no bad heartburn indigestion or belching no polyuria polydipsia heat or cold intolerance      History  Past Medical History:   Diagnosis Date   • Aortic valve insufficiency    • Atrial fibrillation and flutter (CMS/HCC)    • Axillary lump    • Breast cancer (CMS/HCC)     Left, stage I; ER/NE positive   • Chronic kidney disease     Stage 3   • COPD (chronic obstructive pulmonary disease) (CMS/HCC)    • Dizziness     upon standing   • Fatigue    • Hypertension    • Lung cancer (CMS/HCC)    • Neutropenia (CMS/HCC)    • Osteoarthritis    • Paroxysmal atrial fibrillation (CMS/HCC)    • Peripheral neuropathy     Secondary to cisplatin.   • Pneumonia 06/2019   • Psoriasis     Identified in the right heel in 2013.   • Rectal polyp    • Shingles 02/2012    RIGHT LOW THORACIC    • Small cell lung cancer (CMS/HCC)     Limited stage   • Thyroid nodule     RIGHT   • Wheezing      Past Surgical History:   Procedure Laterality Date   • BREAST IMPLANT SURGERY Right 1999   • BREAST  RECONSTRUCTION Left 1999   • BRONCHOSCOPY N/A 7/22/2019    Procedure: BRONCHOSCOPY WITH ENDOBRONCHIAL ULTRASOUND WITH BAL AND BRUSHINGS;  Surgeon: Johnathan Elliott MD;  Location: [facility] ENDOSCOPY;  Service: Pulmonary   • CARDIAC ELECTROPHYSIOLOGY PROCEDURE N/A 3/13/2017    Procedure: Device Implant  DDD pacer  medtronic;  Surgeon: Armani Kennedy MD;  Location: [facility] CATH INVASIVE LOCATION;  Service:    • CATARACT EXTRACTION Bilateral    • COLONOSCOPY     • CYSTOSCOPY BLADDER BIOPSY  05/2012   • EXPLORATORY LAPAROTOMY      Several years ago.   • MASTECTOMY Left 1998   • MEDIPORT INSERTION, SINGLE     • MEDIPORT REMOVAL Right    • PACEMAKER IMPLANTATION       Social History     Socioeconomic History   • Marital status:      Spouse name: Dirk   • Number of children: Not on file   • Years of education: Not on file   • Highest education level: Not on file   Occupational History     Employer: RETIRED   Tobacco Use   • Smoking status: Former Smoker     Start date: 5/26/2011   • Smokeless tobacco: Never Used   • Tobacco comment: quit 6 years ago    //    caffeine use - one soft drink daily    Substance and Sexual Activity   • Alcohol use: No     Comment: Occasional beer, approximately 3 beers every other day.   • Drug use: No   • Sexual activity: Defer     Family History   Problem Relation Age of Onset   • Heart disease Mother    • Heart attack Mother    • Melanoma Mother 70   • Colon cancer Father 70   • Cancer Father         Abdominal carcinomatosis   • Stroke Neg Hx          Allergies:  Moxifloxacin    Medications:  Prior to Admission medications    Medication Sig Start Date End Date Taking? Authorizing Provider   apixaban (ELIQUIS) 5 MG tablet tablet Take 5 mg by mouth Every 12 (Twelve) Hours.   Yes Provider, MD Bhavik   bumetanide (BUMEX) 2 MG tablet Take 2 mg by mouth Daily.   Yes Provider, MD Bhavik   HYDROcodone-acetaminophen (NORCO)  MG per tablet Take 1 tablet by mouth Every 6  (Six) Hours As Needed for Moderate Pain .  Patient taking differently: Take 1 tablet by mouth Every 4 (Four) Hours As Needed for Moderate Pain . 4/2/20  Yes Angelica Ma MD   ipratropium-albuterol (DUO-NEB) 0.5-2.5 mg/3 ml nebulizer VVN Q 4 H PRN 12/2/19  Yes ProviderBhavik MD   metoprolol tartrate (LOPRESSOR) 25 MG tablet Take 25 mg by mouth 2 (Two) Times a Day.   Yes ProviderBhavik MD   omeprazole (priLOSEC) 40 MG capsule Take 40 mg by mouth Daily.   Yes ProviderBhavik MD   polyethylene glycol (MIRALAX) packet Take 17 g by mouth 2 (Two) Times a Day.  Patient taking differently: Take 17 g by mouth Daily. 5/27/19  Yes Jerod Juan MD   potassium chloride (K-DUR,KLOR-CON) 20 MEQ CR tablet Take 20 mEq by mouth Daily With Dinner.   Yes ProviderBhavik MD   senna (SENOKOT) 8.6 MG tablet Take 2 tablets by mouth 2 (Two) Times a Day.   Yes ProviderBhavik MD   azithromycin (ZITHROMAX) 250 MG tablet Take 2 tablets the first day, then 1 tablet daily for 4 days. 4/2/20   Cornelio Almanzar Jr., MD   HYDROcodone-acetaminophen (NORCO) 5-325 MG per tablet Take 1 tablet by mouth Every 6 (Six) Hours As Needed for Moderate Pain . 2/26/20   Vishnu Payton MD   hydrocortisone 2.5 % cream Apply  topically to the appropriate area as directed 2 (Two) Times a Day. 11/1/19   Cornelio Almanzar Jr., MD   Olaparib (LYNPARZA) 150 MG tablet chemo tablet Take 1 tablet by mouth 2 (Two) Times a Day. Indications: C34.92 1/27/20   Cornelio Almanzar Jr., MD       apixaban 5 mg Oral Q12H   azithromycin 250 mg Oral Q24H   guaiFENesin 600 mg Oral Q12H   predniSONE 40 mg Oral Q12H          Objective     Vital Signs  Vital Sign Min/Max for last 24 hours  Temp  Min: 97.1 °F (36.2 °C)  Max: 98.7 °F (37.1 °C)   BP  Min: 93/40  Max: 124/69   Pulse  Min: 84  Max: 111   Resp  Min: 18  Max: 22   SpO2  Min: 91 %  Max: 97 %   No data recorded   Weight  Min: 80.1 kg (176 lb 8 oz)  Max: 80.1 kg (176 lb 8 oz)       Intake/Output Summary  "(Last 24 hours) at 4/4/2020 0940  Last data filed at 4/3/2020 1616  Gross per 24 hour   Intake 500 ml   Output --   Net 500 ml     No intake/output data recorded.  Last Weight and Admission Weight        04/03/20  1354   Weight: 80.1 kg (176 lb 8 oz)     Flowsheet Rows      First Filed Value   Admission Height  167.6 cm (66\") Documented at 04/03/2020 1354   Admission Weight  80.1 kg (176 lb 8 oz) Documented at 04/03/2020 1354          Body mass index is 28.49 kg/m².           Physical Exam:  General Appearance: Well-developed white female she is resting comfortably in bed on room air her oxygen saturations 100% she does not appear in any acute distress.  She is very pleasant she is quite confused however  Eyes: Conjunctiva are clear and anicteric pupils are equal  ENT: Mucous membranes are moist no erythema no exudates  Neck: No adenopathy or thyromegaly no jugular venous tension trachea midline  Lungs: She has a sort of fixed inspiratory and expiratory central type wheeze that in the more mid chest but she can sort of hear it radiated up and down.  I do not hear rales or rhonchi.  There may be minimal dullness to percussion in the left base not much  Cardiac: Regular rate rhythm no murmur no gallop  Abdomen: Soft no palpable hepatosplenomegaly or masses  : Not examined  Musculoskeletal: Grossly normal  Skin: No jaundice no petechiae skin is warm and dry  Neuro: She is aware that she is a hospital in who she is not really oriented to date and year.  She is following commands with all 4 extremities in fact she was up walking in the room much to the nurses angst on my arrival  Extremities/P Vascular: No clubbing no cyanosis no edema palpable radial and dorsalis pedis pulses bilaterally  MSE: Seems to be in good spirits      Labs:  Results from last 7 days   Lab Units 04/04/20  0612 04/03/20  1418   GLUCOSE mg/dL 157* 113*   SODIUM mmol/L 133* 134*   POTASSIUM mmol/L 4.2 4.3   CO2 mmol/L 20.4* 24.9   CHLORIDE mmol/L " 100 95*   ANION GAP mmol/L 12.6 14.1   CREATININE mg/dL 1.24* 1.76*   BUN mg/dL 20 23   BUN / CREAT RATIO  16.1 13.1   CALCIUM mg/dL 8.0* 8.5*   EGFR IF NONAFRICN AM mL/min/1.73 42* 28*   ALK PHOS U/L  --  76   TOTAL PROTEIN g/dL  --  6.6   ALT (SGPT) U/L  --  6   AST (SGOT) U/L  --  6   BILIRUBIN mg/dL  --  0.3   ALBUMIN g/dL  --  3.70   GLOBULIN gm/dL  --  2.9     Estimated Creatinine Clearance: 41.2 mL/min (A) (by C-G formula based on SCr of 1.24 mg/dL (H)).      Results from last 7 days   Lab Units 04/04/20  0612 04/03/20  1417   WBC 10*3/mm3 12.77* 12.04*   RBC 10*6/mm3 3.15* 3.18*   HEMOGLOBIN g/dL 9.4* 9.4*   HEMATOCRIT % 28.9* 29.3*   MCV fL 91.7 92.1   MCH pg 29.8 29.6   MCHC g/dL 32.5 32.1   RDW % 19.0* 19.1*   RDW-SD fl 63.4* 63.6*   MPV fL 9.3 9.2   PLATELETS 10*3/mm3 312 338   NEUTROPHIL % %  --  81.6*   LYMPHOCYTE % %  --  6.3*   MONOCYTES % %  --  7.1   EOSINOPHIL % %  --  3.7   BASOPHIL % %  --  0.4   IMM GRAN % %  --  0.9*   NEUTROS ABS 10*3/mm3  --  9.82*   LYMPHS ABS 10*3/mm3  --  0.76   MONOS ABS 10*3/mm3  --  0.85   EOS ABS 10*3/mm3  --  0.45*   BASOS ABS 10*3/mm3  --  0.05   IMMATURE GRANS (ABS) 10*3/mm3  --  0.11*   NRBC /100 WBC  --  0.0         Results from last 7 days   Lab Units 04/03/20  1418   TROPONIN T ng/mL 0.013             Results from last 7 days   Lab Units 04/03/20  1418 04/03/20  1417   LACTATE mmol/L  --  1.4   PROCALCITONIN ng/mL 0.28*  --          Microbiology Results (last 10 days)     Procedure Component Value - Date/Time    Respiratory Panel, PCR - Swab, Nasopharynx [[id number]]  (Normal) Collected:  04/03/20 1416    Lab Status:  Final result Specimen:  Swab from Nasopharynx Updated:  04/03/20 1549     ADENOVIRUS, PCR Not Detected     Coronavirus 229E Not Detected     Coronavirus HKU1 Not Detected     Coronavirus NL63 Not Detected     Coronavirus OC43 Not Detected     Human Metapneumovirus Not Detected     Human Rhinovirus/Enterovirus Not Detected     Influenza B PCR Not  Detected     Parainfluenza Virus 1 Not Detected     Parainfluenza Virus 2 Not Detected     Parainfluenza Virus 3 Not Detected     Parainfluenza Virus 4 Not Detected     Bordetella pertussis pcr Not Detected     Influenza A H1 2009 PCR Not Detected     Chlamydophila pneumoniae PCR Not Detected     Mycoplasma pneumo by PCR Not Detected     Influenza A PCR Not Detected     Influenza A H3 Not Detected     Influenza A H1 Not Detected     RSV, PCR Not Detected     Bordetella parapertussis PCR Not Detected    Narrative:       The coronavirus on the RVP is NOT COVID-19 and is NOT indicative of infection with COVID-19.             Diagnostics:  Nm Pet Skull Base To Mid Thigh    Result Date: 4/3/2020  F-18 FDG PET FROM SKULL BASE TO MID THIGH WITH PET/CT FUSION  HISTORY: Lung cancer, restaging  TECHNIQUE: Radiation dose reduction techniques were utilized, including automated exposure control and exposure modulation based on body size. Blood glucose level at time of injection was 109 mg/dL.  6.4 mCi of F-18 FDG were injected and PET was performed from skull base to mid thigh. CT was obtained for localization and attenuation correction. Time at injection 0838. PET start time 1022.  Compared with PET/CT dating back to 10/28/2019  FINDINGS:  There is no cervical adenopathy demonstrating FDG uptake above that of blood pool.  The thyroid, submandibular and parotid glands demonstrate roughly symmetric FDG uptake.  There is no mediastinal or axillary adenopathy demonstrating FDG uptake significantly above that of blood pool.  Bilateral breast implants present with findings of intracapsular implant rupture. There is a left-sided pacemaker.  The heart is normal in size. Asymmetric moderate to intense FDG uptake is present within the right chest wall musculature. There is asymmetric hypertrophy of the right chest wall musculature on noncontrast CT, as before. Findings are favored to be physiologic  Mild-to-moderate emphysema is present.  Atelectasis extends from the right hilum through the right lower lobe, as seen since 10/28/2019.  Pleural-based intensely FDG avid left upper lobe mass has increased in size, measuring 3.8 x 3.4 cm ( previously 2.8 x 3.5 cm).. Demonstrates a max SUV of 23.4 (previously 25). The lesion also demonstrates increased soft tissue extension medially and posteriorly into the subpleural fat with more contact over the left aspect of the T5 vertebral body and abutting the lateral aspect of the left neuroforamen as well. Groundglass opacification within the left apex has increased overall, some areas of which are somewhat nodular. Pulmonary mass more inferiorly within the perihilar aspect of the left upper lobe has also increased in size, measuring 5.4 x 4.3 cm (previously 4.2 x 3.7 cm) and demonstrates intense, primarily peripheral FDG uptake with a max SUV of 31.5 (previously 24). Irregular interlobular septal thickening, pulmonary and groundglass opacification extends from the periphery of these masses with a few tiny surrounding satellite nodules, as before, but appears to have increased in extent, primarily along the superomedial aspect of the left apical pleural-based mass.   A pulmonary nodule abutting the the pleura along the posterior aspect of the left upper lobe measures 0.8 x 1.3 cm (previously up to 0.7 cm and demonstrates a max SUV of 12.8 (previously 3.6).  There has been interval improvement in the degree of previously seen left lower lobe pulmonary consolidation since 12/27/2019. The small left pleural effusion is also decreased in size. Pulmonary opacification within the periphery of the left lower lobe continues to demonstrate moderate to intense FDG uptake max SUV of 5. Focal nodular area of pulmonary opacification within the posterior aspect of the left lower lobe measures 1.5 x 0.9 m and has decreased in size since 12/27/2019, previously measuring 2.3 x 1.4 cm. It demonstrates a max SUV of 5.2 (previously  6.4).  There is no suspicious focal FDG avid lesion within the liver, gallbladder, pancreas, spleen or kidneys. Asymmetric atrophy of the right kidney is present, as before. There is no hydronephrosis. Incidental note is made of bilateral dependent collecting systems.  There is a new focus of moderate to intense FDG uptake within the left adrenal gland with max SUV of 5.3 which is mildly above that of background liver. Unfortunately this area is not well evaluated on noncontrast CT given the orientation of the adrenal gland in this location as well as respiratory motion artifact and a discrete adrenal lesion is not able to be visualized.  There is no abdominopelvic adenopathy demonstrating FDG uptake above that of blood pool.  There are somewhat focal intense FDG uptake within the distal rectum and anus which has been seen since 06/15/2018 concerning max SUV of 18 (previously 10.8 on 06/15/2018 and 15.9 on 07/11/2019). This area is not well evaluated on noncontrast CT but it appears to be thickened, as before. Soft tissue thickening and fat stranding within the presacral soft tissues and mesorectal since 12/27/2019. The appendix is unremarkable. Colonic diverticulosis is present. There is no free intraperitoneal air.  There is FDG uptake throughout the pelvis and proximal femurs. Somewhat more focal FDG uptake is present within the right proximal femoral diaphysis and left hemipelvis which demonstrates a max SUV of 5.2 x 5.3, respectively. No lesion is visualized on noncontrast CT. Mixed lytic and blastic osseous lesions are present within the pelvis and spine, as before. Index lytic and blastic lesion within the right ilium abutting the sacroiliac joint demonstrates a max SUV of 3.2 (previously 4.4).       1.  Interval increase in size of the left upper lobe pulmonary masses with the more inferior perihilar mass also demonstrating increased FDG uptake. The more superior pleural-based lesion within the left apex  demonstrates increased invasion into the subpleural fat and posterior mediastinum with more contact of the T5 and T6 vertebral bodies and apparent extension to the lateral aspect of the left neural foramina. The relationship of this mass with underlying exiting nerve roots can be further evaluated with MRI of the thoracic spine with and without contrast if clinically indicated. 2.  Increased presumed lymphangitic spread of malignancy extending from the left upper lobe masses as above. 3.  Increase in size and FDG uptake of a discrete left upper lobe pulmonary nodule likely representing metastatic disease. 4.  New focus of moderate to intense FDG uptake within the left adrenal gland which is mildly above that of background liver, highly concerning for metastatic disease. However, the location of this lesion is difficult to evaluate on corresponding noncontrast CT given respiratory motion artifact and orientation of the adrenal gland. Findings can be further evaluated with dedicated CT the abdomen with contrast with breath-hold if clinically indicated. 5.  Continued increase in FDG uptake within the distal rectum/anus with apparent thickening in this area. While this finding has been present since 2018, there is new fat stranding and soft tissue thickening within the mesorectum and presacral soft tissues. While this latter finding may be related to underlying proctitis in the appropriate context, constellation of findings remain concerning for underlying neoplasm and correlation with endoscopy should be considered. 6.  Osseous metastatic disease with continued decrease in FDG uptake of index lesion within the right ilium. FDG uptake throughout the pelvis and proximal femurs is heterogenous. However, there are a few areas of asymmetric somewhat focal moderate to intense FDG uptake, primarily within the right proximal femoral diaphysis and left hemipelvis which are nonspecific but raise concern for underlying  metastases. Continued close attention on follow-up is recommended. 7.  Interval decrease in the degree of previously seen left lower lobe pulmonary consolidation and masslike pulmonary nodularity demonstrating moderate to intense FDG uptake as above. The small left pleural effusion is also decreased in size. 8.  Other findings as above  This report was finalized on 4/3/2020 12:13 PM by Dr. Jesus Washington M.D.      Xr Chest Ap    Result Date: 4/3/2020  ONE-VIEW PORTABLE CHEST AT 2:09 PM  HISTORY: Left-sided lung cancer. Cough and fever.  FINDINGS:  There are tumor masses in the left upper lobe medially as best seen on yesterday's PET fusion CT scan. There is also some irregular pleural thickening at the left base extending laterally related to metastatic pleural involvement. There is some chronic parenchymal and pleural scarring at the right base medially unchanged from the chest x-ray dated 10/04/2019. I cannot completely exclude a component of superimposed pneumonia in any of these regions.  The heart remains mildly enlarged with a pacemaker in place.  This report was finalized on 4/3/2020 3:28 PM by Dr. Guilherme Wong M.D.      Results for orders placed during the hospital encounter of 07/13/18   Adult Transthoracic Echo Complete W/ Cont if Necessary Per Protocol    Narrative · Left ventricular systolic function is normal. Calculated EF = 62%.   Estimated EF was in agreement with the calculated EF. Normal left   ventricular cavity size and wall thickness noted. All left ventricular   wall segments contract normally. Left ventricular diastolic function was   indeterminate.  · Estimated right ventricular systolic pressure from tricuspid   regurgitation is mildly elevated (35-45 mmHg). Calculated right   ventricular systolic pressure from tricuspid regurgitation is 44 mmHg.          I reviewed her chest x-ray and compared it to in October 19 study also reviewed the recent for 2/20 PET scan her PET/CT she has an  enlarging left suprahilar mass was present on the PET scan with increased uptake, she has some left lower lobe atelectasis that looks like it is been present for a while and probably not significantly changed.  I cannot say I see a definite acute infiltrate.    Assessment/Plan     1. Dyspnea it is hard to know she is denying this currently and her oxygen saturations are 100% on room air.  She does have a fixed inspiratory and expiratory wheezing I suspect is related to her tumor.  I do not see definite pneumonia I do not see swelling etc. that would suggest possible DVT but she is fully anticoagulated with Eliquis making that much less likely.  I cannot exclude a pneumonia I do not see a definite infiltrate her procalcitonin is in the borderline range.  I guess for right now I would continue azithromycin and maybe add in some Rocephin.  And I agree with trying some steroids, this may be more related to tumor progression.  Reviewing her CT PET she has marked narrowing of her proximal intrathoracic trachea.  I wonder if this is not more of the issue.  She is currently in Covid 19 rule out protocol.  2. Stage IV non-small cell carcinoma of the lung on palliative chemotherapy  3. History of treated limited stage small cell carcinoma lung new history treated breast carcinoma  4. COPD I will give her bronchodilators although this wheezes not a classic obstructive wheeze of COPD this is more of a central process  5. Chronic kidney disease stage III  6. Anemia probably chronic disease related  7. Hyponatremia mild this will need to be followed.  Could be related to her cancer.      Cosmo Albright MD  04/04/20  09:40    Time:

## 2020-04-04 NOTE — H&P
[facility]    Patient Identification:  Name: Hui Workman  Age: 74 y.o.  Sex: female  :  1945  MRN: [id number]         Primary Care Physician: Cornelio Almanzar Jr., MD    Chief Complaint   Patient presents with   • Shortness of Breath     Subjective   Patient is a 74 y.o. female with a history of dementia but is somewhat oriented states she was brought to the hospital because of shortness of breath. I do not trust her history however so this is by chart review. I have left a message for her  to call me back. She has a history of lung cancer on palliative chemo followed by Dr. Almanzar. She herself is not sure of the status of her lung cancer. She has had a few weeks of cough that did not improve with zithromax. She hasn't been eating and is getting weaker. She has had increased shortness of breath and a cough with light brown mucus.  She had a PET scan yesterday. PET scan showed increase STEVAN mass, increase lymphangitic spread, left adrenal new uptake, increased uptake rectum/anus, osseos mets.    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   • Pneumonia 2019   • Psoriasis     Identified in the right heel in .   • Rectal polyp    • Shingles 2012    RIGHT LOW THORACIC    • Small cell lung cancer (CMS/HCC)     Limited stage   • Thyroid nodule     RIGHT   • Wheezing      Past Surgical History:   Procedure Laterality Date   • BREAST IMPLANT SURGERY Right       • BREAST RECONSTRUCTION Left 1999   • BRONCHOSCOPY N/A 7/22/2019    Procedure: BRONCHOSCOPY WITH ENDOBRONCHIAL ULTRASOUND WITH BAL AND BRUSHINGS;  Surgeon: Johnathan Elliott MD;  Location: [facility] ENDOSCOPY;  Service: Pulmonary   • CARDIAC ELECTROPHYSIOLOGY PROCEDURE N/A 3/13/2017    Procedure: Device Implant  DDD pacer  medtronic;  Surgeon: Armani Kennedy MD;  Location: [facility] CATH INVASIVE LOCATION;  Service:    • CATARACT EXTRACTION Bilateral    • COLONOSCOPY     • CYSTOSCOPY BLADDER BIOPSY  05/2012   • EXPLORATORY LAPAROTOMY      Several years ago.   • MASTECTOMY Left 1998   • MEDIPORT INSERTION, SINGLE     • MEDIPORT REMOVAL Right    • PACEMAKER IMPLANTATION       Current medications reviewed.    Family History   Problem Relation Age of Onset   • Heart disease Mother    • Heart attack Mother    • Melanoma Mother 70   • Colon cancer Father 70   • Cancer Father         Abdominal carcinomatosis   • Stroke Neg Hx      Social History     Tobacco Use   • Smoking status: Former Smoker     Start date: 5/26/2011   • Smokeless tobacco: Never Used   • Tobacco comment: quit 6 years ago    //    caffeine use - one soft drink daily    Substance Use Topics   • Alcohol use: No     Comment: Occasional beer, approximately 3 beers every other day.   • Drug use: No     Review of Systems   Unable to perform ROS: Dementia     Objective      Vital Signs  Temp:  [97.8 °F (36.6 °C)-98.7 °F (37.1 °C)] 97.8 °F (36.6 °C)  Heart Rate:  [] 87  Resp:  [18-19] 18  BP: ()/(40-72) 111/72  Body mass index is 28.49 kg/m².    Physical Exam   Constitutional: No distress.   Cardiovascular: Normal rate and regular rhythm.   Pulmonary/Chest: Effort normal. No respiratory distress. She has decreased breath sounds. She has wheezes. She has rhonchi.   Abdominal: Soft. Bowel sounds are normal. There is no tenderness. There is no rebound and no guarding.   Musculoskeletal: She exhibits no edema.   Neurological: She is alert.  "  oriented to self, [employer], year, president. knew she was in hospital due to shortness of breath but does not seem to know specifics. aware of \"virus\" pandemic at least superficially   Skin: Skin is warm and dry.   Nursing note and vitals reviewed.    I examined patient with current PPE recommendations. Eye cover, surgical mask, gown, gloves. Hand hygiene performed. Doffed as instructed. Patient did NOT have mask on.    Results Review:  I reviewed the patient's new clinical results.  I reviewed the patient's new imaging results and agree with the interpretation.  I personally viewed and interpreted the patient's EKG/Telemetry data.    Lab Results   Component Value Date    ANIONGAP 14.1 04/03/2020     Estimated Creatinine Clearance: 29 mL/min (A) (by C-G formula based on SCr of 1.76 mg/dL (H)).    Assessment/Plan   Active Hospital Problems    Diagnosis  POA   • **Shortness of breath [R06.02]  Yes   • Acute bronchitis [J20.9]  Unknown   • Long term current use of anticoagulant therapy [Z79.01]  Not Applicable   • Non-small cell cancer of left lung (CMS/HCC) [C34.92]  Yes   • Bone metastasis (CMS/HCC) [C79.51]  Yes   • Paroxysmal atrial fibrillation (CMS/HCC) [I48.0]  Yes   • Small cell carcinoma of right lung (CMS/HCC) [C34.91]  Yes   • 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     74 y.o. female with advanced lung cancer admitted with shortness of breath, cough, weakness, decreased appetite. treated outpatient with zithromax.    · COVID 19 pending, monitor respiratory status closely. She is currently on room air. Radiologist could not exclude superimposed pneumonia. she has no fever.  · Pulmonology consult assist with treatment for acute bronchitis. continue zithromax. add PO steroids. \"COPD\" listed in past medical history.   · MELANIA/dehydration. she has reportedly not been eating much will give gentle IVF and hold " diuretics.  · PAF: apixaban.   · DVT proph (apixaban she is on)  · Left message with  asking him to call me back- will need to discuss goals of care, code status. She was DNR 5/2019 but was not able to tell me code status today.    · discussed with patient and nursing staff.    Soto Rivas MD  04/03/20  21:29

## 2020-04-05 NOTE — SIGNIFICANT NOTE
I TALKED WITH  AND PT'S BROTHER  MR NAZARIO PHONE [phone] ON THE PHONE AND DR ACOSTA RADIATION ONCOLOGIST TODAY: SHE WILL REVIEW PET, PREVIOUS RADIATION FIELDS AND SHE WILL CALL ME BACK IN REGARD IF THERE IS ANY ROOM FOR RADIATION THAT IS MEANINGFUL IN REGARD SYMPTOMS CONTROL.

## 2020-04-05 NOTE — PLAN OF CARE
Problem: Fall Risk (Adult)  Goal: Absence of Fall  Outcome: Ongoing (interventions implemented as appropriate)  Flowsheets (Taken 4/5/2020 0349)  Absence of Fall: making progress toward outcome     Problem: Patient Care Overview  Goal: Plan of Care Review  Outcome: Ongoing (interventions implemented as appropriate)  Flowsheets (Taken 4/5/2020 0349)  Progress: no change  Plan of Care Reviewed With: patient  Outcome Summary: pt somewhat disoriented. See MAR for pain/nausea treatment.  Goal: Individualization and Mutuality  Outcome: Ongoing (interventions implemented as appropriate)  Goal: Discharge Needs Assessment  Outcome: Ongoing (interventions implemented as appropriate)  Goal: Interprofessional Rounds/Family Conf  Outcome: Ongoing (interventions implemented as appropriate)  Flowsheets (Taken 4/5/2020 0349)  Participants: nursing; patient     Problem: Pain, Chronic (Adult)  Goal: Identify Related Risk Factors and Signs and Symptoms  Outcome: Ongoing (interventions implemented as appropriate)  Flowsheets (Taken 4/5/2020 0349)  Related Risk Factors (Chronic Pain): tumor progression  Signs and Symptoms (Chronic Pain): verbalization of pain descriptors; verbalization of pain/discomfort for a prolonged time period  Goal: Acceptable Pain/Comfort Level and Functional Ability  Outcome: Ongoing (interventions implemented as appropriate)

## 2020-04-05 NOTE — PROGRESS NOTES
Subjective  1. PROGRESSIVE LEFT  LUNG CANCER, CANCER PAIN SEVERE, SOB, COUGH, NO FEVER.BONE METASTASIS     2.TESTED FOR COVID 19 PENDING          History of Present Illness  74-year-old white female who has diagnosis of dementia who has had progressive amount of shortness of breath for the last few days and required admission through the emergency room yesterday. She has had chronic cough associated with her cancer but she has not had any fever. The patient's  and the patient have been on quarantine during the last month or so. In any event the patient had a PET scan on 04/02/2020 that documented substantial progression of her lung cancer on the left side and associated bone metastasis. The patient actually was taking her pain medicine around the clock in the last few days even though she has been very reluctant to do this because she was not ready to modify her behavior in this regard. The pain is located close to the thoracic spine on the left side. She has not had any alteration in her gait or mobility. Her appetite has been declining, she has not had any nausea or vomiting and the cough has not been productive. She has not had any fever. Her bowel activity has been sluggish given the use of narcotic medication. Urination has been normal. She has been able to communicate all of these issues to the patient's .      The patient denies any other issues pertinent to this admission besides discussing the case with the patient's nurse this morning, she has significant pain that is not controlled with Tylenol that she has been receiving.       On 04/05/2020 after discussing the case with the nurse taking care of the patient on the floor she continues having significant degree of pain and the pain medicine has been given to her only on prn basis.    I had a telephone conversation with Cornelio Almanzar MD, this morning, with the patient's  this morning and also I had a personal conversation with Jean  MD Natalee, Pulmonary Medicine this morning, please review below.       Past Medical History, Past Surgical History, Social History, Family History have been reviewed and are without significant changes except as mentioned.  1. ONCOLOGIC HISTORY DR MEHREEN HARVEY MD:Limited stage small cell lung cancer:   · The patient completed concurrent chemoradiation with 6 cycles of cisplatin and -16 in March 2011 and achieved a complete response.   · She did receive PCI completing this in May 2011.    · The patient showed no evidence of recurrent disease.  2. Metastatic non-small cell lung cancer:  · Annual low-dose screening CT scan of the chest.  Follow-up study from 6/6/18 showed new findings with small to moderate right pleural effusion as well as right pleural thickening and potential lymphadenopathy along the right lower lobe bronchovascular bundle although difficult to identify with certainty.    · Suspicion for potential underlying malignancy. PET scan 6/15/18 showed no significant change in the right pleural effusion with low level activity in the right lower lobe bronchovascular bundle SUV of only 3.7.  There was therefore no definitive suspicious hypermetabolic activity to explain the pleural effusion.    · Diagnostic right thoracentesis on 6/20/18 with 250 cc clear yellow fluid removed with LDH 90, pH 7.5, total protein 1.8, appearing transudative with pathology showing no evidence of malignant cells. Etiology of the findings and pleural effusion unclear.  Patient referred back to cardiology and was started by PCP on Lasix 40 mg daily in July 2018.    · CT chest 7/13/18 with no change and negative/low probability V/Q scan 7/16/18.  · Repeat CT 8/17/18 showed near complete resolution of pleural effusions with only a minimal right pleural effusion remaining and some atelectasis/scarring in the right lower lobe.   · Follow-up CT chest 11/30/2018 with probable scar right lower lobe.  · Patient was hospitalized  5/22-5/27/2019 with suspected viral and bacterial pneumonia.  She did have evidence of metapneumovirus on respiratory viral panel.  CT chest 5/23/2019 showed increased consolidation right base consistent with infiltrate.  There was evidence of scattered groundglass opacity (likely the viral component).  There was an enlarging nodule however with irregular margins left upper lobe associated with left hilum worrisome for malignancy.   · PET scan 7/11/2019 showed enlarging hypermetabolic left upper lobe/perihilar mass 2.8 cm (SUV 19) with additional 6 mm subpleural left upper lobe lesion (SUV 6.2), right medial 10th rib/costal vertebral activity (SUV 4.5), right iliac 3.5 cm lesion (SUV 11.8), left iliac activity (SUV 5.3).  Activity in distal rectum and collapsed colon of unclear significance.   · CT head (unable to obtain MRI) negative for metastatic disease on 7/24/2019  · Bronchoscopy/EBUS 7/22/2019 unable to reach perihilar mass.  Left upper lobe brushings/washings negative for malignancy.    · CT-guided biopsy left upper lobe mass 8/1/2019- for malignancy.    · CT-guided biopsy right iliac lesion on 8/14/19 with poorly differentiated carcinoma. PDL1 15%, insufficient material for molecular testing.  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  Will need to discuss possible referral to genetics clinic to help determine if REEMA mutation is germline for benefit of other family members.  · Initiated palliative radiation to right iliac lesion 8/15/2019, completed on 8/29/2019  · Patient is not a candidate for chemotherapy due to comorbidities.  Discussed single agent palliative immunotherapy with Keytruda versus palliative/supportive care with hospice involvement.  Patient elected to pursue Keytruda, initiated treatment 8/30/2019.  · Added Xgeva 9/20/2019, plan to treat every 6 weeks to  coincide with Keytruda schedule.  · PET scan following 3 cycles Keytruda 10/28/2019 with slight increase left upper lobe mass, slight increase left upper lobe pleural lesion, too small left upper lobe nodules, improvement right iliac lesion (was radiated), new small area of activity right acetabulum (no corresponding CT lesion).  Given length of time between previous PET scan and initiation of Keytruda and possibility of some areas representing pseudo-progression along with lack of alternative therapies elected to continue on Keytruda.  · Following 6 cycles Keytruda, PET scan 12/27/19 showed unequivocal evidence of progressive disease with increase in left hilar mass and increase in left pleural-based disease.  The pleural-based mass in the left upper lobe increased, abutting T5, bony disease remained stable with sclerotic appearance.  Keytruda discontinued 1/3/2020.  · Patient felt to be poor candidate again for chemotherapy, discussion regarding potential use of PARP inhibitor due to REEMA mutation.  · Approval for olaparib obtained, plan to initiate treatment 1/31/2020 at reduced dose of 150 mg twice daily due to renal insufficiency and compromised performance status.  · The patient is seen back today in follow-up.  She has remained off of active treatment for her disease since her last visit 1/3/2020 due to progression while on Keytruda.  She actually has done well in the interval with improvement in her performance status, ECOG 2.  She has been more active, ambulating more with the use of her walker and able to get in the pool while in [place].  In the interval I contacted Dr. Forde at [place] and discussed the patient's molecular profile in regards to potential efficacy of olaparib in the setting of her REEMA mutation.  As discussed previously she is not a good candidate for palliative single agent chemotherapy given her frail status, ECOG performance status. Olaparib would represent a potentially better  tolerated treatment for her.  Unfortunately there is limited data regarding the use of PARP inhibitors in the setting of REEMA mutations.  Data and prostate cancer is not favorable.  There is very little data and lung cancer.  Nevertheless, treatment does make sense on a molecular level.  We were able to get approval for the drug through the patient's insurance.  I discussed with the patient and her family today the possible use of olaparib at a reduced dose of 150 mg twice daily.  Dose reduction is pursued due to her underlying renal insufficiency and performance status.  We did discuss side effects of therapy today and patient was able to undergo chemo education with nurse practitioner.  The patient does wish to proceed.  She will be returning to [place] tomorrow and will not be back for 1 month.  She should be receiving the medication in 1 week and will therefore begin treatment around 1/31/2020.  Nurse practitioners will contact the patient on a weekly basis to assess her tolerance while she is out of town.  I will see her back in 1 month to represent 3 weeks on treatment to assess her status.  We did discuss potential repeat scans after 2 months on treatment.  We discussed alternative of palliative/supportive care alone however the patient as above does wish to proceed with treatment.  She is due for monthly Xgeva today which we will administer and she will be due again when she returns here in 1 month.  The patient is scheduled to be seen in the genetics clinic on 2/18/2020 to discuss whether REEMA mutation may represent a germline mutation in relation to potential need for testing of additional family members.  3. Cancer related pain:   · Previous significant pain due to metastatic lesion in the right iliac bone  · Completed palliative radiation right iliac lesion 8/29/2019  · Improvement in right iliac pain following radiation.  · Patient continues with hydrocodone 5/325 as needed, typically requiring  approximately 2 doses per day.  4. Narcotic induced constipation:  · Currently controlled with Senokot 2 tablets twice daily  5. History of stage I breast cancer on the left:   · The patient underwent a left mastectomy and completed 5 years of tamoxifen in July 2003.    · Exam on 6/8/2019 was negative.    · Annual right mammogram 9/5/18 was negative, BI-RADS 1.    · We are not pursuing further routine mammography due to patient's metastatic lung cancer.  6. 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 This does affect her balance; it is unchanged.   7. Stage III chronic kidney disease:   · Baseline creatinine in the 1.1-1.2 range.  · Creatinine today 1.29  8. Atrial fibrillation:   · Pacemaker placement 3/13/17 after having a syncopal episode.  · Patient continuing on Eliquis 5 mg twice daily.   9. Mild dementia:   · Short-term memory is very poor.  This has been an ongoing issue for her.  Her scans do show significant progression of small vessel ischemic change.  It is felt that some of this has been precipitated by her previous PCI.  10. Sigmoid colon uptake on PET scan:   · PET scan 6/15/2018 did show activity in the distal sigmoid colon/rectum and the patient was referred back to her gastroenterologist Dr. Colton Watkins.    · He did perform a flexible sigmoidoscopy which was negative on 8/2/18 and likely the activity represents muscular activity in the bowel wall.    · Subsequent PET scan 7/11/2019 showed continued activity in the distal rectum SUV 15.9.  There was also activity throughout the colon, possibly suggestive of inflammation/colitis.    · Patient relatively asymptomatic from GI standpoint.    · Patient was not felt to require any additional endoscopic evaluation at this point.  · PET scan 10/28/2019 and also 12/27/2019 with unchanged activity in the distal rectum.  11. Recurrent epistaxis:  · Patient is continuing on Eliquis  for atrial fibrillation, 5 mg twice daily  · Patient developed recurrent epistaxis unclear whether unilateral or bilateral.  · Patient referred to Dr. Dos Santos in ENT, refused cauterization  · Symptoms currently resolved  12. Immunotherapy induced skin rash:  · Mild grade 1 immunotherapy induced skin rash developed with macular erythematous lesions forearms and upper chest at end of cycle 3 Keytruda  · Prescribed topical hydrocortisone cream 2.5% twice daily on 11/1/2019.    · Skin rash stable to improved today.    Review of Systems     I discussed the situation of the patient with the nurse who is taking care of her including that every time that she walks into the room the patient complains of pain. Her pain medicine is given to her on a prn basis. I am going to modify this to be on a standing dose of pain medicine 3-4 times a day.     In regard to her breathing, it remains about the same. I had a conversation with Jean Albright MD, about her situation this morning, please review below.         Medications:  The current medication list was reviewed in the EMR    ALLERGIES:    Allergies   Allergen Reactions   • Moxifloxacin Nausea Only     Passed out after taking       Objective      Vitals:    04/04/20 2046 04/04/20 2300 04/05/20 0745 04/05/20 0803   BP: 134/74 126/63  129/76   BP Location: Right arm Right arm  Right arm   Patient Position: Sitting Lying  Lying   Pulse: 107 103 103 94   Resp: 18 18 20 20   Temp: 96.4 °F (35.8 °C) 97.6 °F (36.4 °C)  97.3 °F (36.3 °C)   TempSrc: Oral Oral  Oral   SpO2:  99% 96% 97%   Weight:       Height:         Current Status 2/21/2020   ECOG score 1       Physical Exam  None done covid ui    RECENT LABS:  Hematology WBC   Date Value Ref Range Status   04/04/2020 12.77 (H) 3.40 - 10.80 10*3/mm3 Final     RBC   Date Value Ref Range Status   04/04/2020 3.15 (L) 3.77 - 5.28 10*6/mm3 Final     Hemoglobin   Date Value Ref Range Status   04/04/2020 9.4 (L) 12.0 - 15.9 g/dL Final      Hematocrit   Date Value Ref Range Status   04/04/2020 28.9 (L) 34.0 - 46.6 % Final     Platelets   Date Value Ref Range Status   04/04/2020 312 140 - 450 10*3/mm3 Final          Assessment/Plan   patient has had lung cancer on the left side nonsmall cell kind that she has had for a while and being treated with Keytruda as a single agent. The patient has been admitted to the hospital with in crescendo pain located in the thoracic spine and associated with her cancer. The PET scan is very clear in the limited activity of the tumor not only related to the lung per se but also related close to areas of the spine and the pain is not surprising under the present circumstances. The PET scan also documents areas of abnormal uptake in the skeleton and there is an area of abnormal uptake in the adrenal gland as well. The liver is clear. The right lung is clear.      I think the shortness of breath is associated with her cancer and associated probably with COPD. I do not see anything that would suggest cardiac manifestations of Keytruda side effect at this time.      I also have reviewed the laboratory parameters on this patient and they remain more or less stable in comparison with previous assessment with the exception of a bump in the creatinine to 1.7 yesterday that is improving today. The patient received saline solution in the emergency room 500 cc.      The patient has been taking Zithromax at home prescribed by my partner Cornelio Almanzar MD, and she will complete this medicine very soon.      I do not think the patient has pneumonia or anything pertinent to COVID-19. The patient's  has been perfectly healthy and they have been in quarantine for the last month or so.     The viral respiratory panel was negative.     I have personally reviewed the PET scan pictures and compared this with the previous PET scan done in December. I agree with the radiologists interpretation.      As I mentioned I have talked with the  nurse taking care of the patient on the floor and I have talked with the patient's , phone number [phone].     I reviewed on 04/05/2020 with Jean Albright MD, the clinical situation. He strongly believes that most of the shortness of breath is related to tracheal compromise by the tumor and the question will be if the patient will be able to handle a bronchoscopy and a stent or spot radiation treatment versus just symptom control.     I discussed these facts with Cornelio Almanzar MD, this morning who is leading toward belief that the patient should be palliative care more than anything else. She has had radiation therapy to the chest before and we are not sure that the patient given her dementia, pain and anxiety will be able to hold still on a table to receive meaningful radiation therapy.    In our experiences stents in this anatomical location have been painful and difficult to handle and they do not prolong too much of time or provide major quality improvement overall.     The patient's  wants for Cornelio Almanzar MD, to discuss the case with the patient's brother Landon Harris and he will provide us a telephone number at some point today. Otherwise I will modify her pain medication regimen to be given to her around the clock and I will start her on a low dose Xanax for anxiety, hopefully this will not trigger a different effect on her especially with dementia.     The patient's case will be discussed tonight with Alonso Jean MD, who will take over the patient's care tomorrow.        ADDENDUM: SEE SIGNIFICANT EVENT NOTE.                4/5/2020      CC:

## 2020-04-05 NOTE — PLAN OF CARE
Problem: Fall Risk (Adult)  Goal: Absence of Fall  Outcome: Ongoing (interventions implemented as appropriate)  Flowsheets (Taken 4/5/2020 1526)  Absence of Fall: making progress toward outcome  Note:   Pt is disoriented to situation. Pt has complained about pain all over. MD scheduled Norco every 6hours. Pt uses the bathroom frequently to [facility] [place] room air, VSS, will continue to monitor.      Problem: Patient Care Overview  Goal: Plan of Care Review  Outcome: Ongoing (interventions implemented as appropriate)  Goal: Individualization and Mutuality  Outcome: Ongoing (interventions implemented as appropriate)  Goal: Discharge Needs Assessment  Outcome: Ongoing (interventions implemented as appropriate)  Goal: Interprofessional Rounds/Family Conf  Outcome: Ongoing (interventions implemented as appropriate)     Problem: Pain, Chronic (Adult)  Goal: Identify Related Risk Factors and Signs and Symptoms  Outcome: Ongoing (interventions implemented as appropriate)  Goal: Acceptable Pain/Comfort Level and Functional Ability  Outcome: Ongoing (interventions implemented as appropriate)

## 2020-04-05 NOTE — PROGRESS NOTES
Name: Hiu Workman ADMIT: 4/3/2020   : 1945  PCP: Cornelio Almanzar Jr., MD    MRN: [id number] LOS: 2 days   AGE/SEX: 74 y.o. female  ROOM: [facility]   Subjective   Chief Complaint   Patient presents with   • Shortness of Breath      Dyspnea is about the same. Mostly nonproductive cough. No CP NVD reported by patient but had loose stool per nursing report yesterday.    Objective   Vital Signs  Temp:  [96.4 °F (35.8 °C)-97.6 °F (36.4 °C)] 97.3 °F (36.3 °C)  Heart Rate:  [] 94  Resp:  [18-20] 20  BP: (126-134)/(63-76) 129/76  SpO2:  [96 %-100 %] 97 %  on   ;   Device (Oxygen Therapy): room air  Body mass index is 28.49 kg/m².    Physical Exam   Constitutional: She appears well-developed. No distress.   HENT:   Head: Atraumatic.   Nose: Nose normal.   Eyes: Conjunctivae and EOM are normal.   Neck: Neck supple. No tracheal deviation present.   Cardiovascular: Normal rate, regular rhythm and intact distal pulses.   Pulmonary/Chest: Effort normal. She has wheezes.   Abdominal: Soft. She exhibits no distension. There is no tenderness. There is no rebound and no guarding.   Musculoskeletal: She exhibits no edema or tenderness.   Neurological: She is alert. No cranial nerve deficit.   Skin: Skin is warm and dry. She is not diaphoretic.   Psychiatric: She has a normal mood and affect. Her behavior is normal.   Nursing note and vitals reviewed.      Results Review:       I reviewed the patient's new clinical results.    Results from last 7 days   Lab Units 20  0849 20  0612 20  1417   WBC 10*3/mm3 15.52* 12.77* 12.04*   HEMOGLOBIN g/dL 9.5* 9.4* 9.4*   PLATELETS 10*3/mm3 370 312 338     Results from last 7 days   Lab Units 20  0849 20  0612 20  1418   SODIUM mmol/L 132* 133* 134*   POTASSIUM mmol/L 4.0 4.2 4.3   CHLORIDE mmol/L 98 100 95*   CO2 mmol/L 16.7* 20.4* 24.9   BUN mg/dL 17 20 23   CREATININE mg/dL 1.08* 1.24* 1.76*   GLUCOSE mg/dL 148* 157* 113*   Estimated Creatinine  Clearance: 47.3 mL/min (A) (by C-G formula based on SCr of 1.08 mg/dL (H)).  Results from last 7 days   Lab Units 04/05/20  0849 04/04/20  0612 04/03/20  1418   CALCIUM mg/dL 8.4* 8.0* 8.5*   ALBUMIN g/dL  --   --  3.70         apixaban 5 mg Oral Q12H   budesonide-formoterol 2 puff Inhalation BID - RT   cefTRIAXone 1 g Intravenous Q24H   guaiFENesin 600 mg Oral Q12H   HYDROcodone-acetaminophen 1 tablet Oral Q6H   metoprolol tartrate 25 mg Oral BID   predniSONE 40 mg Oral Daily   tiotropium bromide monohydrate 2 puff Inhalation Daily - RT      Diet Regular    Assessment/Plan      Active Hospital Problems    Diagnosis  POA   • **Shortness of breath [R06.02]  Yes   • Acute bronchitis [J20.9]  Unknown   • Long term current use of anticoagulant therapy [Z79.01]  Not Applicable   • Non-small cell cancer of left lung (CMS/HCC) [C34.92]  Yes   • Bone metastasis (CMS/HCC) [C79.51]  Yes   • Paroxysmal atrial fibrillation (CMS/HCC) [I48.0]  Yes   • Small cell carcinoma of right lung (CMS/HCC) [C34.91]  Yes   • 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       · SOA: Agree that cancer with possible copd exacerbation are etiologies of her symptoms. Still with wheezes but improved air movement overall today. Continue steroid and abx. Azithromycin stopped due to loose stool, will start probiotic. Pulmonology following.  · COVID 19 testing pending  · Metastatic NSCLC: Oncology following.  · COPD  · PAF: On eliquis, metoprolol.  · MELANIA: Cr continues to improve off diuretic. Can resume the bumex if needed from respiratory standpoint. Otherwise may resume it tomorrow based on renal function. BP has remained stable.  · Disposition: NIDHI Virgen MD  [facility]  04/05/20  10:43    Dictated portions using Dragon dictation software.

## 2020-04-05 NOTE — PROGRESS NOTES
LOS: 2 days   Patient Care Team:  Cornelio Almanzar Jr., MD as PCP - General (Hematology and Oncology)  Cornelio Almanzar Jr., MD as Consulting Physician (Hematology and Oncology)  Anitha Haynes MD as Referring Physician (Internal Medicine)  Mónica Gilbert MD as Consulting Physician (Radiation Oncology)    Subjective     Patient currently denies pain says her only shortness of breath is when she gets up and exerts.  She does have some cough she says is just 1 paroxysmal day the nurse indicates probably otherwise she is not producing significant sputum.  Patient denies diarrhea the nurse notes she had multiple loose stools yesterday no abdominal pain.  No chest pain.  No sore throat.    Review of Systems:         Objective     Vital Signs  Vital Sign Min/Max for last 24 hours  Temp  Min: 96.4 °F (35.8 °C)  Max: 97.6 °F (36.4 °C)   BP  Min: 124/69  Max: 134/74   Pulse  Min: 87  Max: 107   Resp  Min: 18  Max: 20   SpO2  Min: 96 %  Max: 100 %   No data recorded   No data recorded        Ventilator/Non-Invasive Ventilation Settings (From admission, onward)    None                       Body mass index is 28.49 kg/m².  I/O last 3 completed shifts:  In: -   Out: 300 [Urine:300]  No intake/output data recorded.        Physical Exam:    General Appearance: Well-developed white female she is resting comfortably in bed on room air her oxygen saturations 97% she does not appear in any acute distress.  She is very pleasant she is quite confused however  Eyes: Conjunctiva are clear and anicteric pupils are equal  ENT: Mucous membranes are moist no erythema no exudates  Neck: No adenopathy or thyromegaly no jugular venous tension trachea midline  Lungs: She has a sort of fixed inspiratory and expiratory central type wheeze that in the more mid chest but she can sort of hear it radiated up and down.  I do not hear rales or rhonchi.  There may be minimal dullness to percussion in the left base not much.  I would say  the wheezing may be slightly less prominent than yesterday  Cardiac: Regular rate rhythm no murmur no gallop  Abdomen: Soft no palpable hepatosplenomegaly or masses  : Not examined  Musculoskeletal: Grossly normal  Skin: No jaundice no petechiae skin is warm and dry  Neuro; she is awake she is alert she is moving all extremities she is following commands  Extremities/P Vascular: No clubbing no cyanosis no edema palpable radial and dorsalis pedis pulses bilaterally  MSE: Seems to be in good spirits     Labs:  Results from last 7 days   Lab Units 04/04/20  0612 04/03/20  1418   GLUCOSE mg/dL 157* 113*   SODIUM mmol/L 133* 134*   POTASSIUM mmol/L 4.2 4.3   CO2 mmol/L 20.4* 24.9   CHLORIDE mmol/L 100 95*   ANION GAP mmol/L 12.6 14.1   CREATININE mg/dL 1.24* 1.76*   BUN mg/dL 20 23   BUN / CREAT RATIO  16.1 13.1   CALCIUM mg/dL 8.0* 8.5*   EGFR IF NONAFRICN AM mL/min/1.73 42* 28*   ALK PHOS U/L  --  76   TOTAL PROTEIN g/dL  --  6.6   ALT (SGPT) U/L  --  6   AST (SGOT) U/L  --  6   BILIRUBIN mg/dL  --  0.3   ALBUMIN g/dL  --  3.70   GLOBULIN gm/dL  --  2.9     Estimated Creatinine Clearance: 41.2 mL/min (A) (by C-G formula based on SCr of 1.24 mg/dL (H)).      Results from last 7 days   Lab Units 04/04/20  0612 04/03/20  1417   WBC 10*3/mm3 12.77* 12.04*   RBC 10*6/mm3 3.15* 3.18*   HEMOGLOBIN g/dL 9.4* 9.4*   HEMATOCRIT % 28.9* 29.3*   MCV fL 91.7 92.1   MCH pg 29.8 29.6   MCHC g/dL 32.5 32.1   RDW % 19.0* 19.1*   RDW-SD fl 63.4* 63.6*   MPV fL 9.3 9.2   PLATELETS 10*3/mm3 312 338   NEUTROPHIL % %  --  81.6*   LYMPHOCYTE % %  --  6.3*   MONOCYTES % %  --  7.1   EOSINOPHIL % %  --  3.7   BASOPHIL % %  --  0.4   IMM GRAN % %  --  0.9*   NEUTROS ABS 10*3/mm3  --  9.82*   LYMPHS ABS 10*3/mm3  --  0.76   MONOS ABS 10*3/mm3  --  0.85   EOS ABS 10*3/mm3  --  0.45*   BASOS ABS 10*3/mm3  --  0.05   IMMATURE GRANS (ABS) 10*3/mm3  --  0.11*   NRBC /100 WBC  --  0.0         Results from last 7 days   Lab Units 04/03/20  5730      TROPONIN T ng/mL 0.013         Results from last 7 days   Lab Units 04/04/20  0612   TSH uIU/mL 0.539     Results from last 7 days   Lab Units 04/03/20  1418 04/03/20  1417   LACTATE mmol/L  --  1.4   PROCALCITONIN ng/mL 0.28*  --          Microbiology Results (last 10 days)     Procedure Component Value - Date/Time    S. Pneumo Ag Urine or CSF - Urine, Urine, Clean Catch [[id number]]  (Normal) Collected:  04/04/20 1623    Lab Status:  Final result Specimen:  Urine, Clean Catch Updated:  04/04/20 1658     Strep Pneumo Ag Negative    Legionella Antigen, Urine - Urine, Urine, Clean Catch [[id number]]  (Normal) Collected:  04/04/20 1623    Lab Status:  Final result Specimen:  Urine, Clean Catch Updated:  04/04/20 1658     LEGIONELLA ANTIGEN, URINE Negative    Blood Culture - Blood, Hand, Right [[id number]] Collected:  04/03/20 1445    Lab Status:  Preliminary result Specimen:  Blood from Hand, Right Updated:  04/04/20 1500     Blood Culture No growth at 24 hours    Blood Culture - Blood, Arm, Right [[id number]] Collected:  04/03/20 1418    Lab Status:  Preliminary result Specimen:  Blood from Arm, Right Updated:  04/04/20 1445     Blood Culture No growth at 24 hours    Respiratory Panel, PCR - Swab, Nasopharynx [[id number]]  (Normal) Collected:  04/03/20 1416    Lab Status:  Final result Specimen:  Swab from Nasopharynx Updated:  04/03/20 1546     ADENOVIRUS, PCR Not Detected     Coronavirus 229E Not Detected     Coronavirus HKU1 Not Detected     Coronavirus NL63 Not Detected     Coronavirus OC43 Not Detected     Human Metapneumovirus Not Detected     Human Rhinovirus/Enterovirus Not Detected     Influenza B PCR Not Detected     Parainfluenza Virus 1 Not Detected     Parainfluenza Virus 2 Not Detected     Parainfluenza Virus 3 Not Detected     Parainfluenza Virus 4 Not Detected     Bordetella pertussis pcr Not Detected     Influenza A H1 2009 PCR Not Detected     Chlamydophila pneumoniae PCR Not Detected     Mycoplasma  pneumo by PCR Not Detected     Influenza A PCR Not Detected     Influenza A H3 Not Detected     Influenza A H1 Not Detected     RSV, PCR Not Detected     Bordetella parapertussis PCR Not Detected    Narrative:       The coronavirus on the RVP is NOT COVID-19 and is NOT indicative of infection with COVID-19.                 apixaban 5 mg Oral Q12H   budesonide-formoterol 2 puff Inhalation BID - RT   cefTRIAXone 1 g Intravenous Q24H   guaiFENesin 600 mg Oral Q12H   metoprolol tartrate 25 mg Oral BID   predniSONE 40 mg Oral Daily   tiotropium bromide monohydrate 2 puff Inhalation Daily - RT          Diagnostics:  Nm Pet Skull Base To Mid Thigh    Result Date: 4/3/2020  F-18 FDG PET FROM SKULL BASE TO MID THIGH WITH PET/CT FUSION  HISTORY: Lung cancer, restaging  TECHNIQUE: Radiation dose reduction techniques were utilized, including automated exposure control and exposure modulation based on body size. Blood glucose level at time of injection was 109 mg/dL.  6.4 mCi of F-18 FDG were injected and PET was performed from skull base to mid thigh. CT was obtained for localization and attenuation correction. Time at injection 0838. PET start time 1022.  Compared with PET/CT dating back to 10/28/2019  FINDINGS:  There is no cervical adenopathy demonstrating FDG uptake above that of blood pool.  The thyroid, submandibular and parotid glands demonstrate roughly symmetric FDG uptake.  There is no mediastinal or axillary adenopathy demonstrating FDG uptake significantly above that of blood pool.  Bilateral breast implants present with findings of intracapsular implant rupture. There is a left-sided pacemaker.  The heart is normal in size. Asymmetric moderate to intense FDG uptake is present within the right chest wall musculature. There is asymmetric hypertrophy of the right chest wall musculature on noncontrast CT, as before. Findings are favored to be physiologic  Mild-to-moderate emphysema is present. Atelectasis extends from  the right hilum through the right lower lobe, as seen since 10/28/2019.  Pleural-based intensely FDG avid left upper lobe mass has increased in size, measuring 3.8 x 3.4 cm ( previously 2.8 x 3.5 cm).. Demonstrates a max SUV of 23.4 (previously 25). The lesion also demonstrates increased soft tissue extension medially and posteriorly into the subpleural fat with more contact over the left aspect of the T5 vertebral body and abutting the lateral aspect of the left neuroforamen as well. Groundglass opacification within the left apex has increased overall, some areas of which are somewhat nodular. Pulmonary mass more inferiorly within the perihilar aspect of the left upper lobe has also increased in size, measuring 5.4 x 4.3 cm (previously 4.2 x 3.7 cm) and demonstrates intense, primarily peripheral FDG uptake with a max SUV of 31.5 (previously 24). Irregular interlobular septal thickening, pulmonary and groundglass opacification extends from the periphery of these masses with a few tiny surrounding satellite nodules, as before, but appears to have increased in extent, primarily along the superomedial aspect of the left apical pleural-based mass.   A pulmonary nodule abutting the the pleura along the posterior aspect of the left upper lobe measures 0.8 x 1.3 cm (previously up to 0.7 cm and demonstrates a max SUV of 12.8 (previously 3.6).  There has been interval improvement in the degree of previously seen left lower lobe pulmonary consolidation since 12/27/2019. The small left pleural effusion is also decreased in size. Pulmonary opacification within the periphery of the left lower lobe continues to demonstrate moderate to intense FDG uptake max SUV of 5. Focal nodular area of pulmonary opacification within the posterior aspect of the left lower lobe measures 1.5 x 0.9 m and has decreased in size since 12/27/2019, previously measuring 2.3 x 1.4 cm. It demonstrates a max SUV of 5.2 (previously 6.4).  There is no  suspicious focal FDG avid lesion within the liver, gallbladder, pancreas, spleen or kidneys. Asymmetric atrophy of the right kidney is present, as before. There is no hydronephrosis. Incidental note is made of bilateral dependent collecting systems.  There is a new focus of moderate to intense FDG uptake within the left adrenal gland with max SUV of 5.3 which is mildly above that of background liver. Unfortunately this area is not well evaluated on noncontrast CT given the orientation of the adrenal gland in this location as well as respiratory motion artifact and a discrete adrenal lesion is not able to be visualized.  There is no abdominopelvic adenopathy demonstrating FDG uptake above that of blood pool.  There are somewhat focal intense FDG uptake within the distal rectum and anus which has been seen since 06/15/2018 concerning max SUV of 18 (previously 10.8 on 06/15/2018 and 15.9 on 07/11/2019). This area is not well evaluated on noncontrast CT but it appears to be thickened, as before. Soft tissue thickening and fat stranding within the presacral soft tissues and mesorectal since 12/27/2019. The appendix is unremarkable. Colonic diverticulosis is present. There is no free intraperitoneal air.  There is FDG uptake throughout the pelvis and proximal femurs. Somewhat more focal FDG uptake is present within the right proximal femoral diaphysis and left hemipelvis which demonstrates a max SUV of 5.2 x 5.3, respectively. No lesion is visualized on noncontrast CT. Mixed lytic and blastic osseous lesions are present within the pelvis and spine, as before. Index lytic and blastic lesion within the right ilium abutting the sacroiliac joint demonstrates a max SUV of 3.2 (previously 4.4).       1.  Interval increase in size of the left upper lobe pulmonary masses with the more inferior perihilar mass also demonstrating increased FDG uptake. The more superior pleural-based lesion within the left apex demonstrates increased  invasion into the subpleural fat and posterior mediastinum with more contact of the T5 and T6 vertebral bodies and apparent extension to the lateral aspect of the left neural foramina. The relationship of this mass with underlying exiting nerve roots can be further evaluated with MRI of the thoracic spine with and without contrast if clinically indicated. 2.  Increased presumed lymphangitic spread of malignancy extending from the left upper lobe masses as above. 3.  Increase in size and FDG uptake of a discrete left upper lobe pulmonary nodule likely representing metastatic disease. 4.  New focus of moderate to intense FDG uptake within the left adrenal gland which is mildly above that of background liver, highly concerning for metastatic disease. However, the location of this lesion is difficult to evaluate on corresponding noncontrast CT given respiratory motion artifact and orientation of the adrenal gland. Findings can be further evaluated with dedicated CT the abdomen with contrast with breath-hold if clinically indicated. 5.  Continued increase in FDG uptake within the distal rectum/anus with apparent thickening in this area. While this finding has been present since 2018, there is new fat stranding and soft tissue thickening within the mesorectum and presacral soft tissues. While this latter finding may be related to underlying proctitis in the appropriate context, constellation of findings remain concerning for underlying neoplasm and correlation with endoscopy should be considered. 6.  Osseous metastatic disease with continued decrease in FDG uptake of index lesion within the right ilium. FDG uptake throughout the pelvis and proximal femurs is heterogenous. However, there are a few areas of asymmetric somewhat focal moderate to intense FDG uptake, primarily within the right proximal femoral diaphysis and left hemipelvis which are nonspecific but raise concern for underlying metastases. Continued close  attention on follow-up is recommended. 7.  Interval decrease in the degree of previously seen left lower lobe pulmonary consolidation and masslike pulmonary nodularity demonstrating moderate to intense FDG uptake as above. The small left pleural effusion is also decreased in size. 8.  Other findings as above  This report was finalized on 4/3/2020 12:13 PM by Dr. Jesus Washington M.D.      Xr Chest Ap    Result Date: 4/3/2020  ONE-VIEW PORTABLE CHEST AT 2:09 PM  HISTORY: Left-sided lung cancer. Cough and fever.  FINDINGS:  There are tumor masses in the left upper lobe medially as best seen on yesterday's PET fusion CT scan. There is also some irregular pleural thickening at the left base extending laterally related to metastatic pleural involvement. There is some chronic parenchymal and pleural scarring at the right base medially unchanged from the chest x-ray dated 10/04/2019. I cannot completely exclude a component of superimposed pneumonia in any of these regions.  The heart remains mildly enlarged with a pacemaker in place.  This report was finalized on 4/3/2020 3:28 PM by Dr. Guilherme Wong M.D.      Results for orders placed during the hospital encounter of 07/13/18   Adult Transthoracic Echo Complete W/ Cont if Necessary Per Protocol    Narrative · Left ventricular systolic function is normal. Calculated EF = 62%.   Estimated EF was in agreement with the calculated EF. Normal left   ventricular cavity size and wall thickness noted. All left ventricular   wall segments contract normally. Left ventricular diastolic function was   indeterminate.  · Estimated right ventricular systolic pressure from tricuspid   regurgitation is mildly elevated (35-45 mmHg). Calculated right   ventricular systolic pressure from tricuspid regurgitation is 44 mmHg.              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         Assessment/Plan     1. Dyspnea primarily on exertion with good oxygen saturation.  She does have a fixed inspiratory and expiratory wheezing I suspect is related to her tumor.  I do not see definite pneumonia ,I do not see swelling etc. that would suggest possible DVT and she is fully anticoagulated with Eliquis making that much less likely.  I cannot exclude a pneumonia I do not see a definite infiltrate her procalcitonin is in the borderline range.  I guess for right now I would continue  Rocephin, her atypical pneumonias were negative DC'd azithromycin with her loose stools..  And I agree with trying some steroids, this may be more related to tumor progression.  Reviewing her CT PET she has marked narrowing of her proximal intrathoracic trachea.  I wonder if this is not more of the issue.  I did review with Dr. Casanova if patient and family are insistent on treatment he would consider bronchoscopy once Covid 19 is ruled out, if stenosis is significant he could try and dilate and only if he could dilate which he will be a candidate for stent.  This may be awfully aggressive given her prognosis. She is currently in Covid 19 rule out protocol.  2. Stage IV non-small cell carcinoma of the lung on palliative chemotherapy  3. History of treated limited stage small cell carcinoma lung   4. history treated breast carcinoma  5. COPD I will give her bronchodilators although this wheezes not a classic obstructive wheeze of COPD this is more of a central process  6. Chronic kidney disease stage III  7. Anemia  probably chronic disease related  8. Hyponatremia mild this will need to be followed.  Could be related to her cancer.  Days labs pending phlebotomy was in the room collecting her first specimen clotted.    Plan for disposition: Address CODE STATUS and goals of care with family    Cosmo Albright MD  04/05/20  07:07    Time:

## 2020-04-06 NOTE — TELEPHONE ENCOUNTER
Patient's  calling.    Left message for Juanita last week when Dr. Almanzar was out.    He believes he left a callback number that is his wife's phone.  He asks that that number please not be called since it would upset his wife.      In the meantime, things have progressed for the worse.    If anyone tries to call him, call his cell phone number    [phone]

## 2020-04-06 NOTE — PLAN OF CARE
Problem: Patient Care Overview  Goal: Plan of Care Review  Outcome: Ongoing (interventions implemented as appropriate)  Flowsheets (Taken 4/6/2020 4750)  Progress: no change  Plan of Care Reviewed With: patient  Outcome Summary: VSS, 1L O2, pt with wheezing and rhonchi - newly ordered breathing treatment given per respiratory prior to bedtime with success per pt - still with rhonchi but no s/s of distress or sob.  Scheduled pain medication with Tylenol and Xanax in between.  Pt confused at times and can be impulsive - bed alarm on at all times.  X1 assist to [facility].  would like Dr. Almanzar to call pt brothyao Navarrete regarding plan - left sticky note. Called Inpatient radiation oncology consult. Will continue to monitor.

## 2020-04-06 NOTE — PROGRESS NOTES
[facility] INPATIENT PROGRESS NOTE    Length of Stay:  3 days    CHIEF COMPLAINT/REASON FOR VISIT:  Metastatic non-small cell lung cancer    SUBJECTIVE: She states that she is feeling better.  She states her breathing is better.  She is sitting on the side of the bed eating lunch.  I did speak with her brother today as well who states that she is still having some pain but she did not verbalize this to me at the bedside today.    ROS:  Positive for -shortness of breath, improving  Negative for -fevers, bleeding    OBJECTIVE:  Vitals:    04/05/20 2259 04/05/20 2312 04/05/20 2317 04/06/20 0746   BP: 134/78   129/74   BP Location: Right arm   Right arm   Patient Position: Lying   Sitting   Pulse: 78 117 78 90   Resp: 20 18 20   Temp: 97.6 °F (36.4 °C)   98 °F (36.7 °C)   TempSrc: Oral   Oral   SpO2: 98% 97% 100% 96%   Weight:       Height:             PHYSICAL EXAMINATION:  General: Awake and alert.  Conversant.  Sitting on side of bed.  Eating lunch.    DIAGNOSTIC DATA:  Results Review:     I reviewed the patient's new clinical results.    Results from last 7 days   Lab Units 04/06/20  0436   WBC 10*3/mm3 12.93*   HEMOGLOBIN g/dL 8.4*   HEMATOCRIT % 25.8*   PLATELETS 10*3/mm3 333     Lab Results   Component Value Date    NEUTROABS 10.62 (H) 04/06/2020     Results from last 7 days   Lab Units 04/06/20  0436   SODIUM mmol/L 133*   POTASSIUM mmol/L 4.6   CHLORIDE mmol/L 101   CO2 mmol/L 18.7*   BUN mg/dL 18   CREATININE mg/dL 0.99   GLUCOSE mg/dL 117*   CALCIUM mg/dL 8.4*                 IMAGING:    PET scan April 2, 2020 with evidence for progression with an increase in the left upper lobe pulmonary mass.  Images personally reviewed.    Assessment/Plan   ASSESSMENT/PLAN:  This is a 74 y.o. female with:     1. Limited stage small cell lung cancer:   · The patient completed concurrent chemoradiation with 6 cycles of cisplatin and -16 in March 2011 and achieved a complete response.   · She did receive PCI  completing this in May 2011.    · The patient showed no evidence of recurrent disease.  2. Metastatic non-small cell lung cancer:  · Annual low-dose screening CT scan of the chest.  Follow-up study from 6/6/18 showed new findings with small to moderate right pleural effusion as well as right pleural thickening and potential lymphadenopathy along the right lower lobe bronchovascular bundle although difficult to identify with certainty.    · Suspicion for potential underlying malignancy. PET scan 6/15/18 showed no significant change in the right pleural effusion with low level activity in the right lower lobe bronchovascular bundle SUV of only 3.7.  There was therefore no definitive suspicious hypermetabolic activity to explain the pleural effusion.    · Diagnostic right thoracentesis on 6/20/18 with 250 cc clear yellow fluid removed with LDH 90, pH 7.5, total protein 1.8, appearing transudative with pathology showing no evidence of malignant cells. Etiology of the findings and pleural effusion unclear.  Patient referred back to cardiology and was started by PCP on Lasix 40 mg daily in July 2018.    · CT chest 7/13/18 with no change and negative/low probability V/Q scan 7/16/18.  · Repeat CT 8/17/18 showed near complete resolution of pleural effusions with only a minimal right pleural effusion remaining and some atelectasis/scarring in the right lower lobe.   · Follow-up CT chest 11/30/2018 with probable scar right lower lobe.  · Patient was hospitalized 5/22-5/27/2019 with suspected viral and bacterial pneumonia.  She did have evidence of metapneumovirus on respiratory viral panel.  CT chest 5/23/2019 showed increased consolidation right base consistent with infiltrate.  There was evidence of scattered groundglass opacity (likely the viral component).  There was an enlarging nodule however with irregular margins left upper lobe associated with left hilum worrisome for malignancy.   · PET scan 7/11/2019 showed  enlarging hypermetabolic left upper lobe/perihilar mass 2.8 cm (SUV 19) with additional 6 mm subpleural left upper lobe lesion (SUV 6.2), right medial 10th rib/costal vertebral activity (SUV 4.5), right iliac 3.5 cm lesion (SUV 11.8), left iliac activity (SUV 5.3).  Activity in distal rectum and collapsed colon of unclear significance.   · CT head (unable to obtain MRI) negative for metastatic disease on 7/24/2019  · Bronchoscopy/EBUS 7/22/2019 unable to reach perihilar mass.  Left upper lobe brushings/washings negative for malignancy.    · CT-guided biopsy left upper lobe mass 8/1/2019- for malignancy.    · CT-guided biopsy right iliac lesion on 8/14/19 with poorly differentiated carcinoma. PDL1 15%, insufficient material for molecular testing.  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  Will need to discuss possible referral to genetics clinic to help determine if REEMA mutation is germline for benefit of other family members.  · Initiated palliative radiation to right iliac lesion 8/15/2019, completed on 8/29/2019  · Patient is not a candidate for chemotherapy due to comorbidities.  Discussed single agent palliative immunotherapy with Keytruda versus palliative/supportive care with hospice involvement.  Patient elected to pursue Keytruda, initiated treatment 8/30/2019.  · Added Xgeva 9/20/2019, plan to treat every 6 weeks to coincide with Keytruda schedule.  · PET scan following 3 cycles Keytruda 10/28/2019 with slight increase left upper lobe mass, slight increase left upper lobe pleural lesion, too small left upper lobe nodules, improvement right iliac lesion (was radiated), new small area of activity right acetabulum (no corresponding CT lesion).  Given length of time between previous PET scan and initiation of Keytruda and possibility of some areas representing pseudo-progression  along with lack of alternative therapies elected to continue on Keytruda.  · Following 6 cycles Keytruda, PET scan 12/27/19 showed unequivocal evidence of progressive disease with increase in left hilar mass and increase in left pleural-based disease.  The pleural-based mass in the left upper lobe increased, abutting T5, bony disease remained stable with sclerotic appearance.  Keytruda discontinued 1/3/2020.  · Patient felt to be poor candidate again for chemotherapy, discussion regarding potential use of PARP inhibitor due to REEMA mutation.  · Approval for olaparib obtained, initiated treatment 2/1/2020 at reduced dose of 150 mg twice daily due to renal insufficiency and compromised performance status.  · PET scan April 2, 2020 with evidence for progression.  · No more systemic treatment options per Dr. Almanzar.  3. Cancer related pain:   · Previous significant pain due to metastatic lesion in the right iliac bone  · Completed palliative radiation right iliac lesion 8/29/2019  · Improvement in right iliac pain following radiation.  · Patient continued as an outpatient with hydrocodone 5/325 as needed, typically requiring approximately 2 doses per day.  · Now on scheduled hydrocodone/acetaminophen 10/325 mg tablets every 6 hours  4. Narcotic induced constipation:  · Currently controlled with Senokot 2 tablets twice daily  5. History of stage I breast cancer on the left:   · The patient underwent a left mastectomy and completed 5 years of tamoxifen in July 2003.    · Exam on 6/8/2019 was negative.    · Annual right mammogram 9/5/18 was negative, BI-RADS 1.    · We are not pursuing further routine mammography due to patient's metastatic lung cancer.  6. 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 Stage III chronic kidney disease:   · Baseline creatinine in the 1.1-1.2 range.  8. Atrial fibrillation:   · Pacemaker placement 3/13/17 after having a syncopal episode.  · Patient continues on Eliquis 5 mg twice daily.    9. Mild dementia:   · Short-term memory is very poor.  This has been an ongoing issue for her.  Her scans do show significant progression of small vessel ischemic change.  It is felt that some of this has been precipitated by her previous PCI.  10. Sigmoid colon uptake on PET scan:   · PET scan 6/15/2018 did show activity in the distal sigmoid colon/rectum and the patient was referred back to her gastroenterologist Dr. Colton Watkins.    · He did perform a flexible sigmoidoscopy which was negative on 8/2/18 and likely the activity represents muscular activity in the bowel wall.    · Subsequent PET scan 7/11/2019 showed continued activity in the distal rectum SUV 15.9.  There was also activity throughout the colon, possibly suggestive of inflammation/colitis.    · Patient relatively asymptomatic from GI standpoint.    · Patient was not felt to require any additional endoscopic evaluation at this point.  · PET scan 10/28/2019 and also 12/27/2019 with unchanged activity in the distal rectum.  · PET scan April 2, 2020 with persistent FDG uptake in the distal rectum and anus with some new fat stranding and soft tissue thickening within the mesorectum and presacral soft tissues of unclear significance  11. Recurrent epistaxis:  · Patient is continuing on Eliquis for atrial fibrillation, 5 mg twice daily  · Patient developed recurrent epistaxis unclear whether unilateral or bilateral.  · Patient referred to Dr. Dos Santos in ENT, refused cauterization  · Symptoms currently resolved  12. Immunotherapy induced skin rash:  · Mild grade 1 immunotherapy induced skin rash developed with macular erythematous lesions forearms and upper chest at end of cycle 3 Keytruda  · Prescribed topical hydrocortisone cream 2.5% twice daily on 11/1/2019.    · Skin rash improved.    13. Shortness of breath:   · COVID-19 negative.    · Pulmonary medicine following.    · Perhaps there is tracheal compromise from the tumor.    · No obvious pneumonia or  infiltrate.    · She continues Rocephin.  Azithromycin discontinued.    · We do not think that a stent is appropriate.  · She is on prednisone 40 mg daily per pulmonary medicine     PLAN:      1. Continue to hold olaparib.  No further systemic treatment for her lung cancer.  2. Eliquis 5 mg every 12 hours continues  3. She continues ceftriaxone per pulmonology and the primary team  4. She continues prednisone 40 mg daily per pulmonology  5. She continues hydrocodone/acetaminophen 1 tablet every 6 hours for pain.  This is scheduled.  Will address her pain as apparently this may not be adequately controlled.  6. We have discussed the situation with Dr. [place] with radiation oncology.  It would be a difficult treatment plan to pursue radiation to this area and it would require a lot of cooperation from the patient which she is unlikely to be able to provide at this point.  7. I discussed her situation with her brother Landon Harris.  I explained that no more systemic treatment is possible and further radiation would be quite difficult.  He is in agreement with no further treatment.  He states that she will be resistant to hospice.  I will bring it up gently to her today and perhaps may need to get our palliative care team involved for assistance as well.    I spent 35 minutes today reviewing her chart and communicating with her brother and other physicians including Dr. Bardales, Dr. Almanzar and Dr. Gilbert today.         Alonso Jean MD

## 2020-04-06 NOTE — PROGRESS NOTES
Discharge Planning Assessment  [facility]     Patient Name: Hui Workman  MRN: [id number]  Today's Date: 4/6/2020    Admit Date: 4/3/2020    Discharge Needs Assessment     Row Name 04/06/20 1428       Living Environment    Lives With  spouse    Name(s) of Who Lives With Patient  Dirk Workman/Spouse    Current Living Arrangements  home/apartment/condo    Primary Care Provided by  spouse/significant other    Provides Primary Care For  no one, unable/limited ability to care for self    Family Caregiver if Needed  none    Quality of Family Relationships  supportive    Able to Return to Prior Arrangements  yes       Resource/Environmental Concerns    Resource/Environmental Concerns  none    Transportation Concerns  car, none       Transition Planning    Patient/Family Anticipates Transition to  home with family    Patient/Family Anticipated Services at Transition  none    Transportation Anticipated  family or friend will provide       Discharge Needs Assessment    Readmission Within the Last 30 Days  no previous admission in last 30 days    Concerns to be Addressed  discharge planning    Equipment Currently Used at Home  cane, straight;nebulizer;lift device;wheelchair;walker, rolling    Anticipated Changes Related to Illness  none    Equipment Needed After Discharge  other (see comments) Watching for oxygen needs.    Provided Post Acute Provider List?  N/A    N/A Provider List Comment  No needs identified at this time.        Discharge Plan     Row Name 04/06/20 1434       Plan    Plan  Home with family    Patient/Family in Agreement with Plan  yes    Plan Comments  Facesheet verified, and CCP role explained. Patient lives in a one-story house with her  who assists her with all ADLs. She uses a chair lift, wheelchair, rolling walker, straight cane, and nebulizer. She has never used home health, and has been to [facility] in the past. She uses the [facility] on [address] [facility] in [facility]  and is interested in enrolling in the [facility] Bed Program. She plans to discharge home with her  who will transport her by car. Patient is to have a walking oximetry eval - watching for home oxygen need. CCP will follow. --POWER Briceño        Destination      Coordination has not been started for this encounter.      Durable Medical Equipment      Coordination has not been started for this encounter.      Dialysis/Infusion      Coordination has not been started for this encounter.      Home Medical Care      Coordination has not been started for this encounter.      Therapy      Coordination has not been started for this encounter.      Community Resources      Coordination has not been started for this encounter.          Demographic Summary     Row Name 04/06/20 1424       General Information    Admission Type  inpatient    Required Notices Provided  Important Message from Medicare    Reason for Consult  discharge planning    Preferred Language  English     Used During This Interaction  no       Contact Information    Permission Granted to Share Info With  ;family/designee        Functional Status     Row Name 04/06/20 1424       Functional Status    Usual Activity Tolerance  fair    Current Activity Tolerance  fair    Functional Status Comments   helps patient with all ADLs       Functional Status, IADL    Medications  assistive person    Meal Preparation  assistive person    Housekeeping  assistive person    Laundry  assistive person    Shopping  assistive person       Mental Status    General Appearance WDL  WDL       Mental Status Summary    Recent Changes in Mental Status/Cognitive Functioning  no changes       Employment/    Employment Status  retired        Psychosocial     Row Name 04/06/20 1428       Values/Beliefs    Spiritual, Cultural Beliefs, Yazidism Practices, Values that Affect Care  no       Behavior WDL    Behavior WDL  WDL        Emotion Mood WDL    Emotion/Mood/Affect WDL  WDL       Speech WDL    Speech WDL  WDL       Perceptual State WDL    Perceptual State WDL  WDL       Thought Process WDL    Thought Process WDL  WDL       Intellectual Performance WDL    Intellectual Performance WDL  WDL       Coping/Stress    Patient Personal Strengths  able to adapt    Sources of Support  spouse    Reaction to Health Status  accepting    Understanding of Condition and Treatment  adequate understanding of medical condition       Developmental Stage (Eriksson's)    Developmental Stage  Stage 8 (65 years-death/Late Adulthood) Integrity vs. Despair       C-SSRS (Screen-Recent) Past Month    Q1 Wished to be Dead (Past Month)  no    Q2 Suicidal Thoughts (Past Month)  no    Q6 Suicide Behavior (Lifetime)  no    Level of Risk per Screen  screen negative       Violence Risk    Feels Like Hurting Others  no    Previous Attempt to Harm Others  no        Abuse/Neglect     Row Name 04/06/20 1423       Personal Safety    Feels Unsafe at Home or Work/School  no    Feels Threatened by Someone  no    Does Anyone Try to Keep You From Having Contact with Others or Doing Things Outside Your Home?  no    Physical Signs of Abuse Present  no        Legal    No documentation.       Substance Abuse     Row Name 04/06/20 1428       AUDIT-C (Alcohol Use Disorders ID Test)    Alcohol Use In Past Year  0-->never    Alcohol Amount Per Day In Past Year  0-->none    More Than 6 Drinks On One Occasion  0-->never    Total AUDIT-C Score  0        Patient Forms    No documentation.           Yoly Newby RN

## 2020-04-06 NOTE — PROGRESS NOTES
[facility]     Mar/chart reviewed  Follow up lung cancer, wheezing, shortness of breath  She says she feels improved today, still some cough and wheezing    Vital Sign Min/Max for last 24 hours  Temp  Min: 97.6 °F (36.4 °C)  Max: 98.3 °F (36.8 °C)   BP  Min: 114/87  Max: 139/79   Pulse  Min: 78  Max: 118   Resp  Min: 18  Max: 22   SpO2  Min: 96 %  Max: 100 %   Flow (L/min)  Min: 1  Max: 2   No data recorded     Appears ill, axox3,   perrl, eomi, no icterus, normal conjunctiva  mmm, no jvd, trachea midline, neck supple, no palpable thyroid nodules  chest fair ae, rhonchi bilaterally, no crackles, some wheezes but does not appear to be lower airway,   rrr,   soft, nt, nd +bs,  no c/c/ e  Skin warm, dry no rashes    Labs: 4/6/: reviewed:  Glucose 117  Bun 18  Cr 0.99  n1 33  Wbc 12.93  hgb 8.4  plts 333    A/P:  1. Shortness of breath -- she seems to think it is much improved. I agree with trying further radiation first if possible. I would reserve endobronchial laser or stenting if her symptoms continue to be bothersome, would have to be setup as outpatient likely  2. Stage IV NSCLC -- onc exploring further options  3. COPD -- continue steroids -- wean slowly. Add flutter valve, continue bronchodilators.  4. Anemia    Patient is new to me today

## 2020-04-06 NOTE — PROGRESS NOTES
Name: Hui Workman ADMIT: 4/3/2020   : 1945  PCP: Cornelio Almanzar Jr., MD    MRN: [id number] LOS: 3 days   AGE/SEX: 74 y.o. female  ROOM: [facility]   Subjective   Chief Complaint   Patient presents with   • Shortness of Breath      Breathing is better today, not moving around much though.     denies chest pain, palpitations, SOA at rest, edema, fever, chills, nausea and vomiting.     Objective   Vital Signs  Temp:  [97.5 °F (36.4 °C)-98.3 °F (36.8 °C)] 97.5 °F (36.4 °C)  Heart Rate:  [] 69  Resp:  [18-22] 20  BP: (109-139)/(66-87) 109/66  SpO2:  [94 %-100 %] 94 %  on  Flow (L/min):  [1-2] 1;   Device (Oxygen Therapy): nasal cannula  Body mass index is 28.49 kg/m².    Physical Exam   Constitutional: She appears well-developed. No distress.   HENT:   Head: Atraumatic.   Nose: Nose normal.   Eyes: Conjunctivae and EOM are normal.   Neck: Neck supple. No tracheal deviation present.   Cardiovascular: Normal rate, regular rhythm and intact distal pulses.   Pulmonary/Chest: Effort normal. She has wheezes. She has rhonchi.   Abdominal: Soft. She exhibits no distension. There is no tenderness. There is no rebound and no guarding.   Musculoskeletal: She exhibits no edema or tenderness.   Neurological: She is alert. No cranial nerve deficit.   Skin: Skin is warm and dry. She is not diaphoretic.   Psychiatric: She has a normal mood and affect. Her behavior is normal.   Nursing note and vitals reviewed.      Results Review:       I reviewed the patient's new clinical results.    Results from last 7 days   Lab Units 20  0436 20  0849 20  0612 20  1417   WBC 10*3/mm3 12.93* 15.52* 12.77* 12.04*   HEMOGLOBIN g/dL 8.4* 9.5* 9.4* 9.4*   PLATELETS 10*3/mm3 333 370 312 338     Results from last 7 days   Lab Units 20  0436 20  0849 20  0612 20  1418   SODIUM mmol/L 133* 132* 133* 134*   POTASSIUM mmol/L 4.6 4.0 4.2 4.3   CHLORIDE mmol/L 101 98 100 95*   CO2 mmol/L 18.7*  16.7* 20.4* 24.9   BUN mg/dL 18 17 20 23   CREATININE mg/dL 0.99 1.08* 1.24* 1.76*   GLUCOSE mg/dL 117* 148* 157* 113*   Estimated Creatinine Clearance: 51.6 mL/min (by C-G formula based on SCr of 0.99 mg/dL).  Results from last 7 days   Lab Units 04/06/20  0436 04/05/20  0849 04/04/20  0612 04/03/20  1418   CALCIUM mg/dL 8.4* 8.4* 8.0* 8.5*   ALBUMIN g/dL  --   --   --  3.70         apixaban 5 mg Oral Q12H   budesonide-formoterol 2 puff Inhalation BID - RT   cefTRIAXone 1 g Intravenous Q24H   guaiFENesin 600 mg Oral Q12H   HYDROcodone-acetaminophen 1 tablet Oral Q6H   ipratropium-albuterol 3 mL Nebulization Q4H While Awake - RT   lactobacillus acidophilus 1 capsule Oral Daily   metoprolol tartrate 25 mg Oral BID   predniSONE 40 mg Oral Daily   tiotropium bromide monohydrate 2 puff Inhalation Daily - RT      Diet Regular    Assessment/Plan      Active Hospital Problems    Diagnosis  POA   • **Shortness of breath [R06.02]  Yes   • Acute bronchitis [J20.9]  Unknown   • Long term current use of anticoagulant therapy [Z79.01]  Not Applicable   • Non-small cell cancer of left lung (CMS/HCC) [C34.92]  Yes   • Bone metastasis (CMS/HCC) [C79.51]  Yes   • Paroxysmal atrial fibrillation (CMS/HCC) [I48.0]  Yes   • Small cell carcinoma of right lung (CMS/HCC) [C34.91]  Yes   • 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       · SOA: Agree that cancer with possible copd exacerbation are etiologies of her symptoms. Still with rhonchi and some wheezes but compared to previous notes seems to be improving. Her oxygen was turned off during our interaction and o2 sats were remaining in the mid to upper 90s.. Continue steroid and abx. Azithromycin stopped due to loose stool, will start probiotic. Pulmonology following. will get a walking ox prior to discharge.   · COVID 19 testing NEGATIVE  · Metastatic NSCLC: Oncology following.  · COPD  · PAF: On  eliquis, metoprolol.  · MELANIA: Cr continues to improve off diuretic. Will resume bumex today and monitor renal function. BP has remained stable.  · Disposition: YEFRI Verduzco  [facility]  04/06/20  11:20    Dictated portions using Dragon dictation software.

## 2020-04-06 NOTE — PLAN OF CARE
Problem: Fall Risk (Adult)  Goal: Absence of Fall  Outcome: Ongoing (interventions implemented as appropriate)  Flowsheets (Taken 4/6/2020 1714)  Absence of Fall: making progress toward outcome     Problem: Patient Care Overview  Goal: Plan of Care Review  Outcome: Ongoing (interventions implemented as appropriate)  Flowsheets (Taken 4/6/2020 1714)  Progress: improving  Plan of Care Reviewed With: patient  Outcome Summary: VSS.  Atrial fib vs NSR on monitor with pac and pvc occasionally.  Taking PO food and fluids well.  Room air.  Conts to have rhonchi and exp wheezes.  Up to [facility] with one assist et dyspneic upon exertion.  Will continue to monitor.     Problem: Pain, Chronic (Adult)  Goal: Identify Related Risk Factors and Signs and Symptoms  Outcome: Ongoing (interventions implemented as appropriate)  Flowsheets (Taken 4/6/2020 1714)  Related Risk Factors (Chronic Pain): disease process; tumor progression

## 2020-04-07 PROBLEM — J44.1 COPD WITH ACUTE EXACERBATION (HCC): Status: ACTIVE | Noted: 2020-01-01

## 2020-04-07 PROBLEM — D72.829 LEUKOCYTOSIS: Status: ACTIVE | Noted: 2020-01-01

## 2020-04-07 NOTE — PROGRESS NOTES
[facility] GROUP INPATIENT PROGRESS NOTE    Length of Stay:  4 days    CHIEF COMPLAINT/REASON FOR VISIT:  Metastatic non-small cell lung cancer    SUBJECTIVE: Yesterday when I visited her she did not complain of pain by her later for her brother that she was continuing to have pain.  When I went back to see her she was sound asleep.  Today she complains of pain across the mid back.  She states that the pain medicine she is on does help but it takes a while for it to help and she rates her pain 9 out of 10 right now.  She denies much shortness of breath or cough.  She did not note any hemoptysis today.    ROS:  Positive for -shortness of breath, improving.  Pain across her mid back.  Negative for -fevers, bleeding    OBJECTIVE:  Vitals:    04/06/20 2258 04/06/20 2300 04/07/20 0700 04/07/20 0807   BP:  121/81 152/69    BP Location:  Right arm Right arm    Patient Position:  Lying Lying    Pulse: 78 97 82 93   Resp: 20 20 18 18   Temp:  97.8 °F (36.6 °C) 97.6 °F (36.4 °C)    TempSrc:  Oral Oral    SpO2: 93% 91% 92% 93%   Weight:       Height:             PHYSICAL EXAMINATION:  General: Awake and alert.  Conversant.  Sitting in a chair.  Coarse expiratory rhonchi and wheezing are present in her lungs bilaterally.  Heart is regular.  Abdomen is soft and nontender.  Extremities are warm and well-perfused.    DIAGNOSTIC DATA:  Results Review:     I reviewed the patient's new clinical results.    Results from last 7 days   Lab Units 04/07/20  0435   WBC 10*3/mm3 15.08*   HEMOGLOBIN g/dL 8.8*   HEMATOCRIT % 26.4*   PLATELETS 10*3/mm3 291     Lab Results   Component Value Date    NEUTROABS 12.70 (H) 04/07/2020     Results from last 7 days   Lab Units 04/07/20  0435   SODIUM mmol/L 135*   POTASSIUM mmol/L 4.9   CHLORIDE mmol/L 101   CO2 mmol/L 18.7*   BUN mg/dL 18   CREATININE mg/dL 0.91   GLUCOSE mg/dL 138*   CALCIUM mg/dL 9.0                 IMAGING:    PET scan April 2, 2020 with evidence for progression with an  increase in the left upper lobe pulmonary mass.    Assessment/Plan   ASSESSMENT/PLAN:  This is a 74 y.o. female with:     1. History of limited stage small cell lung cancer:   · The patient completed concurrent chemoradiation with 6 cycles of cisplatin and -16 in March 2011 and achieved a complete response.   · She did receive PCI completing this in May 2011.    · The patient showed no evidence of recurrent disease.  2. Metastatic non-small cell lung cancer:  · Annual low-dose screening CT scan of the chest.  Follow-up study from 6/6/18 showed new findings with small to moderate right pleural effusion as well as right pleural thickening and potential lymphadenopathy along the right lower lobe bronchovascular bundle although difficult to identify with certainty.    · Suspicion for potential underlying malignancy. PET scan 6/15/18 showed no significant change in the right pleural effusion with low level activity in the right lower lobe bronchovascular bundle SUV of only 3.7.  There was therefore no definitive suspicious hypermetabolic activity to explain the pleural effusion.    · Diagnostic right thoracentesis on 6/20/18 with 250 cc clear yellow fluid removed with LDH 90, pH 7.5, total protein 1.8, appearing transudative with pathology showing no evidence of malignant cells. Etiology of the findings and pleural effusion unclear.  Patient referred back to cardiology and was started by PCP on Lasix 40 mg daily in July 2018.    · CT chest 7/13/18 with no change and negative/low probability V/Q scan 7/16/18.  · Repeat CT 8/17/18 showed near complete resolution of pleural effusions with only a minimal right pleural effusion remaining and some atelectasis/scarring in the right lower lobe.   · Follow-up CT chest 11/30/2018 with probable scar right lower lobe.  · Patient was hospitalized 5/22-5/27/2019 with suspected viral and bacterial pneumonia.  She did have evidence of metapneumovirus on respiratory viral panel.  CT  chest 5/23/2019 showed increased consolidation right base consistent with infiltrate.  There was evidence of scattered groundglass opacity (likely the viral component).  There was an enlarging nodule however with irregular margins left upper lobe associated with left hilum worrisome for malignancy.   · PET scan 7/11/2019 showed enlarging hypermetabolic left upper lobe/perihilar mass 2.8 cm (SUV 19) with additional 6 mm subpleural left upper lobe lesion (SUV 6.2), right medial 10th rib/costal vertebral activity (SUV 4.5), right iliac 3.5 cm lesion (SUV 11.8), left iliac activity (SUV 5.3).  Activity in distal rectum and collapsed colon of unclear significance.   · CT head (unable to obtain MRI) negative for metastatic disease on 7/24/2019  · Bronchoscopy/EBUS 7/22/2019 unable to reach perihilar mass.  Left upper lobe brushings/washings negative for malignancy.    · CT-guided biopsy left upper lobe mass 8/1/2019- for malignancy.    · CT-guided biopsy right iliac lesion on 8/14/19 with poorly differentiated carcinoma. PDL1 15%, insufficient material for molecular testing.  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  Will need to discuss possible referral to genetics clinic to help determine if REEMA mutation is germline for benefit of other family members.  · Initiated palliative radiation to right iliac lesion 8/15/2019, completed on 8/29/2019  · Patient is not a candidate for chemotherapy due to comorbidities.  Discussed single agent palliative immunotherapy with Keytruda versus palliative/supportive care with hospice involvement.  Patient elected to pursue Keytruda, initiated treatment 8/30/2019.  · Added Xgeva 9/20/2019, plan to treat every 6 weeks to coincide with Keytruda schedule.  · PET scan following 3 cycles Keytruda 10/28/2019 with slight increase left upper lobe mass, slight  increase left upper lobe pleural lesion, too small left upper lobe nodules, improvement right iliac lesion (was radiated), new small area of activity right acetabulum (no corresponding CT lesion).  Given length of time between previous PET scan and initiation of Keytruda and possibility of some areas representing pseudo-progression along with lack of alternative therapies elected to continue on Keytruda.  · Following 6 cycles Keytruda, PET scan 12/27/19 showed unequivocal evidence of progressive disease with increase in left hilar mass and increase in left pleural-based disease.  The pleural-based mass in the left upper lobe increased, abutting T5, bony disease remained stable with sclerotic appearance.  Keytruda discontinued 1/3/2020.  · Patient felt to be poor candidate again for chemotherapy, discussion regarding potential use of PARP inhibitor due to REEMA mutation.  · Approval for olaparib obtained, initiated treatment 2/1/2020 at reduced dose of 150 mg twice daily due to renal insufficiency and compromised performance status.  · PET scan April 2, 2020 with evidence for progression.  · No more systemic treatment options per Dr. Almanzar.  3. Cancer related pain:   · Previous significant pain due to metastatic lesion in the right iliac bone  · Completed palliative radiation right iliac lesion 8/29/2019  · Improvement in right iliac pain following radiation.  · Patient continued as an outpatient with hydrocodone 5/325 as needed, typically requiring approximately 2 doses per day.  · Now on scheduled hydrocodone/acetaminophen 10/325 mg tablets every 6 hours.  She states that these are not helping as much as she would like.  · Start fentanyl patch 25 mcg every 72 hours  4. Narcotic induced constipation:  · Currently controlled with Senokot 2 tablets twice daily  5. History of stage I breast cancer on the left:   · The patient underwent a left mastectomy and completed 5 years of tamoxifen in July 2003.    · Exam on  6/8/2019 was negative.    · Annual right mammogram 9/5/18 was negative, BI-RADS 1.    · We are not pursuing further routine mammography due to patient's metastatic lung cancer.  6. 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 Stage III chronic kidney disease:   · Baseline creatinine in the 1.1-1.2 range.  8. Atrial fibrillation:   · Pacemaker placement 3/13/17 after having a syncopal episode.  · Patient continues on Eliquis 5 mg twice daily.   9. Mild dementia:   · Short-term memory is very poor.  This has been an ongoing issue for her.  Her scans do show significant progression of small vessel ischemic change.  It is felt that some of this has been precipitated by her previous PCI.  10. Sigmoid colon uptake on PET scan:   · PET scan 6/15/2018 did show activity in the distal sigmoid colon/rectum and the patient was referred back to her gastroenterologist Dr. Colton Watkins.    · He did perform a flexible sigmoidoscopy which was negative on 8/2/18 and likely the activity represents muscular activity in the bowel wall.    · Subsequent PET scan 7/11/2019 showed continued activity in the distal rectum SUV 15.9.  There was also activity throughout the colon, possibly suggestive of inflammation/colitis.    · Patient relatively asymptomatic from GI standpoint.    · Patient was not felt to require any additional endoscopic evaluation at this point.  · PET scan 10/28/2019 and also 12/27/2019 with unchanged activity in the distal rectum.  · PET scan April 2, 2020 with persistent FDG uptake in the distal rectum and anus with some new fat stranding and soft tissue thickening within the mesorectum and presacral soft tissues of unclear significance  11. Recurrent epistaxis:  · Patient is continuing on Eliquis for atrial fibrillation, 5 mg twice daily  · Patient developed recurrent epistaxis unclear whether unilateral or bilateral.  · Patient referred to Dr. Dos Santos in ENT, refused cauterization  · Not currently an  issue  12. Immunotherapy induced skin rash:  · Mild grade 1 immunotherapy induced skin rash developed with macular erythematous lesions forearms and upper chest at end of cycle 3 Keytruda  · Prescribed topical hydrocortisone cream 2.5% twice daily on 11/1/2019.    · Skin rash improved.    13. Shortness of breath:   · COVID-19 negative.    · Pulmonary medicine following.    · Perhaps there is tracheal compromise from the tumor.    · No obvious pneumonia or infiltrate.    · She continues Rocephin.  Azithromycin discontinued.    · We do not think that a stent is appropriate.  · She is on prednisone 40 mg daily per pulmonary medicine     PLAN:   1. Continue to hold olaparib.  No further systemic treatment for her lung cancer.  2. Eliquis 5 mg every 12 hours continues  3. She continues ceftriaxone per pulmonology and the primary team  4. She continues prednisone 40 mg daily per pulmonology with plans to taper noted  5. She continues hydrocodone/acetaminophen 1 tablet every 6 hours for pain.  This is scheduled.  6. Add fentanyl patch 12 mcg every 72 hours for her pain  7. We have discussed the situation with Dr. Gilbert with radiation oncology.  It would be a difficult treatment plan to pursue radiation to this area and it would require a lot of cooperation from the patient which she is unlikely to be able to provide at this point.  There is a pacemaker overlying the field which adds an additional layer of complexity as well.  Therefore, further radiation is likely not a viable option under the circumstances.  8. I discussed her situation with the patient at length today at the bedside and I also discussed the situation at length with her brother Landon Harris again today by telephone.  I explained that no more systemic treatment is possible and further radiation would be quite difficult.  He is in agreement with no further treatment.  As he told me yesterday, she was initially resistant to the idea of hospice today.  I  explained to her that they are focused on improving her quality of life and prolonging her life as much as possible.  She notes some friends that had hospice that quickly became sedated and passed.  She does not want this to happen to her.  Ultimately, once I explained the goals of hospice, she was agreeable to having a conversation with them.  Hospice consult placed.  9. It is noted that her CODE STATUS is still CPR.  I did not discuss this with her or her brother today but this should ultimately be changed to no CPR under the circumstances.    Today, I added a fentanyl 12 mcg patch for her pain and I consulted hospice.  Hopeful for discharge in the near future.         Alonso Jean MD

## 2020-04-07 NOTE — PLAN OF CARE
Problem: Patient Care Overview  Goal: Plan of Care Review  Outcome: Ongoing (interventions implemented as appropriate)  Flowsheets (Taken 4/7/2020 1142)  Progress: improving  Plan of Care Reviewed With: patient  Outcome Summary: cont w NSR, stated feeling better but still w pain, medicated per mar.  lungs remain very corse rhonchi and exp. wheezes.  will cont to monitor

## 2020-04-07 NOTE — PLAN OF CARE
Problem: Fall Risk (Adult)  Goal: Absence of Fall  Outcome: Ongoing (interventions implemented as appropriate)  Flowsheets (Taken 4/6/2020 1714 by Maria Isabel Valente, RN)  Absence of Fall: making progress toward outcome     Problem: Patient Care Overview  Goal: Plan of Care Review  Outcome: Ongoing (interventions implemented as appropriate)  Flowsheets  Taken 4/7/2020 1714 by Thea Gipson, RN  Progress: improving  Outcome Summary: VSS. Pain much improved since Fentanyl patch applied.  Will need home oxygen with activity.  [place] to see tomorrow and possible d/c  Taken 4/7/2020 0456 by Hui Linda RN  Plan of Care Reviewed With: patient  Goal: Individualization and Mutuality  Outcome: Ongoing (interventions implemented as appropriate)     Problem: Pain, Chronic (Adult)  Goal: Identify Related Risk Factors and Signs and Symptoms  Outcome: Ongoing (interventions implemented as appropriate)  Flowsheets  Taken 4/6/2020 1714 by Maria Isabel Valente, RN  Related Risk Factors (Chronic Pain): disease process;tumor progression  Taken 4/5/2020 0349 by Kalpana Sánchez, RN  Signs and Symptoms (Chronic Pain): verbalization of pain descriptors;verbalization of pain/discomfort for a prolonged time period

## 2020-04-07 NOTE — NURSING NOTE
Patient walked a total of 150 feet around the nurses station.  Within the first two minutes her oxygen saturations dropped to 88%, she was put on 2L and oxygen only went up to 90%. At 3L her oxygen saturations maintained 93%.  HR in low 100's.

## 2020-04-07 NOTE — PROGRESS NOTES
Name: Hui Workman ADMIT: 4/3/2020   : 1945  PCP: Cornelio Almanzar Jr., MD    MRN: [id number] LOS: 4 days   AGE/SEX: 74 y.o. female  ROOM: [facility]   Subjective   Chief Complaint   Patient presents with   • Shortness of Breath      Has some complaints of back pain, otherwise feeling better, breathing is the same and she has been weaned to RA.     denies chest pain, palpitations, SOA at rest, edema, fever, chills, nausea and vomiting.     Objective   Vital Signs  Temp:  [97.5 °F (36.4 °C)-97.9 °F (36.6 °C)] 97.6 °F (36.4 °C)  Heart Rate:  [74-98] 98  Resp:  [18-22] 18  BP: (110-152)/(60-81) 141/77  SpO2:  [91 %-98 %] 98 %  on   ;   Device (Oxygen Therapy): room air  Body mass index is 28.49 kg/m².    Physical Exam   Constitutional: She appears well-developed. No distress.   HENT:   Head: Atraumatic.   Nose: Nose normal.   Eyes: Conjunctivae and EOM are normal.   Neck: Neck supple. No tracheal deviation present.   Cardiovascular: Normal rate, regular rhythm and intact distal pulses.   Pulmonary/Chest: Effort normal. She has wheezes. She has rhonchi.   Abdominal: Soft. She exhibits no distension. There is no tenderness. There is no rebound and no guarding.   Musculoskeletal: She exhibits no edema or tenderness.   Neurological: She is alert. No cranial nerve deficit.   Skin: Skin is warm and dry. She is not diaphoretic.   Psychiatric: She has a normal mood and affect. Her behavior is normal.   Nursing note and vitals reviewed.      Results Review:       I reviewed the patient's new clinical results.    Results from last 7 days   Lab Units 20  04320  04320  0849 20  0612   WBC 10*3/mm3 15.08* 12.93* 15.52* 12.77*   HEMOGLOBIN g/dL 8.8* 8.4* 9.5* 9.4*   PLATELETS 10*3/mm3 291 333 370 312     Results from last 7 days   Lab Units 20  0435 20  0436 20  0849 20  0612   SODIUM mmol/L 135* 133* 132* 133*   POTASSIUM mmol/L 4.9 4.6 4.0 4.2   CHLORIDE mmol/L 101 101 98  100   CO2 mmol/L 18.7* 18.7* 16.7* 20.4*   BUN mg/dL 18 18 17 20   CREATININE mg/dL 0.91 0.99 1.08* 1.24*   GLUCOSE mg/dL 138* 117* 148* 157*   Estimated Creatinine Clearance: 56.1 mL/min (by C-G formula based on SCr of 0.91 mg/dL).  Results from last 7 days   Lab Units 04/07/20  0435 04/06/20  0436 04/05/20  0849 04/04/20  0612 04/03/20  1418   CALCIUM mg/dL 9.0 8.4* 8.4* 8.0* 8.5*   ALBUMIN g/dL  --   --   --   --  3.70         apixaban 5 mg Oral Q12H   budesonide-formoterol 2 puff Inhalation BID - RT   bumetanide 2 mg Oral Daily   cefTRIAXone 1 g Intravenous Q24H   fentaNYL 1 patch Transdermal Q72H   And      [START ON 4/8/2020] Check Fentanyl Patch Placement 1 each Does not apply Q12H   guaiFENesin 600 mg Oral Q12H   HYDROcodone-acetaminophen 1 tablet Oral Q6H   ipratropium-albuterol 3 mL Nebulization Q4H While Awake - RT   lactobacillus acidophilus 1 capsule Oral Daily   metoprolol tartrate 25 mg Oral BID   predniSONE 40 mg Oral Daily   tiotropium bromide monohydrate 2 puff Inhalation Daily - RT      Diet Regular    Assessment/Plan      Active Hospital Problems    Diagnosis  POA   • **Shortness of breath [R06.02]  Yes   • Acute bronchitis [J20.9]  Unknown   • Long term current use of anticoagulant therapy [Z79.01]  Not Applicable   • Non-small cell cancer of left lung (CMS/HCC) [C34.92]  Yes   • Bone metastasis (CMS/HCC) [C79.51]  Yes   • Paroxysmal atrial fibrillation (CMS/HCC) [I48.0]  Yes   • Small cell carcinoma of right lung (CMS/HCC) [C34.91]  Yes   • 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       · SOA: Agree that cancer with possible copd exacerbation are etiologies of her symptoms. Still with rhonchi and some wheezes and seems to be unchanged but per patient is better. Was able to be weaned to room air. Continue steroid and abx. Azithromycin stopped due to loose stool, will start probiotic. Pulmonology following.  will get a walking ox prior to discharge.   · COVID 19 testing NEGATIVE  · Metastatic NSCLC: Oncology following and adjusting pain meds.  · PAF: On eliquis, metoprolol.  · MELANIA: bumex resumed yesterday. renal function and BP have remained stable.   · Disposition: TBD- hopefully home tomorrow if she does okay with new pain regimen. she will need to be sent home on abx through 4/9/2020 and on a steroid taper that should be decreased by 10mg every four days.    YEFRI Anglin  [facility]  04/07/20  13:21    Dictated portions using Dragon dictation software.

## 2020-04-07 NOTE — PROGRESS NOTES
[facility]      Mar/chart reviewed  Follow up lung cancer, wheezing, shortness of breath  She says she feels improved today, still some cough and wheezing  She is comlaining of back and spine pain        Vital Sign Min/Max for last 24 hours  Temp  Min: 97.5 °F (36.4 °C)  Max: 97.9 °F (36.6 °C)   BP  Min: 109/66  Max: 152/69   Pulse  Min: 69  Max: 97   Resp  Min: 18  Max: 20   SpO2  Min: 91 %  Max: 95 %   Flow (L/min)  Min: 1  Max: 1   No data recorded     Appears ill, axox3,   perrl, eomi, no icterus, normal conjunctiva  mmm, no jvd, trachea midline, neck supple, no palpable thyroid nodules  chest fair ae, rhonchi bilaterally, no crackles, some wheezes but does not appear to be lower airway,   rrr,   soft, nt, nd +bs,  no c/c/ e  Skin warm, dry no rashes     Labs: 4/7: reviewed:  Glucose 138  Bun 19  Cr 0.91  Na 135  Bicarb 18  Wbc 15  hgb 8.8  plts 291    A/P:  1. Shortness of breath -- she seems to think it is much improved. I agree with trying further radiation first if possible. I would reserve endobronchial laser or stenting if her symptoms continue to be bothersome, would have to be setup as outpatient likely  2. Stage IV NSCLC -- onc exploring further options  3. COPD -- continue steroids -- wean slowly. Add flutter valve, continue bronchodilators.  4. Anemia    I note palliative care is being consider.  Could continue steroids if they provide palliation. Otherwise I would try a very slow taper, decrease by 10mg every 4 days.

## 2020-04-08 NOTE — PLAN OF CARE
Problem: Patient Care Overview  Goal: Plan of Care Review  Outcome: Ongoing (interventions implemented as appropriate)  Goal: Individualization and Mutuality  Outcome: Ongoing (interventions implemented as appropriate)  Goal: Discharge Needs Assessment  Outcome: Ongoing (interventions implemented as appropriate)  Goal: Interprofessional Rounds/Family Conf  Outcome: Ongoing (interventions implemented as appropriate)   Pt is going home with Hospice care.  Will call her  to update on where we are at in the process.  Discharge teaching done.

## 2020-04-08 NOTE — PROGRESS NOTES
[facility] INPATIENT PROGRESS NOTE    Length of Stay:  5 days    CHIEF COMPLAINT/REASON FOR VISIT:  Metastatic non-small cell lung cancer    SUBJECTIVE: Afebrile.  States pain comes and goes.  Tolerating Fentanyl patch well.      ROS:  Positive for -shortness of breath, improving.  Pain across her mid back.  Negative for -fevers, bleeding    OBJECTIVE:  Vitals:    04/07/20 2300 04/08/20 0657 04/08/20 0700 04/08/20 0705   BP: 112/76  116/89    BP Location: Right arm  Right arm    Patient Position: Lying  Lying    Pulse: 89 88 107    Resp: 18 20 20 20   Temp: 98.3 °F (36.8 °C)  97.6 °F (36.4 °C)    TempSrc: Oral  Oral    SpO2: 96% 95% 95%    Weight:       Height:             PHYSICAL EXAMINATION:  General: Awake and alert.  Getting her nebulizer.  NAD.      DIAGNOSTIC DATA:  Results Review:     I reviewed the patient's new clinical results.    Results from last 7 days   Lab Units 04/08/20  0321   WBC 10*3/mm3 13.84*   HEMOGLOBIN g/dL 8.8*   HEMATOCRIT % 27.0*   PLATELETS 10*3/mm3 340     Lab Results   Component Value Date    NEUTROABS 11.70 (H) 04/08/2020     Results from last 7 days   Lab Units 04/08/20  0321   SODIUM mmol/L 132*   POTASSIUM mmol/L 4.2   CHLORIDE mmol/L 97*   CO2 mmol/L 24.0   BUN mg/dL 20   CREATININE mg/dL 1.03*   GLUCOSE mg/dL 121*   CALCIUM mg/dL 9.3                 IMAGING:    PET scan April 2, 2020 with evidence for progression with an increase in the left upper lobe pulmonary mass.    Assessment/Plan   ASSESSMENT/PLAN:  This is a 74 y.o. female with:     1. History of limited stage small cell lung cancer:   · The patient completed concurrent chemoradiation with 6 cycles of cisplatin and -16 in March 2011 and achieved a complete response.   · She did receive PCI completing this in May 2011.    · The patient showed no evidence of recurrent disease.  2. Metastatic non-small cell lung cancer:  · Annual low-dose screening CT scan of the chest.  Follow-up study from 6/6/18 showed new  findings with small to moderate right pleural effusion as well as right pleural thickening and potential lymphadenopathy along the right lower lobe bronchovascular bundle although difficult to identify with certainty.    · Suspicion for potential underlying malignancy. PET scan 6/15/18 showed no significant change in the right pleural effusion with low level activity in the right lower lobe bronchovascular bundle SUV of only 3.7.  There was therefore no definitive suspicious hypermetabolic activity to explain the pleural effusion.    · Diagnostic right thoracentesis on 6/20/18 with 250 cc clear yellow fluid removed with LDH 90, pH 7.5, total protein 1.8, appearing transudative with pathology showing no evidence of malignant cells. Etiology of the findings and pleural effusion unclear.  Patient referred back to cardiology and was started by PCP on Lasix 40 mg daily in July 2018.    · CT chest 7/13/18 with no change and negative/low probability V/Q scan 7/16/18.  · Repeat CT 8/17/18 showed near complete resolution of pleural effusions with only a minimal right pleural effusion remaining and some atelectasis/scarring in the right lower lobe.   · Follow-up CT chest 11/30/2018 with probable scar right lower lobe.  · Patient was hospitalized 5/22-5/27/2019 with suspected viral and bacterial pneumonia.  She did have evidence of metapneumovirus on respiratory viral panel.  CT chest 5/23/2019 showed increased consolidation right base consistent with infiltrate.  There was evidence of scattered groundglass opacity (likely the viral component).  There was an enlarging nodule however with irregular margins left upper lobe associated with left hilum worrisome for malignancy.   · PET scan 7/11/2019 showed enlarging hypermetabolic left upper lobe/perihilar mass 2.8 cm (SUV 19) with additional 6 mm subpleural left upper lobe lesion (SUV 6.2), right medial 10th rib/costal vertebral activity (SUV 4.5), right iliac 3.5 cm lesion (SUV  11.8), left iliac activity (SUV 5.3).  Activity in distal rectum and collapsed colon of unclear significance.   · CT head (unable to obtain MRI) negative for metastatic disease on 7/24/2019  · Bronchoscopy/EBUS 7/22/2019 unable to reach perihilar mass.  Left upper lobe brushings/washings negative for malignancy.    · CT-guided biopsy left upper lobe mass 8/1/2019- for malignancy.    · CT-guided biopsy right iliac lesion on 8/14/19 with poorly differentiated carcinoma. PDL1 15%, insufficient material for molecular testing.  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  Will need to discuss possible referral to genetics clinic to help determine if REEMA mutation is germline for benefit of other family members.  · Initiated palliative radiation to right iliac lesion 8/15/2019, completed on 8/29/2019  · Patient is not a candidate for chemotherapy due to comorbidities.  Discussed single agent palliative immunotherapy with Keytruda versus palliative/supportive care with hospice involvement.  Patient elected to pursue Keytruda, initiated treatment 8/30/2019.  · Added Xgeva 9/20/2019, plan to treat every 6 weeks to coincide with Keytruda schedule.  · PET scan following 3 cycles Keytruda 10/28/2019 with slight increase left upper lobe mass, slight increase left upper lobe pleural lesion, too small left upper lobe nodules, improvement right iliac lesion (was radiated), new small area of activity right acetabulum (no corresponding CT lesion).  Given length of time between previous PET scan and initiation of Keytruda and possibility of some areas representing pseudo-progression along with lack of alternative therapies elected to continue on Keytruda.  · Following 6 cycles Keytruda, PET scan 12/27/19 showed unequivocal evidence of progressive disease with increase in left hilar mass and increase in left  pleural-based disease.  The pleural-based mass in the left upper lobe increased, abutting T5, bony disease remained stable with sclerotic appearance.  Keytruda discontinued 1/3/2020.  · Patient felt to be poor candidate again for chemotherapy, discussion regarding potential use of PARP inhibitor due to REEMA mutation.  · Approval for olaparib obtained, initiated treatment 2/1/2020 at reduced dose of 150 mg twice daily due to renal insufficiency and compromised performance status.  · PET scan April 2, 2020 with evidence for progression.  · No more systemic treatment options per Dr. Almanzar.  3. Cancer related pain:   · Previous significant pain due to metastatic lesion in the right iliac bone  · Completed palliative radiation right iliac lesion 8/29/2019  · Improvement in right iliac pain following radiation.  · Patient continued as an outpatient with hydrocodone 5/325 as needed, typically requiring approximately 2 doses per day.  · Now on scheduled hydrocodone/acetaminophen 10/325 mg tablets every 6 hours.  She states that these are not helping as much as she would like.  · Started fentanyl patch 25 mcg every 72 hours on 4/7  4. Narcotic induced constipation:  · Currently controlled with Senokot 2 tablets twice daily prn  5. History of stage I breast cancer on the left:   · The patient underwent a left mastectomy and completed 5 years of tamoxifen in July 2003.    · Exam on 6/8/2019 was negative.    · Annual right mammogram 9/5/18 was negative, BI-RADS 1.    · We are not pursuing further routine mammography due to patient's metastatic lung cancer.  6. 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 Stage III chronic kidney disease:   · Baseline creatinine in the 1.1-1.2 range.  8. Atrial fibrillation:   · Pacemaker placement 3/13/17 after having a syncopal episode.  · Patient continues on Eliquis 5 mg twice daily.   9. Mild dementia:   · Short-term memory is very poor.  This has been an ongoing issue for her.  Her  scans do show significant progression of small vessel ischemic change.  It is felt that some of this has been precipitated by her previous PCI.  10. Sigmoid colon uptake on PET scan:   · PET scan 6/15/2018 did show activity in the distal sigmoid colon/rectum and the patient was referred back to her gastroenterologist Dr. Colton Watkins.    · He did perform a flexible sigmoidoscopy which was negative on 8/2/18 and likely the activity represents muscular activity in the bowel wall.    · Subsequent PET scan 7/11/2019 showed continued activity in the distal rectum SUV 15.9.  There was also activity throughout the colon, possibly suggestive of inflammation/colitis.    · Patient relatively asymptomatic from GI standpoint.    · Patient was not felt to require any additional endoscopic evaluation at this point.  · PET scan 10/28/2019 and also 12/27/2019 with unchanged activity in the distal rectum.  · PET scan April 2, 2020 with persistent FDG uptake in the distal rectum and anus with some new fat stranding and soft tissue thickening within the mesorectum and presacral soft tissues of unclear significance  11. Recurrent epistaxis:  · Patient is continuing on Eliquis for atrial fibrillation, 5 mg twice daily  · Patient developed recurrent epistaxis unclear whether unilateral or bilateral.  · Patient referred to Dr. Dos Santos in ENT, refused cauterization  · Not currently an issue  12. Immunotherapy induced skin rash:  · Mild grade 1 immunotherapy induced skin rash developed with macular erythematous lesions forearms and upper chest at end of cycle 3 Keytruda  · Prescribed topical hydrocortisone cream 2.5% twice daily on 11/1/2019.    · Skin rash improved.    13. Shortness of breath:   · COVID-19 negative.    · Pulmonary medicine following.    · Perhaps there is tracheal compromise from the tumor.    · No obvious pneumonia or infiltrate.    · She continues Rocephin.  Azithromycin discontinued.    · We do not think that a stent is  appropriate.  · She is on prednisone 40 mg daily per pulmonary medicine     PLAN:   1. No further olaparib.  2. Eliquis 5 mg every 12 hours continues  3. She continues prednisone 40 mg daily per pulmonology with plans to taper noted  4. She continues hydrocodone/acetaminophen 1 tablet every 6 hours for pain.  This is scheduled.  5. Added fentanyl patch 12 mcg every 72 hours for her pain on 4/7.  Tolerating well.    6. No further radiation or systemic therapy for her lung cancer.  7. Discharging today with home hospice.    Discussed with Dr. Martinez.             Alonso Jean MD

## 2020-04-08 NOTE — PROGRESS NOTES
[facility]      Mar/chart reviewed  Follow up lung cancer, wheezing, shortness of breath  She says she feels improved today, still some cough and wheezing  Ambulated yesterday needed oxygen at that time    Vital Sign Min/Max for last 24 hours  Temp  Min: 97.6 °F (36.4 °C)  Max: 98.3 °F (36.8 °C)   BP  Min: 112/76  Max: 141/77   Pulse  Min: 84  Max: 113   Resp  Min: 16  Max: 22   SpO2  Min: 90 %  Max: 100 %   No data recorded   No data recorded     Appears ill, axox3,   perrl, eomi, no icterus, normal conjunctiva  mmm, no jvd, trachea midline, neck supple, no palpable thyroid nodules  chest fair ae, rhonchi bilaterally, no crackles, some wheezes but does not appear to be lower airway,   rrr,   soft, nt, nd +bs,  no c/c/ e  Skin warm, dry no rashes    A/P:  1. Shortness of breath -- she seems to think it is much improved. I agree with trying further radiation first if possible. I would reserve endobronchial laser or stenting if her symptoms continue to be bothersome, would have to be setup as outpatient likely  2. Stage IV NSCLC -- onc exploring further options  3. COPD -- continue steroids -- wean slowly. Add flutter valve, continue bronchodilators.  4. Anemia     I note palliative care is being consider.  Could continue steroids if they provide palliation. Otherwise I would try a very slow taper, decrease by 10mg every 4 days.

## 2020-04-08 NOTE — DISCHARGE SUMMARY
Patient Name: Hui Workman  : 1945  MRN: [id number]    Date of Admission: 4/3/2020  Date of Discharge:  2020  Primary Care Physician: Cornelio Almanzar Jr., MD      Chief Complaint:   Shortness of Breath      Discharge Diagnoses     Active Hospital Problems    Diagnosis  POA   • **Shortness of breath [R06.02]  Yes   • COPD with acute exacerbation (CMS/HCC) [J44.1]  Yes   • Leukocytosis due to steroids [D72.829]  No   • Acute bronchitis [J20.9]  Yes   • Long term current use of anticoagulant therapy [Z79.01]  Not Applicable   • Non-small cell cancer of left lung (CMS/HCC) [C34.92]  Yes   • Bone metastasis (CMS/HCC) [C79.51]  Yes   • Paroxysmal atrial fibrillation (CMS/HCC) [I48.0]  Yes   • Small cell carcinoma of right lung (CMS/HCC) [C34.91]  Yes   • 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        Hospital Course     Ms. Workman is a 74 y.o. female with a history of metastatic lung cancer (on palliative chemo), COPD, dementia and atrial fibrillation (Eliquis) who presented to [facility] initially complaining of shortness of breath and cough.  Please see the admitting history and physical for further details.  She was admitted to the hospital for further treatment and evaluation. Pulmonology and oncology saw in consultation. There was some suspicion for pneumonia and she was placed on azithromycin, ceftriaxone, steroids, OPEP and bronchodilators. She developed loose stool on the azithromycin so that was discontinued. She completed 6 days of ceftriaxone. She was tested for COVID-19 and results came back negative. She was hypoxic on admission and required continuous oxygen but was able to wean to room air while at rest. However, her oxygen saturations continued to drop on exertion and she will be sent home on exertional oxygen at 2-3L NC. Her breath sounds through admission remained with rhonchi and  wheezes thought to be secondary to her lung tumor. She is no longer a candidate for systemic treatment and unfortunately not even radiation is an option. Oncology consulted hospice and she is in agreement to go home with them today in stable condition. She will be sent home with pain medication and palliative steroids.       Day of Discharge     no new complaints or events overnight and she is more than ready to go home.     denies chest pain, palpitations, SOA, edema, fever, chills, nausea and vomiting.    Physical Exam:  Temp:  [97.6 °F (36.4 °C)-98.3 °F (36.8 °C)] 98.3 °F (36.8 °C)  Heart Rate:  [] 88  Resp:  [16-20] 20  BP: (112-127)/(68-89) 119/68  Body mass index is 28.49 kg/m².  Physical Exam   Constitutional: She is oriented to person, place, and time. She appears ill. No distress.   frail   HENT:   Head: Normocephalic and atraumatic.   Eyes: Conjunctivae and EOM are normal.   Neck: Normal range of motion. Neck supple.   Cardiovascular: Normal rate and regular rhythm.   Pulmonary/Chest: Effort normal. She has wheezes. She has rhonchi.   Abdominal: Soft. Bowel sounds are normal. She exhibits no distension. There is no tenderness.   Musculoskeletal: Normal range of motion. She exhibits no edema.   Neurological: She is alert and oriented to person, place, and time.   Skin: Skin is warm and dry.   Psychiatric: She has a normal mood and affect. Her behavior is normal.   Nursing note and vitals reviewed.      Consultants     Consult Orders (all) (From admission, onward)     Start     Ordered    04/07/20 1208  Inpatient Hospice / [facility] Consult  Once     Specialty:  Hospice and Palliative Medicine  Provider:  (Not yet assigned)    04/07/20 1207    04/06/20 0000  Inpatient Radiation Oncology Consult  Once,   Status:  Canceled     Specialty:  Radiation Oncology  Provider:  Mónica Gilbert MD    04/05/20 1216    04/03/20 2156  Hematology & Oncology Inpatient Consult  Once,   Status:  Canceled     Specialty:   Hematology and Oncology  Provider:  Cornelio Almanzar Jr., MD    04/03/20 2155 04/03/20 2146  Inpatient Pulmonology Consult  Once,   Status:  Canceled     Specialty:  Pulmonary Disease  Provider:  (Not yet assigned)    04/03/20 2151              Procedures     Imaging Results (All)     Procedure Component Value Units Date/Time    XR Chest AP [[id number]] Collected:  04/03/20 1455     Updated:  04/03/20 1531    Narrative:       ONE-VIEW PORTABLE CHEST AT 2:09 PM     HISTORY: Left-sided lung cancer. Cough and fever.     FINDINGS:  There are tumor masses in the left upper lobe medially as  best seen on yesterday's PET fusion CT scan. There is also some  irregular pleural thickening at the left base extending laterally  related to metastatic pleural involvement. There is some chronic  parenchymal and pleural scarring at the right base medially unchanged  from the chest x-ray dated 10/04/2019. I cannot completely exclude a  component of superimposed pneumonia in any of these regions.     The heart remains mildly enlarged with a pacemaker in place.     This report was finalized on 4/3/2020 3:28 PM by Dr. Guilherme Wong M.D.             Pertinent Labs     Results from last 7 days   Lab Units 04/08/20  0321 04/07/20  0435 04/06/20  0436 04/05/20  0849   WBC 10*3/mm3 13.84* 15.08* 12.93* 15.52*   HEMOGLOBIN g/dL 8.8* 8.8* 8.4* 9.5*   PLATELETS 10*3/mm3 340 291 333 370     Results from last 7 days   Lab Units 04/08/20  0321 04/07/20  0435 04/06/20  0436 04/05/20  0849   SODIUM mmol/L 132* 135* 133* 132*   POTASSIUM mmol/L 4.2 4.9 4.6 4.0   CHLORIDE mmol/L 97* 101 101 98   CO2 mmol/L 24.0 18.7* 18.7* 16.7*   BUN mg/dL 20 18 18 17   CREATININE mg/dL 1.03* 0.91 0.99 1.08*   GLUCOSE mg/dL 121* 138* 117* 148*   Estimated Creatinine Clearance: 49.6 mL/min (A) (by C-G formula based on SCr of 1.03 mg/dL (H)).  Results from last 7 days   Lab Units 04/03/20  1418   ALBUMIN g/dL 3.70   BILIRUBIN mg/dL 0.3   ALK PHOS U/L 76   AST (SGOT)  U/L 6   ALT (SGPT) U/L 6     Results from last 7 days   Lab Units 04/08/20  0321 04/07/20  0435 04/06/20  0436 04/05/20  0849  04/03/20  1418   CALCIUM mg/dL 9.3 9.0 8.4* 8.4*   < > 8.5*   ALBUMIN g/dL  --   --   --   --   --  3.70    < > = values in this interval not displayed.       Results from last 7 days   Lab Units 04/03/20  1418   TROPONIN T ng/mL 0.013           Invalid input(s): LDLCALC  Results from last 7 days   Lab Units 04/03/20  1445 04/03/20  1418   BLOODCX  No growth at 4 days No growth at 4 days       Test Results Pending at Discharge      Order Current Status    Blood Culture - Blood, Arm, Right Preliminary result    Blood Culture - Blood, Hand, Right Preliminary result          Discharge Details        Discharge Medications      New Medications      Instructions Start Date   ALPRAZolam 0.25 MG tablet  Commonly known as:  Xanax   0.25 mg, Oral, 2 Times Daily PRN      fentaNYL 12 MCG/HR  Commonly known as:  DURAGESIC   1 patch, Transdermal, Every 72 Hours   Start Date:  April 10, 2020     predniSONE 20 MG tablet  Commonly known as:  DELTASONE   40 mg, Oral, Daily   Start Date:  April 9, 2020        Changes to Medications      Instructions Start Date   HYDROcodone-acetaminophen  MG per tablet  Commonly known as:  NORCO  What changed:    · when to take this  · reasons to take this  · Another medication with the same name was removed. Continue taking this medication, and follow the directions you see here.   1 tablet, Oral, Every 6 Hours      polyethylene glycol packet  Commonly known as:  MIRALAX  What changed:  when to take this   17 g, Oral, 2 Times Daily         Continue These Medications      Instructions Start Date   apixaban 5 MG tablet tablet  Commonly known as:  ELIQUIS   5 mg, Oral, Every 12 Hours Scheduled      bumetanide 2 MG tablet  Commonly known as:  BUMEX   2 mg, Oral, Daily      hydrocortisone 2.5 % cream   Topical, 2 Times Daily      ipratropium-albuterol 0.5-2.5 mg/3 ml  nebulizer  Commonly known as:  DUO-NEB   VVN Q 4 H PRN      Lynparza 150 MG tablet chemo tablet  Generic drug:  Olaparib   150 mg, Oral, 2 Times Daily      metoprolol tartrate 25 MG tablet  Commonly known as:  LOPRESSOR   25 mg, Oral, 2 Times Daily      omeprazole 40 MG capsule  Commonly known as:  priLOSEC   40 mg, Oral, Daily      potassium chloride 20 MEQ CR tablet  Commonly known as:  K-DUR,KLOR-CON   20 mEq, Oral, Daily With Dinner      senna 8.6 MG tablet  Commonly known as:  SENOKOT   2 tablets, Oral, 2 Times Daily         Stop These Medications    azithromycin 250 MG tablet  Commonly known as:  ZITHROMAX            Allergies   Allergen Reactions   • Moxifloxacin Nausea Only     Passed out after taking         Discharge Disposition:  Hospice/Medical Facility (DC - External)    Discharge Diet:  Diet Order   Procedures   • Diet Regular       Discharge Activity:   Activity Instructions     Activity as Tolerated            CODE STATUS:    Code Status and Medical Interventions:   Ordered at: 04/03/20 1601     Level Of Support Discussed With:    Patient     Code Status:    CPR     Medical Interventions (Level of Support Prior to Arrest):    Full       Future Appointments   Date Time Provider Department Center   4/14/2020 11:00 AM GENETIC CLINIC  [facility] [place] [facility]   5/22/2020  1:00 PM PACEART IN OFFICE, [facility] None   5/22/2020  1:40 PM Maribel Louie APRN [facility] None     Additional Instructions for the Follow-ups that You Need to Schedule     Discharge Follow-up with PCP   As directed       Currently Documented PCP:    Cornelio Almanzar Jr., MD    PCP Phone Number:    [phone]     Follow Up Details:  as needed           Follow-up Information     Cornelio Almanzar Jr., MD .    Specialties:  Hematology and Oncology, Oncology, Hematology  Why:  as needed  Contact information:  [address] [address] [phone]                   Additional Instructions for the Follow-ups that  You Need to Schedule     Discharge Follow-up with PCP   As directed       Currently Documented PCP:    Cornelio Almanzar Jr., MD    PCP Phone Number:    [phone]     Follow Up Details:  as needed           Time Spent on Discharge:  Greater than 30 minutes      YEFRI Anglin  [facility]  04/08/20  11:27 AM

## 2020-04-08 NOTE — TELEPHONE ENCOUNTER
RADHA WITH HOSPICE CALLING TO FOUND OUT IF DR. HARVEY WOULD BE THE ATTENDING FOR PT.   PT. IS BEING DISCHARGED TODAY FROM [facility] [place] AND GOING HOME WITH HOSPICE SERVICES.  ALL D/W DR. HARVEY, HE S/W PT. BROTHER LAST NIGHT AND HE WILL BE THE ATTENDING.  UNDERSTANDING NOTED.

## 2020-04-08 NOTE — PLAN OF CARE
Problem: Fall Risk (Adult)  Goal: Absence of Fall  Outcome: Ongoing (interventions implemented as appropriate)  Flowsheets (Taken 4/6/2020 1714 by Maria Isabel Valente RN)  Absence of Fall: making progress toward outcome     Problem: Patient Care Overview  Goal: Plan of Care Review  Outcome: Ongoing (interventions implemented as appropriate)  Flowsheets  Taken 4/8/2020 0251  Progress: improving  Outcome Summary: VSS.  Pain controlled with scheduled lortab and fentanyl patch. RA when resting, but requires oxygen with activity.  Plan for [facility] to see in am and possibly dc home with .  Will continue to monitor.  Taken 4/8/2020 0145  Plan of Care Reviewed With: patient  Goal: Individualization and Mutuality  Outcome: Ongoing (interventions implemented as appropriate)  Goal: Discharge Needs Assessment  Outcome: Ongoing (interventions implemented as appropriate)  Goal: Interprofessional Rounds/Family Conf  Outcome: Ongoing (interventions implemented as appropriate)

## 2020-04-08 NOTE — CONSULTS
Met with patient at bedside. She answered all questions appropriately and is decisional at this time. Explanation of services provided with focus on home [facility] care. Answered all questions, discussed medications, DME needs, code status, and goals of care. [facility] services elected. All consent forms completed and signed via patient. Oxygen ordered for delivery. In regards to code status, patient tells me she does not want to be placed on a ventilator. Lengthy discussion regarding code status and patient desires to be DNR at this time. Patient also requesting Dr Cornelio Almanzar remain attending for [facility] care. Call placed to Dr Almanzar office (spoke with nurse Nikia) and Dr Almanzar is agreeable to remain attending for [facility].    Call placed to patient's brother (Lanodn) to discuss plan, per patient request. Updated patient's nurse (Maira) and CCP (Yoly).    Please call with any questions, concerns, or change in patient status. Thank you for allowing us the opportunity to participate in the care of this patient/family.    Pinky Mosley, RN  Admission Nurse  [facility]  [phone]

## 2020-04-09 NOTE — PROGRESS NOTES
Case Management Discharge Note      Final Note: Home with Hospice. Hospice arranged oxygen.    Provided Post Acute Provider List?: N/A  N/A Provider List Comment: No needs identified at this time.    Destination      No service has been selected for the patient.      Durable Medical Equipment      No service has been selected for the patient.      Dialysis/Infusion      No service has been selected for the patient.      Home Medical Care      No service has been selected for the patient.      Therapy      No service has been selected for the patient.      Community Resources      No service has been selected for the patient.        Transportation Services  Private: Car    Final Discharge Disposition Code: 50 - home with hospice

## 2020-04-14 NOTE — TELEPHONE ENCOUNTER
I called Trevon Ji to update him on the canceled genetic consult for today. Left a message with our call back information if he has any further questions.

## 2020-04-24 ENCOUNTER — TELEPHONE (OUTPATIENT)
Dept: ONCOLOGY | Facility: CLINIC | Age: 75
End: 2020-04-24

## 2020-04-24 NOTE — TELEPHONE ENCOUNTER
[facility] nurse calling to let Dr. Almanzar know that this patient passed away at about 4:00 AM this morning.

## 2020-04-24 NOTE — TELEPHONE ENCOUNTER
Message forwarded to Dr. Yohan Lewis, [facility] nurse calling to let Dr. Almanzar know that this patient passed away at about 4:00 AM this morning.
